# Patient Record
Sex: FEMALE | Race: WHITE | URBAN - METROPOLITAN AREA
[De-identification: names, ages, dates, MRNs, and addresses within clinical notes are randomized per-mention and may not be internally consistent; named-entity substitution may affect disease eponyms.]

---

## 2017-01-14 ENCOUNTER — OUTPATIENT (OUTPATIENT)
Dept: OUTPATIENT SERVICES | Facility: HOSPITAL | Age: 52
LOS: 1 days | Discharge: HOME | End: 2017-01-14

## 2017-06-27 DIAGNOSIS — R91.1 SOLITARY PULMONARY NODULE: ICD-10-CM

## 2017-09-25 ENCOUNTER — OUTPATIENT (OUTPATIENT)
Dept: OUTPATIENT SERVICES | Facility: HOSPITAL | Age: 52
LOS: 1 days | Discharge: HOME | End: 2017-09-25

## 2017-09-25 DIAGNOSIS — M17.0 BILATERAL PRIMARY OSTEOARTHRITIS OF KNEE: ICD-10-CM

## 2018-01-02 ENCOUNTER — OUTPATIENT (OUTPATIENT)
Dept: OUTPATIENT SERVICES | Facility: HOSPITAL | Age: 53
LOS: 1 days | Discharge: HOME | End: 2018-01-02

## 2018-01-02 DIAGNOSIS — Z12.31 ENCOUNTER FOR SCREENING MAMMOGRAM FOR MALIGNANT NEOPLASM OF BREAST: ICD-10-CM

## 2019-01-04 ENCOUNTER — OUTPATIENT (OUTPATIENT)
Dept: OUTPATIENT SERVICES | Facility: HOSPITAL | Age: 54
LOS: 1 days | Discharge: HOME | End: 2019-01-04

## 2019-01-04 DIAGNOSIS — Z12.31 ENCOUNTER FOR SCREENING MAMMOGRAM FOR MALIGNANT NEOPLASM OF BREAST: ICD-10-CM

## 2019-01-11 ENCOUNTER — OUTPATIENT (OUTPATIENT)
Dept: OUTPATIENT SERVICES | Facility: HOSPITAL | Age: 54
LOS: 1 days | Discharge: HOME | End: 2019-01-11

## 2019-01-11 DIAGNOSIS — R92.8 OTHER ABNORMAL AND INCONCLUSIVE FINDINGS ON DIAGNOSTIC IMAGING OF BREAST: ICD-10-CM

## 2019-02-26 ENCOUNTER — OUTPATIENT (OUTPATIENT)
Dept: OUTPATIENT SERVICES | Facility: HOSPITAL | Age: 54
LOS: 1 days | Discharge: HOME | End: 2019-02-26

## 2019-02-26 DIAGNOSIS — E04.1 NONTOXIC SINGLE THYROID NODULE: ICD-10-CM

## 2019-09-17 ENCOUNTER — OUTPATIENT (OUTPATIENT)
Dept: OUTPATIENT SERVICES | Facility: HOSPITAL | Age: 54
LOS: 1 days | Discharge: HOME | End: 2019-09-17

## 2019-09-17 DIAGNOSIS — R06.00 DYSPNEA, UNSPECIFIED: ICD-10-CM

## 2019-09-25 ENCOUNTER — OUTPATIENT (OUTPATIENT)
Dept: OUTPATIENT SERVICES | Facility: HOSPITAL | Age: 54
LOS: 1 days | Discharge: HOME | End: 2019-09-25
Payer: COMMERCIAL

## 2019-09-25 DIAGNOSIS — R06.89 OTHER ABNORMALITIES OF BREATHING: ICD-10-CM

## 2019-09-25 PROCEDURE — 71250 CT THORAX DX C-: CPT | Mod: 26

## 2020-01-13 ENCOUNTER — OUTPATIENT (OUTPATIENT)
Dept: OUTPATIENT SERVICES | Facility: HOSPITAL | Age: 55
LOS: 1 days | Discharge: HOME | End: 2020-01-13
Payer: COMMERCIAL

## 2020-01-13 DIAGNOSIS — Z12.31 ENCOUNTER FOR SCREENING MAMMOGRAM FOR MALIGNANT NEOPLASM OF BREAST: ICD-10-CM

## 2020-01-13 PROBLEM — Z00.00 ENCOUNTER FOR PREVENTIVE HEALTH EXAMINATION: Status: ACTIVE | Noted: 2020-01-13

## 2020-01-13 PROCEDURE — 77063 BREAST TOMOSYNTHESIS BI: CPT | Mod: 26

## 2020-01-13 PROCEDURE — 77067 SCR MAMMO BI INCL CAD: CPT | Mod: 26

## 2020-01-29 ENCOUNTER — OUTPATIENT (OUTPATIENT)
Dept: OUTPATIENT SERVICES | Facility: HOSPITAL | Age: 55
LOS: 1 days | Discharge: HOME | End: 2020-01-29
Payer: COMMERCIAL

## 2020-01-29 DIAGNOSIS — R92.8 OTHER ABNORMAL AND INCONCLUSIVE FINDINGS ON DIAGNOSTIC IMAGING OF BREAST: ICD-10-CM

## 2020-01-29 PROCEDURE — G0279: CPT | Mod: 26,RT

## 2020-01-29 PROCEDURE — 77065 DX MAMMO INCL CAD UNI: CPT | Mod: 26,RT

## 2020-01-29 PROCEDURE — 76642 ULTRASOUND BREAST LIMITED: CPT | Mod: 26,RT

## 2020-01-29 PROCEDURE — 77061 BREAST TOMOSYNTHESIS UNI: CPT | Mod: 26,RT

## 2020-01-30 ENCOUNTER — CHART COPY (OUTPATIENT)
Age: 55
End: 2020-01-30

## 2020-03-04 ENCOUNTER — APPOINTMENT (OUTPATIENT)
Dept: OBGYN | Facility: CLINIC | Age: 55
End: 2020-03-04
Payer: COMMERCIAL

## 2020-03-04 VITALS
BODY MASS INDEX: 36.19 KG/M2 | DIASTOLIC BLOOD PRESSURE: 81 MMHG | HEART RATE: 81 BPM | SYSTOLIC BLOOD PRESSURE: 118 MMHG | WEIGHT: 212 LBS | HEIGHT: 64 IN

## 2020-03-04 DIAGNOSIS — Z78.9 OTHER SPECIFIED HEALTH STATUS: ICD-10-CM

## 2020-03-04 DIAGNOSIS — Z80.42 FAMILY HISTORY OF MALIGNANT NEOPLASM OF PROSTATE: ICD-10-CM

## 2020-03-04 DIAGNOSIS — Z83.3 FAMILY HISTORY OF DIABETES MELLITUS: ICD-10-CM

## 2020-03-04 DIAGNOSIS — Z82.49 FAMILY HISTORY OF ISCHEMIC HEART DISEASE AND OTHER DISEASES OF THE CIRCULATORY SYSTEM: ICD-10-CM

## 2020-03-04 DIAGNOSIS — Z85.42 PERSONAL HISTORY OF MALIGNANT NEOPLASM OF OTHER PARTS OF UTERUS: ICD-10-CM

## 2020-03-04 PROCEDURE — 99214 OFFICE O/P EST MOD 30 MIN: CPT

## 2020-03-04 NOTE — CHIEF COMPLAINT
[Follow Up] : follow up GYN visit [FreeTextEntry1] : Patient is here for gynecologic exam , up to date with PE , blood work , S/P DO-BSO   endometrial Ca , mammography R  breast diagnostic in July 2020.

## 2020-03-04 NOTE — HISTORY OF PRESENT ILLNESS
[Menarche Age: ____] : age at menarche was [unfilled] [Menopause Age: ____] : age at menopause was [unfilled] [Sexually Active] : is sexually active [Female ___] : [unfilled] female [NA] : N/A [Contraception] : does not use contraception

## 2020-03-04 NOTE — DISCUSSION/SUMMARY
[FreeTextEntry1] : Patient with history of endometrial cancer 1999.\par Here for follow up. Also with yeast vaginitis.\par \par Sunshine Garcia M.D.\par \par \par

## 2020-03-26 ENCOUNTER — TRANSCRIPTION ENCOUNTER (OUTPATIENT)
Age: 55
End: 2020-03-26

## 2020-04-29 ENCOUNTER — TRANSCRIPTION ENCOUNTER (OUTPATIENT)
Age: 55
End: 2020-04-29

## 2020-05-26 ENCOUNTER — EMERGENCY (EMERGENCY)
Facility: HOSPITAL | Age: 55
LOS: 0 days | Discharge: HOME | End: 2020-05-26
Attending: EMERGENCY MEDICINE | Admitting: EMERGENCY MEDICINE
Payer: COMMERCIAL

## 2020-05-26 VITALS
TEMPERATURE: 97 F | SYSTOLIC BLOOD PRESSURE: 138 MMHG | DIASTOLIC BLOOD PRESSURE: 86 MMHG | OXYGEN SATURATION: 100 % | RESPIRATION RATE: 18 BRPM | HEART RATE: 78 BPM | WEIGHT: 212.08 LBS | HEIGHT: 64 IN

## 2020-05-26 DIAGNOSIS — Z91.041 RADIOGRAPHIC DYE ALLERGY STATUS: ICD-10-CM

## 2020-05-26 DIAGNOSIS — E11.9 TYPE 2 DIABETES MELLITUS WITHOUT COMPLICATIONS: ICD-10-CM

## 2020-05-26 DIAGNOSIS — R10.9 UNSPECIFIED ABDOMINAL PAIN: ICD-10-CM

## 2020-05-26 DIAGNOSIS — N20.0 CALCULUS OF KIDNEY: ICD-10-CM

## 2020-05-26 DIAGNOSIS — Z88.0 ALLERGY STATUS TO PENICILLIN: ICD-10-CM

## 2020-05-26 DIAGNOSIS — I10 ESSENTIAL (PRIMARY) HYPERTENSION: ICD-10-CM

## 2020-05-26 DIAGNOSIS — Z88.2 ALLERGY STATUS TO SULFONAMIDES: ICD-10-CM

## 2020-05-26 DIAGNOSIS — Z90.49 ACQUIRED ABSENCE OF OTHER SPECIFIED PARTS OF DIGESTIVE TRACT: ICD-10-CM

## 2020-05-26 DIAGNOSIS — Z91.013 ALLERGY TO SEAFOOD: ICD-10-CM

## 2020-05-26 LAB
ALBUMIN SERPL ELPH-MCNC: 4.5 G/DL — SIGNIFICANT CHANGE UP (ref 3.5–5.2)
ALP SERPL-CCNC: 73 U/L — SIGNIFICANT CHANGE UP (ref 30–115)
ALT FLD-CCNC: 27 U/L — SIGNIFICANT CHANGE UP (ref 0–41)
ANION GAP SERPL CALC-SCNC: 12 MMOL/L — SIGNIFICANT CHANGE UP (ref 7–14)
APPEARANCE UR: CLEAR — SIGNIFICANT CHANGE UP
AST SERPL-CCNC: 29 U/L — SIGNIFICANT CHANGE UP (ref 0–41)
BACTERIA # UR AUTO: NEGATIVE — SIGNIFICANT CHANGE UP
BASOPHILS # BLD AUTO: 0.07 K/UL — SIGNIFICANT CHANGE UP (ref 0–0.2)
BASOPHILS NFR BLD AUTO: 0.7 % — SIGNIFICANT CHANGE UP (ref 0–1)
BILIRUB SERPL-MCNC: 0.2 MG/DL — SIGNIFICANT CHANGE UP (ref 0.2–1.2)
BILIRUB UR-MCNC: NEGATIVE — SIGNIFICANT CHANGE UP
BUN SERPL-MCNC: 16 MG/DL — SIGNIFICANT CHANGE UP (ref 10–20)
CALCIUM SERPL-MCNC: 9.6 MG/DL — SIGNIFICANT CHANGE UP (ref 8.5–10.1)
CHLORIDE SERPL-SCNC: 102 MMOL/L — SIGNIFICANT CHANGE UP (ref 98–110)
CK SERPL-CCNC: 61 U/L — SIGNIFICANT CHANGE UP (ref 0–225)
CO2 SERPL-SCNC: 29 MMOL/L — SIGNIFICANT CHANGE UP (ref 17–32)
COLOR SPEC: SIGNIFICANT CHANGE UP
CREAT SERPL-MCNC: 0.7 MG/DL — SIGNIFICANT CHANGE UP (ref 0.7–1.5)
DIFF PNL FLD: ABNORMAL
EOSINOPHIL # BLD AUTO: 0.2 K/UL — SIGNIFICANT CHANGE UP (ref 0–0.7)
EOSINOPHIL NFR BLD AUTO: 2 % — SIGNIFICANT CHANGE UP (ref 0–8)
EPI CELLS # UR: 1 /HPF — SIGNIFICANT CHANGE UP (ref 0–5)
GLUCOSE SERPL-MCNC: 162 MG/DL — HIGH (ref 70–99)
GLUCOSE UR QL: NEGATIVE — SIGNIFICANT CHANGE UP
HCT VFR BLD CALC: 42.5 % — SIGNIFICANT CHANGE UP (ref 37–47)
HGB BLD-MCNC: 14 G/DL — SIGNIFICANT CHANGE UP (ref 12–16)
HYALINE CASTS # UR AUTO: 2 /LPF — SIGNIFICANT CHANGE UP (ref 0–7)
IMM GRANULOCYTES NFR BLD AUTO: 0.3 % — SIGNIFICANT CHANGE UP (ref 0.1–0.3)
KETONES UR-MCNC: NEGATIVE — SIGNIFICANT CHANGE UP
LEUKOCYTE ESTERASE UR-ACNC: ABNORMAL
LYMPHOCYTES # BLD AUTO: 2.9 K/UL — SIGNIFICANT CHANGE UP (ref 1.2–3.4)
LYMPHOCYTES # BLD AUTO: 29.7 % — SIGNIFICANT CHANGE UP (ref 20.5–51.1)
MCHC RBC-ENTMCNC: 27.7 PG — SIGNIFICANT CHANGE UP (ref 27–31)
MCHC RBC-ENTMCNC: 32.9 G/DL — SIGNIFICANT CHANGE UP (ref 32–37)
MCV RBC AUTO: 84.2 FL — SIGNIFICANT CHANGE UP (ref 81–99)
MONOCYTES # BLD AUTO: 0.83 K/UL — HIGH (ref 0.1–0.6)
MONOCYTES NFR BLD AUTO: 8.5 % — SIGNIFICANT CHANGE UP (ref 1.7–9.3)
NEUTROPHILS # BLD AUTO: 5.73 K/UL — SIGNIFICANT CHANGE UP (ref 1.4–6.5)
NEUTROPHILS NFR BLD AUTO: 58.8 % — SIGNIFICANT CHANGE UP (ref 42.2–75.2)
NITRITE UR-MCNC: NEGATIVE — SIGNIFICANT CHANGE UP
NRBC # BLD: 0 /100 WBCS — SIGNIFICANT CHANGE UP (ref 0–0)
PH UR: 6 — SIGNIFICANT CHANGE UP (ref 5–8)
PLATELET # BLD AUTO: 245 K/UL — SIGNIFICANT CHANGE UP (ref 130–400)
POTASSIUM SERPL-MCNC: 4.2 MMOL/L — SIGNIFICANT CHANGE UP (ref 3.5–5)
POTASSIUM SERPL-SCNC: 4.2 MMOL/L — SIGNIFICANT CHANGE UP (ref 3.5–5)
PROT SERPL-MCNC: 6.9 G/DL — SIGNIFICANT CHANGE UP (ref 6–8)
PROT UR-MCNC: SIGNIFICANT CHANGE UP
RBC # BLD: 5.05 M/UL — SIGNIFICANT CHANGE UP (ref 4.2–5.4)
RBC # FLD: 13.4 % — SIGNIFICANT CHANGE UP (ref 11.5–14.5)
RBC CASTS # UR COMP ASSIST: 13 /HPF — HIGH (ref 0–4)
SODIUM SERPL-SCNC: 143 MMOL/L — SIGNIFICANT CHANGE UP (ref 135–146)
SP GR SPEC: 1.01 — SIGNIFICANT CHANGE UP (ref 1.01–1.02)
UROBILINOGEN FLD QL: SIGNIFICANT CHANGE UP
WBC # BLD: 9.76 K/UL — SIGNIFICANT CHANGE UP (ref 4.8–10.8)
WBC # FLD AUTO: 9.76 K/UL — SIGNIFICANT CHANGE UP (ref 4.8–10.8)
WBC UR QL: 4 /HPF — SIGNIFICANT CHANGE UP (ref 0–5)

## 2020-05-26 PROCEDURE — 74176 CT ABD & PELVIS W/O CONTRAST: CPT | Mod: 26

## 2020-05-26 PROCEDURE — 99285 EMERGENCY DEPT VISIT HI MDM: CPT

## 2020-05-26 RX ORDER — SODIUM CHLORIDE 9 MG/ML
1000 INJECTION INTRAMUSCULAR; INTRAVENOUS; SUBCUTANEOUS ONCE
Refills: 0 | Status: DISCONTINUED | OUTPATIENT
Start: 2020-05-26 | End: 2020-05-26

## 2020-05-26 RX ORDER — SODIUM CHLORIDE 9 MG/ML
2000 INJECTION, SOLUTION INTRAVENOUS ONCE
Refills: 0 | Status: COMPLETED | OUTPATIENT
Start: 2020-05-26 | End: 2020-05-26

## 2020-05-26 RX ORDER — IOHEXOL 300 MG/ML
30 INJECTION, SOLUTION INTRAVENOUS ONCE
Refills: 0 | Status: DISCONTINUED | OUTPATIENT
Start: 2020-05-26 | End: 2020-05-26

## 2020-05-26 RX ADMIN — SODIUM CHLORIDE 2000 MILLILITER(S): 9 INJECTION, SOLUTION INTRAVENOUS at 03:07

## 2020-05-26 NOTE — ED PROVIDER NOTE - ATTENDING CONTRIBUTION TO CARE
55 yo female with PMH of DM, HTN, gastric sleeve with previous leak (surgery doc is Ceci), TAHBSO, cholecystectomy, appendectomy presents to the ER for 1 week of left lower back, with occasional radiation to the abdomen. Pt has had N/V x 2 today, and soft to watery stools (4 episodes a day) for past 4 days. Pt denies any fever/dysuria/hematuria/cough/URI/rash/chills/blood in the stools/CP/SOB/trauma. Does admit to walking for a [employer] walkathon (30miles spread over 3 days) but states she normally walks 6-7 miles daily. States her urine afterwards was dark like cola. Denies falls or doing any heavy lifting recently. Pt works as a  and had been tested for COVID, was negative. On exam no flank tenderness to palpation, mild tenderness diffusely (right side on my exam, left side on resident exam), with hernia to the right abdomen palpable, non distended, +BS. Will check labs, urine, CT scan (non contrast as pt states she is allergic to oral and IV contrast). Pt with multiple drug allergies, which she only can list when directly asked about specific meds. Reassess. 53 yo female with PMH of DM, HTN, gastric sleeve with previous leak (surgery doc is Ceci), TAHBSO, cholecystectomy, appendectomy presents to the ER for 1 week of left lower back, with occasional radiation to the abdomen. Pt has had N/V x 2 today, and soft to watery stools (4 episodes a day) for past 4 days. Pt denies any fever/dysuria/hematuria/cough/URI/rash/chills/blood in the stools/CP/SOB/trauma. Does admit to walking for a [employer] walkathon (30miles spread over 3 days) but states she normally walks 6-7 miles daily. States her urine afterwards was dark like cola. Denies falls or doing any heavy lifting recently. Pt works as a  and had been tested for COVID, was negative. On exam no flank tenderness to palpation, mild tenderness diffusely (right side on my exam, left side on resident exam), with hernia to the right abdomen palpable, non distended, +BS. Will check labs, urine, CT scan (non contrast as pt states she is allergic to oral and IV contrast). Pt with multiple drug allergies, which she only can list when directly asked about specific meds. Reassess.    ALL: iodine/shellfish (anaphylaxis), benadryl, pcn, sulfa, azithromycin, vicodin, nsaids  PMH as above  Meds: metformin, bystolic, losartan, levemir, novolog  SH: no smoking, +etoh occ  PMD hui Etienne (last upper/lower endoscopies 3 yrs ago--occ polyp, no ulcer)

## 2020-05-26 NOTE — ED PROVIDER NOTE - NSFOLLOWUPINSTRUCTIONS_ED_ALL_ED_FT
Your body tends to develop kidney stones which can obstruct the flow of urine and cause an increase of pressure within the genitourinary system. This can cause severe pain in your back, flank and abdomen. If it very important to stay well hydrated do reduce the incidence of stone obstruction and pain. If you develop severe pain which is not well controlled with over the counter pain medication, or a fever along with the pain please call your Urologist or visit the hospital. Please follow up with your Urologist for continued monitoring and treatment.

## 2020-05-26 NOTE — ED ADULT TRIAGE NOTE - CHIEF COMPLAINT QUOTE
" I have pain in my left side back and goes in my abdomen for a week and I started vomiting tonight."

## 2020-05-26 NOTE — ED PROVIDER NOTE - PHYSICAL EXAMINATION
CONSTITUTIONAL: Well-developed; well-nourished; in no acute distress.   SKIN: warm, dry  HEAD: Normocephalic.  EYES: PERRL, EOMI.  ENT: Airway clear.  NECK: Supple.  LYMPH: No acute cervical adenopathy.  CARD: No murmurs, rubs or gallops. Regular rate and rhythm.   RESP: No wheezing, rales or rhonchi.  ABD: soft, TTP LLQ, no peritoneal signs. No CVA tenderness. L flank TTP  EXT: No clubbing, cyanosis.   NEURO: Alert, oriented.  PSYCH: Cooperative, appropriate.

## 2020-05-26 NOTE — ED PROVIDER NOTE - PATIENT PORTAL LINK FT
You can access the FollowMyHealth Patient Portal offered by [facility] by registering at the following website: [email or web address]. By joining [facility]’s FollowMyHealth portal, you will also be able to view your health information using other applications (apps) compatible with our system.

## 2020-05-26 NOTE — ED PROVIDER NOTE - NS ED ROS FT
Constitutional: (-) fever  Eyes/ENT: (-) runny nose  Cardiovascular: (-) chest pain, (-) syncope  Respiratory: (-) cough, (-) shortness of breath  Gastrointestinal: (-) vomiting, (-) diarrhea, (+) abdominal pain  : (-) dysuria   Musculoskeletal: (+) back pain, (-) joint pain  Integumentary: (-) rash  Neurological: (-)loc  Allergic/Immunologic: (-) pruritus  Endocrine: (-) history of thyroid disease

## 2020-05-26 NOTE — ED PROVIDER NOTE - NSFOLLOWUPCLINICS_GEN_ALL_ED_FT
[facility]  Medicine  [address], [place]   Phone: [phone]  Fax:   Follow Up Time: 1-3 Days

## 2020-05-26 NOTE — ED PROVIDER NOTE - PROGRESS NOTE DETAILS
SC: UPJ stone seen on CT. Patient to be discharged from ED. Any available test results were discussed with patient and/or family. Verbal instructions given, including instructions to return to ED immediately for any new, worsening, or concerning symptoms. Patient endorsed understanding. Written discharge instructions additionally given, including follow-up plan.

## 2020-05-26 NOTE — ED PROVIDER NOTE - OBJECTIVE STATEMENT
54F with pmh of DM, HTN, gastric sleeve presents with L flank pain radiating to the LLQ beginning 1 week ago, sharp, moderate intensity, no provoking or palliating factors. PT states that she went on a 30 mile walk before sx bgan. She also notes NB diarrhea aprox 4x daily for the past 4 days. PT denies fever, chills, n/v/d, dysuria, hematuria. 54F with pmh of DM, HTN, gastric sleeve 5 years ago presents with L flank pain radiating to the LLQ beginning 1 week ago, sharp, moderate intensity, no provoking or palliating factors. PT states that she went on a 30 mile walk before sx bgan. She also notes NB diarrhea aprox 4x daily for the past 4 days. PT denies fever, chills, n/v/d, dysuria, hematuria.  Surg: Jeny

## 2020-05-26 NOTE — ED PROVIDER NOTE - CLINICAL SUMMARY MEDICAL DECISION MAKING FREE TEXT BOX
53 yo female with pmh of dm, htn, gastric sleeve presents to ER for left flank and left sided abdomen discomfort. Multiple allergies. Pt had labs, urine, CT non contrast done. Found to have left distal stone 5x4 mm, mild hydro. UA shows blood and small leuko but neg bacteria/nitr, and denies frequency or dysuria. Recommend tylenol for pain, fluids and follow up with urology.

## 2020-05-26 NOTE — ED ADULT NURSE REASSESSMENT NOTE - NS ED NURSE REASSESS COMMENT FT1
Patient resting in bed upon discharge, no complaints of pain. As per MD echavarria, ok to DC without receiving NS bolus. iv removed and papers given. ambulated out of ed steadily

## 2020-05-27 ENCOUNTER — TRANSCRIPTION ENCOUNTER (OUTPATIENT)
Age: 55
End: 2020-05-27

## 2020-05-27 LAB
CULTURE RESULTS: SIGNIFICANT CHANGE UP
SPECIMEN SOURCE: SIGNIFICANT CHANGE UP

## 2020-07-21 ENCOUNTER — TRANSCRIPTION ENCOUNTER (OUTPATIENT)
Age: 55
End: 2020-07-21

## 2020-07-31 ENCOUNTER — RESULT REVIEW (OUTPATIENT)
Age: 55
End: 2020-07-31

## 2020-07-31 ENCOUNTER — OUTPATIENT (OUTPATIENT)
Dept: OUTPATIENT SERVICES | Facility: HOSPITAL | Age: 55
LOS: 1 days | Discharge: HOME | End: 2020-07-31
Payer: COMMERCIAL

## 2020-07-31 DIAGNOSIS — R92.8 OTHER ABNORMAL AND INCONCLUSIVE FINDINGS ON DIAGNOSTIC IMAGING OF BREAST: ICD-10-CM

## 2020-07-31 PROCEDURE — 77065 DX MAMMO INCL CAD UNI: CPT | Mod: 26,RT

## 2020-07-31 PROCEDURE — G0279: CPT | Mod: 26

## 2020-08-11 ENCOUNTER — OUTPATIENT (OUTPATIENT)
Dept: OUTPATIENT SERVICES | Facility: HOSPITAL | Age: 55
LOS: 1 days | Discharge: HOME | End: 2020-08-11
Payer: COMMERCIAL

## 2020-08-11 ENCOUNTER — RESULT REVIEW (OUTPATIENT)
Age: 55
End: 2020-08-11

## 2020-08-11 DIAGNOSIS — N20.0 CALCULUS OF KIDNEY: ICD-10-CM

## 2020-08-11 PROCEDURE — 76770 US EXAM ABDO BACK WALL COMP: CPT | Mod: 26

## 2020-08-14 ENCOUNTER — APPOINTMENT (OUTPATIENT)
Dept: UROLOGY | Facility: CLINIC | Age: 55
End: 2020-08-14
Payer: COMMERCIAL

## 2020-08-14 VITALS
HEART RATE: 74 BPM | SYSTOLIC BLOOD PRESSURE: 130 MMHG | DIASTOLIC BLOOD PRESSURE: 82 MMHG | WEIGHT: 206 LBS | HEIGHT: 64 IN | BODY MASS INDEX: 35.17 KG/M2 | TEMPERATURE: 98.4 F

## 2020-08-14 DIAGNOSIS — N20.0 CALCULUS OF KIDNEY: ICD-10-CM

## 2020-08-14 PROCEDURE — 99204 OFFICE O/P NEW MOD 45 MIN: CPT

## 2020-08-17 LAB
APPEARANCE: CLEAR
BACTERIA UR CULT: NORMAL
BILIRUBIN URINE: NEGATIVE
BLOOD URINE: NEGATIVE
COLOR: YELLOW
GLUCOSE QUALITATIVE U: ABNORMAL
KETONES URINE: NEGATIVE
LEUKOCYTE ESTERASE URINE: NEGATIVE
NITRITE URINE: NEGATIVE
PH URINE: 5.5
PROTEIN URINE: NORMAL
SPECIFIC GRAVITY URINE: 1.02
UROBILINOGEN URINE: NORMAL

## 2020-08-18 ENCOUNTER — OUTPATIENT (OUTPATIENT)
Dept: OUTPATIENT SERVICES | Facility: HOSPITAL | Age: 55
LOS: 1 days | Discharge: HOME | End: 2020-08-18
Payer: COMMERCIAL

## 2020-08-18 DIAGNOSIS — N20.1 CALCULUS OF URETER: ICD-10-CM

## 2020-08-18 PROCEDURE — 74019 RADEX ABDOMEN 2 VIEWS: CPT | Mod: 26

## 2020-08-20 ENCOUNTER — APPOINTMENT (OUTPATIENT)
Dept: UROLOGY | Facility: CLINIC | Age: 55
End: 2020-08-20
Payer: COMMERCIAL

## 2020-08-20 ENCOUNTER — OUTPATIENT (OUTPATIENT)
Dept: OUTPATIENT SERVICES | Facility: HOSPITAL | Age: 55
LOS: 1 days | Discharge: HOME | End: 2020-08-20
Payer: COMMERCIAL

## 2020-08-20 VITALS
WEIGHT: 206 LBS | SYSTOLIC BLOOD PRESSURE: 153 MMHG | TEMPERATURE: 98 F | BODY MASS INDEX: 35.17 KG/M2 | HEART RATE: 77 BPM | DIASTOLIC BLOOD PRESSURE: 83 MMHG | HEIGHT: 64 IN

## 2020-08-20 VITALS
RESPIRATION RATE: 18 BRPM | OXYGEN SATURATION: 97 % | SYSTOLIC BLOOD PRESSURE: 144 MMHG | TEMPERATURE: 98 F | WEIGHT: 205.03 LBS | HEART RATE: 66 BPM | DIASTOLIC BLOOD PRESSURE: 75 MMHG | HEIGHT: 64 IN

## 2020-08-20 DIAGNOSIS — N20.1 CALCULUS OF URETER: ICD-10-CM

## 2020-08-20 DIAGNOSIS — Z90.49 ACQUIRED ABSENCE OF OTHER SPECIFIED PARTS OF DIGESTIVE TRACT: Chronic | ICD-10-CM

## 2020-08-20 DIAGNOSIS — N13.30 UNSPECIFIED HYDRONEPHROSIS: ICD-10-CM

## 2020-08-20 DIAGNOSIS — Z01.818 ENCOUNTER FOR OTHER PREPROCEDURAL EXAMINATION: ICD-10-CM

## 2020-08-20 DIAGNOSIS — Z98.890 OTHER SPECIFIED POSTPROCEDURAL STATES: Chronic | ICD-10-CM

## 2020-08-20 DIAGNOSIS — Z90.710 ACQUIRED ABSENCE OF BOTH CERVIX AND UTERUS: Chronic | ICD-10-CM

## 2020-08-20 LAB
A1C WITH ESTIMATED AVERAGE GLUCOSE RESULT: 9 % — HIGH (ref 4–5.6)
ALBUMIN SERPL ELPH-MCNC: 4.6 G/DL — SIGNIFICANT CHANGE UP (ref 3.5–5.2)
ALP SERPL-CCNC: 73 U/L — SIGNIFICANT CHANGE UP (ref 30–115)
ALT FLD-CCNC: 19 U/L — SIGNIFICANT CHANGE UP (ref 0–41)
ANION GAP SERPL CALC-SCNC: 13 MMOL/L — SIGNIFICANT CHANGE UP (ref 7–14)
APPEARANCE UR: CLEAR — SIGNIFICANT CHANGE UP
APTT BLD: 27.4 SEC — SIGNIFICANT CHANGE UP (ref 27–39.2)
AST SERPL-CCNC: 23 U/L — SIGNIFICANT CHANGE UP (ref 0–41)
BILIRUB SERPL-MCNC: 0.3 MG/DL — SIGNIFICANT CHANGE UP (ref 0.2–1.2)
BILIRUB UR-MCNC: NEGATIVE — SIGNIFICANT CHANGE UP
BUN SERPL-MCNC: 12 MG/DL — SIGNIFICANT CHANGE UP (ref 10–20)
CALCIUM SERPL-MCNC: 9.4 MG/DL — SIGNIFICANT CHANGE UP (ref 8.5–10.1)
CHLORIDE SERPL-SCNC: 101 MMOL/L — SIGNIFICANT CHANGE UP (ref 98–110)
CO2 SERPL-SCNC: 26 MMOL/L — SIGNIFICANT CHANGE UP (ref 17–32)
COLOR SPEC: YELLOW — SIGNIFICANT CHANGE UP
CREAT SERPL-MCNC: 0.7 MG/DL — SIGNIFICANT CHANGE UP (ref 0.7–1.5)
DIFF PNL FLD: ABNORMAL
ESTIMATED AVERAGE GLUCOSE: 212 MG/DL — HIGH (ref 68–114)
GLUCOSE SERPL-MCNC: 240 MG/DL — HIGH (ref 70–99)
GLUCOSE UR QL: ABNORMAL
HCT VFR BLD CALC: 41.3 % — SIGNIFICANT CHANGE UP (ref 37–47)
HGB BLD-MCNC: 13.7 G/DL — SIGNIFICANT CHANGE UP (ref 12–16)
INR BLD: 0.97 RATIO — SIGNIFICANT CHANGE UP (ref 0.65–1.3)
KETONES UR-MCNC: SIGNIFICANT CHANGE UP
LEUKOCYTE ESTERASE UR-ACNC: NEGATIVE — SIGNIFICANT CHANGE UP
MCHC RBC-ENTMCNC: 28.1 PG — SIGNIFICANT CHANGE UP (ref 27–31)
MCHC RBC-ENTMCNC: 33.2 G/DL — SIGNIFICANT CHANGE UP (ref 32–37)
MCV RBC AUTO: 84.6 FL — SIGNIFICANT CHANGE UP (ref 81–99)
NITRITE UR-MCNC: NEGATIVE — SIGNIFICANT CHANGE UP
NRBC # BLD: 0 /100 WBCS — SIGNIFICANT CHANGE UP (ref 0–0)
PH UR: 5 — SIGNIFICANT CHANGE UP (ref 5–8)
PLATELET # BLD AUTO: 189 K/UL — SIGNIFICANT CHANGE UP (ref 130–400)
POTASSIUM SERPL-MCNC: 4.3 MMOL/L — SIGNIFICANT CHANGE UP (ref 3.5–5)
POTASSIUM SERPL-SCNC: 4.3 MMOL/L — SIGNIFICANT CHANGE UP (ref 3.5–5)
PROT SERPL-MCNC: 6.7 G/DL — SIGNIFICANT CHANGE UP (ref 6–8)
PROT UR-MCNC: NEGATIVE — SIGNIFICANT CHANGE UP
PROTHROM AB SERPL-ACNC: 11.2 SEC — SIGNIFICANT CHANGE UP (ref 9.95–12.87)
RBC # BLD: 4.88 M/UL — SIGNIFICANT CHANGE UP (ref 4.2–5.4)
RBC # FLD: 13.2 % — SIGNIFICANT CHANGE UP (ref 11.5–14.5)
SODIUM SERPL-SCNC: 140 MMOL/L — SIGNIFICANT CHANGE UP (ref 135–146)
SP GR SPEC: 1.02 — SIGNIFICANT CHANGE UP (ref 1.01–1.02)
UROBILINOGEN FLD QL: SIGNIFICANT CHANGE UP
WBC # BLD: 7.39 K/UL — SIGNIFICANT CHANGE UP (ref 4.8–10.8)
WBC # FLD AUTO: 7.39 K/UL — SIGNIFICANT CHANGE UP (ref 4.8–10.8)

## 2020-08-20 PROCEDURE — 99214 OFFICE O/P EST MOD 30 MIN: CPT

## 2020-08-20 PROCEDURE — 93010 ELECTROCARDIOGRAM REPORT: CPT

## 2020-08-20 NOTE — LETTER BODY
[Please see my note below.] : Please see my note below. [Courtesy Letter:] : I had the pleasure of seeing your patient, [unfilled], in my office today. [FreeTextEntry1] : Please note she had a primary care physician who retired she is not yet picked another one she was told she must get one she is 54 years old, morbidly obese with diabetes and hypertension and anesthesia will not put her to sleep for a non-life-threatening urgent procedure without clearance [Sincerely,] : Sincerely,

## 2020-08-20 NOTE — H&P PST ADULT - HISTORY OF PRESENT ILLNESS
Pt denies cp palp uri cough dysuria or sob. ET: 1 FOS- denies SOB . PT denies any open wounds, drainage or rashes. denies hx of covid- denies recent cough fever or infection. aware to self quarantine preop. all instructions reviewed. scheduled for 9/2 procedure cystoscopy left ureteroscopy laser lithotripsy stone basketing and or JJ STENT POSSIBLE  SHOCK WAVE.  OR  BOOKING MADE AWARE OF LATEX ALLERGY FOR DOP 9/2 [facility].  PT HAS BEEN EXPERIENCING PAIN LEFT LOWER BACK  SINCE 5/2020.   DENIES PAIN MEDS . hx DM. FOLLOWS DR NYE. ADVISED F/U FOR 9/1 PM DOSE INSULIN HALF OR HOLD. PT COMPLIANT   Anesthesia Alert  NO--Difficult Airway   NO--History of neck surgery or radiation  NO--Limited ROM of neck  NO--History of Malignant hyperthermia  NO--Personal or family history of Pseudocholinesterase deficiency  NO--Prior Anesthesia Complication  NO--Latex Allergy  NO--Loose teeth  NO--History of Rheumatoid Arthritis  NO--NIDIA  NO--Other_____ Pt denies cp palp uri cough dysuria or sob. ET: 1 FOS- denies SOB . PT denies any open wounds, drainage or rashes. denies hx of covid- denies recent cough fever or infection. aware to self quarantine preop. all instructions reviewed. scheduled for 9/2 procedure cystoscopy left ureteroscopy laser lithotripsy stone basketing and or JJ STENT POSSIBLE  SHOCK WAVE.  OR  BOOKING MADE AWARE OF LATEX ALLERGY FOR DOP 9/2 [facility].  PT HAS BEEN EXPERIENCING DULL CONSTANT  PAIN LEFT LOWER BACK SINCE 5/2020.   DENIES PAIN MEDS . HX DM. FOLLOWS DR NYE. ADVISED F/U FOR 9/1 PM DOSE INSULIN HALF OR HOLD. PT COMPLIANT .  Anesthesia Alert  NO--Difficult Airway   NO--History of neck surgery or radiation  NO--Limited ROM of neck  NO--History of Malignant hyperthermia  NO--Personal or family history of Pseudocholinesterase deficiency  NO--Prior Anesthesia Complication  YES-Latex Allergy - OR BOOKING AWARE    NO--Loose teeth  NO--History of Rheumatoid Arthritis  NO--NIDIA  NO--Other_____

## 2020-08-20 NOTE — HISTORY OF PRESENT ILLNESS
[None] : None [FreeTextEntry1] : Mary is a 54 year-old female, with a history of type 2 diabetes and hypertension, who was sent for consultation regarding left ureteral stone.\par \par On 5/26/20 patient presented to the emergency room complaining of severe left-sided flank pain, radiating across the abdomen, associated with nausea/vomiting.\par \par CT scan demonstrated mild left hydronephrosis secondary to an obstructing UPJ stone, measuring 0.5 x 0.4 x 0.3 cm with average Hounsfield units of 1000.\par \par During course of hospital stay patient's symptoms improved & she was discharged and instructed to followup with urology.\par \par She could not make an appointment until September and wound up having an episode of renal colic sometime in July. She went to see nephrology who ordered a renal ultrasound.\par \par Renal ultrasound Done August 11, 2020,demonstrated mild left hydronephrosis with Now a proximal left ureteral 0.6 x 0.3 x 0.7 cm calculus somewhat but minimally more distal   then when it was seen on prior CT scan. She was instructed to follow up immediately with urology.\par \par Today she is asymptomatic and denies any abdominal/flank pain or nausea/vomiting. She is urinating well without any change in her urination.\par \par She presents today for Urologic consultation and to plan definitive stone management

## 2020-08-20 NOTE — ASSESSMENT
[FreeTextEntry1] : She has an obstructing left ureteral stone,  resulting in mild left hydronephrosis, since May. Hounsfield units are about 550 - 700, as per my measurement.\par She is currently asymptomatic and denies any significant discomfort at this time.\par \par All options were reviewed including ESWL versus lithotripsy. At this time she'll obtain a KUB and medical clearance followed next week to make a decision regarding her procedure, Knowing that the success rate especially for an impacted stone is less with lithotripsy and if she has infection upstream it may be made worse. However if she has Pus upstream from the stone and we try ureteroscopy will probably be in two stages with the stent to decompress and/or stress the system. UA C&S will be sent from the office today.\par \par She will start alfuzosin For alpha blockade as opposed to tamsulosin because she has a sulfa allergy, and We also prescribed tramadol as needed for discomfort\par \par We discussed reasons presented to the ER including worsening pain, nausea/vomiting, fever/chills, hematuria, or further constitutional symptoms.\par \par

## 2020-08-20 NOTE — LETTER HEADER
[FreeTextEntry3] : Hayley Lr M.D.\par Director of Urology\par [facility]\par [address] [place] [address], [address]\[place] [address]

## 2020-08-20 NOTE — PHYSICAL EXAM
[General Appearance - Well Developed] : well developed [General Appearance - Well Nourished] : well nourished [Well Groomed] : well groomed [Normal Appearance] : normal appearance [General Appearance - In No Acute Distress] : no acute distress [Exaggerated Use Of Accessory Muscles For Inspiration] : no accessory muscle use [Respiration, Rhythm And Depth] : normal respiratory rhythm and effort [Abdomen Tenderness] : non-tender [Abdomen Soft] : soft [Costovertebral Angle Tenderness] : no ~M costovertebral angle tenderness [Urinary Bladder Findings] : the bladder was normal on palpation [Normal Station and Gait] : the gait and station were normal for the patient's age [] : no rash [No Focal Deficits] : no focal deficits [Oriented To Time, Place, And Person] : oriented to person, place, and time [Affect] : the affect was normal [Mood] : the mood was normal [Not Anxious] : not anxious [Heart Rate And Rhythm] : Heart rate and rhythm were normal [FreeTextEntry1] : Obese

## 2020-08-20 NOTE — HISTORY OF PRESENT ILLNESS
[FreeTextEntry1] : Mary is a 54 year-old female, with a history of type 2 diabetes and hypertension, who was sent for consultation regarding left ureteral stone.\par \par On 5/26/20 patient presented to the emergency room complaining of severe left-sided flank pain, radiating across the abdomen, associated with nausea/vomiting.\par \par CT scan demonstrated mild left hydronephrosis secondary to an obstructing UPJ stone, measuring 0.5 x 0.4 x 0.3 cm with average Hounsfield units of 1000.\par \par During course of hospital stay patient's symptoms improved & she was discharged and instructed to followup with urology.\par \par She could not make an appointment until September and wound up having an episode of renal colic sometime in July. She went to see nephrology who ordered a renal ultrasound.\par \par Renal ultrasound Done August 11, 2020,demonstrated mild left hydronephrosis with Now a proximal left ureteral 0.6 x 0.3 x 0.7 cm calculus somewhat but minimally more distal   then when it was seen on prior CT scan. She was instructed to follow up immediately with urology.\par \par She was seen on 08/14/2020 at which time she was asymptomatic and denied any abdominal/flank pain or nausea/vomiting. She was urinating well without any change in her urination.\par \par We reviewed the options sent her for a KUB, UA with C&S and she is here to discuss the results and options [1] : 1 [Left Flank] : left flank [None] : There is no radiation [Aching] : aching [___ Month(s) Ago] : [unfilled] month(s) ago [Constant] : constantly [de-identified] : episodic colic [de-identified] : time

## 2020-08-20 NOTE — H&P PST ADULT - ASSESSMENT
npo overnight  allergies reviewed  antibiotic clindamycin  no nsaids (allergy)  pt told re need to ambulate asap post op due to risk of DVT

## 2020-08-20 NOTE — H&P PST ADULT - NSICDXPASTMEDICALHX_GEN_ALL_CORE_FT
PAST MEDICAL HISTORY:  DM (diabetes mellitus)     HTN (hypertension)     Obese     Uterine cancer 1999 PAST MEDICAL HISTORY:  DM (diabetes mellitus)     H/O pneumothorax 2012 complications post  gastric sleeve    HTN (hypertension)     Obese     Pneumonia 2012 post gastric sleeve    Uterine cancer 1999 PAST MEDICAL HISTORY:  DM (diabetes mellitus)     H/O pneumothorax 2012 complications post  gastric sleeve    HTN (hypertension)     Hydronephrosis, left     Obese     Pneumonia 2012 post gastric sleeve    Ureteral stone     Uterine cancer 1999

## 2020-08-20 NOTE — LETTER HEADER
[FreeTextEntry3] : Hayley Lr M.D.\par Director of Urology\par [facility]\par [address] [place] [address], [address]\[place] [address]

## 2020-08-20 NOTE — H&P PST ADULT - NSICDXPASTSURGICALHX_GEN_ALL_CORE_FT
PAST SURGICAL HISTORY:  H/O hernia repair     History of cholecystectomy     S/P gastric surgery SLEEVE 2012    S/P gastric surgery 2000 BAND PROCEDURE & REMOVED    S/P hysterectomy PAST SURGICAL HISTORY:  H/O hernia repair     History of cholecystectomy     S/P gastric surgery SLEEVE 2012 -post op complications. ICU X MANY WKS PER PT    S/P gastric surgery 2000 BAND PROCEDURE & REMOVED    S/P hysterectomy 1999

## 2020-08-20 NOTE — ASSESSMENT
[FreeTextEntry1] : my recommendation is ureteroscopy possibly JJ . if she really doesn’t want we will go for eswl

## 2020-08-20 NOTE — LETTER BODY
[Consult Letter:] : I had the pleasure of evaluating your patient, [unfilled]. [Please see my note below.] : Please see my note below. [Sincerely,] : Sincerely, [Consult Closing:] : Thank you very much for allowing me to participate in the care of this patient.  If you have any questions, please do not hesitate to contact me. [FreeTextEntry2] : To Whom It May Concern [FreeTextEntry1] : Please note she had a primary care physician who retired she is not yet picked another one she was told she must get one she is 54 years old, morbidly obese with diabetes and hypertension and anesthesia will not put her to sleep for a non-life-threatening urgent procedure without clearance

## 2020-08-22 LAB
CULTURE RESULTS: SIGNIFICANT CHANGE UP
SPECIMEN SOURCE: SIGNIFICANT CHANGE UP

## 2020-08-26 ENCOUNTER — OUTPATIENT (OUTPATIENT)
Dept: OUTPATIENT SERVICES | Facility: HOSPITAL | Age: 55
LOS: 1 days | Discharge: HOME | End: 2020-08-26

## 2020-08-26 ENCOUNTER — RESULT REVIEW (OUTPATIENT)
Age: 55
End: 2020-08-26

## 2020-08-26 DIAGNOSIS — Z98.890 OTHER SPECIFIED POSTPROCEDURAL STATES: Chronic | ICD-10-CM

## 2020-08-26 DIAGNOSIS — Z90.49 ACQUIRED ABSENCE OF OTHER SPECIFIED PARTS OF DIGESTIVE TRACT: Chronic | ICD-10-CM

## 2020-08-26 DIAGNOSIS — Z90.710 ACQUIRED ABSENCE OF BOTH CERVIX AND UTERUS: Chronic | ICD-10-CM

## 2020-08-26 DIAGNOSIS — Z02.9 ENCOUNTER FOR ADMINISTRATIVE EXAMINATIONS, UNSPECIFIED: ICD-10-CM

## 2020-08-26 PROBLEM — I10 ESSENTIAL (PRIMARY) HYPERTENSION: Chronic | Status: ACTIVE | Noted: 2020-08-20

## 2020-08-26 PROBLEM — E66.9 OBESITY, UNSPECIFIED: Chronic | Status: ACTIVE | Noted: 2020-08-20

## 2020-08-26 PROBLEM — N20.1 CALCULUS OF URETER: Chronic | Status: ACTIVE | Noted: 2020-08-20

## 2020-08-26 PROBLEM — Z87.09 PERSONAL HISTORY OF OTHER DISEASES OF THE RESPIRATORY SYSTEM: Chronic | Status: ACTIVE | Noted: 2020-08-20

## 2020-08-26 PROBLEM — N13.30 UNSPECIFIED HYDRONEPHROSIS: Chronic | Status: ACTIVE | Noted: 2020-08-20

## 2020-08-26 PROBLEM — C55 MALIGNANT NEOPLASM OF UTERUS, PART UNSPECIFIED: Chronic | Status: ACTIVE | Noted: 2020-08-20

## 2020-08-26 PROBLEM — E11.9 TYPE 2 DIABETES MELLITUS WITHOUT COMPLICATIONS: Chronic | Status: ACTIVE | Noted: 2020-08-20

## 2020-08-26 PROBLEM — J18.9 PNEUMONIA, UNSPECIFIED ORGANISM: Chronic | Status: ACTIVE | Noted: 2020-08-20

## 2020-08-26 LAB
APPEARANCE UR: CLEAR — SIGNIFICANT CHANGE UP
BACTERIA # UR AUTO: NEGATIVE — SIGNIFICANT CHANGE UP
BILIRUB UR-MCNC: NEGATIVE — SIGNIFICANT CHANGE UP
COLOR SPEC: SIGNIFICANT CHANGE UP
DIFF PNL FLD: NEGATIVE — SIGNIFICANT CHANGE UP
EPI CELLS # UR: 2 /HPF — SIGNIFICANT CHANGE UP (ref 0–5)
GLUCOSE UR QL: NEGATIVE — SIGNIFICANT CHANGE UP
HYALINE CASTS # UR AUTO: 1 /LPF — SIGNIFICANT CHANGE UP (ref 0–7)
KETONES UR-MCNC: NEGATIVE — SIGNIFICANT CHANGE UP
LEUKOCYTE ESTERASE UR-ACNC: ABNORMAL
NITRITE UR-MCNC: NEGATIVE — SIGNIFICANT CHANGE UP
PH UR: 6 — SIGNIFICANT CHANGE UP (ref 5–8)
PROT UR-MCNC: NEGATIVE — SIGNIFICANT CHANGE UP
RBC CASTS # UR COMP ASSIST: 5 /HPF — HIGH (ref 0–4)
SP GR SPEC: 1.01 — SIGNIFICANT CHANGE UP (ref 1.01–1.02)
UROBILINOGEN FLD QL: SIGNIFICANT CHANGE UP
WBC UR QL: 8 /HPF — HIGH (ref 0–5)

## 2020-08-26 PROCEDURE — 71046 X-RAY EXAM CHEST 2 VIEWS: CPT | Mod: 26

## 2020-08-27 ENCOUNTER — APPOINTMENT (OUTPATIENT)
Dept: UROLOGY | Facility: CLINIC | Age: 55
End: 2020-08-27
Payer: COMMERCIAL

## 2020-08-27 VITALS
HEART RATE: 79 BPM | BODY MASS INDEX: 35.17 KG/M2 | WEIGHT: 206 LBS | HEIGHT: 64 IN | SYSTOLIC BLOOD PRESSURE: 120 MMHG | TEMPERATURE: 97.6 F | DIASTOLIC BLOOD PRESSURE: 78 MMHG

## 2020-08-27 LAB
CULTURE RESULTS: SIGNIFICANT CHANGE UP
SPECIMEN SOURCE: SIGNIFICANT CHANGE UP

## 2020-08-27 PROCEDURE — 51701 INSERT BLADDER CATHETER: CPT

## 2020-08-30 ENCOUNTER — OUTPATIENT (OUTPATIENT)
Dept: OUTPATIENT SERVICES | Facility: HOSPITAL | Age: 55
LOS: 1 days | Discharge: HOME | End: 2020-08-30

## 2020-08-30 ENCOUNTER — LABORATORY RESULT (OUTPATIENT)
Age: 55
End: 2020-08-30

## 2020-08-30 DIAGNOSIS — Z98.890 OTHER SPECIFIED POSTPROCEDURAL STATES: Chronic | ICD-10-CM

## 2020-08-30 DIAGNOSIS — Z90.710 ACQUIRED ABSENCE OF BOTH CERVIX AND UTERUS: Chronic | ICD-10-CM

## 2020-08-30 DIAGNOSIS — Z11.59 ENCOUNTER FOR SCREENING FOR OTHER VIRAL DISEASES: ICD-10-CM

## 2020-08-30 DIAGNOSIS — Z90.49 ACQUIRED ABSENCE OF OTHER SPECIFIED PARTS OF DIGESTIVE TRACT: Chronic | ICD-10-CM

## 2020-08-31 LAB
APPEARANCE: CLEAR
BILIRUBIN URINE: NEGATIVE
BLOOD URINE: NEGATIVE
COLOR: NORMAL
GLUCOSE QUALITATIVE U: ABNORMAL
KETONES URINE: NEGATIVE
LEUKOCYTE ESTERASE URINE: NEGATIVE
NITRITE URINE: NEGATIVE
PH URINE: 6
PROTEIN URINE: NEGATIVE
SPECIFIC GRAVITY URINE: 1.01
URINE CULTURE <10: NORMAL
UROBILINOGEN URINE: NORMAL

## 2020-09-02 ENCOUNTER — OUTPATIENT (OUTPATIENT)
Dept: OUTPATIENT SERVICES | Facility: HOSPITAL | Age: 55
LOS: 1 days | Discharge: HOME | End: 2020-09-02
Payer: COMMERCIAL

## 2020-09-02 ENCOUNTER — APPOINTMENT (OUTPATIENT)
Dept: UROLOGY | Facility: HOSPITAL | Age: 55
End: 2020-09-02

## 2020-09-02 VITALS
OXYGEN SATURATION: 98 % | SYSTOLIC BLOOD PRESSURE: 173 MMHG | HEIGHT: 64 IN | HEART RATE: 74 BPM | TEMPERATURE: 96 F | DIASTOLIC BLOOD PRESSURE: 84 MMHG | WEIGHT: 205.03 LBS | RESPIRATION RATE: 18 BRPM

## 2020-09-02 VITALS — HEART RATE: 67 BPM | SYSTOLIC BLOOD PRESSURE: 165 MMHG | RESPIRATION RATE: 17 BRPM | DIASTOLIC BLOOD PRESSURE: 78 MMHG

## 2020-09-02 DIAGNOSIS — Z98.890 OTHER SPECIFIED POSTPROCEDURAL STATES: Chronic | ICD-10-CM

## 2020-09-02 DIAGNOSIS — Z90.49 ACQUIRED ABSENCE OF OTHER SPECIFIED PARTS OF DIGESTIVE TRACT: Chronic | ICD-10-CM

## 2020-09-02 DIAGNOSIS — Z82.49 FAMILY HISTORY OF ISCHEMIC HEART DISEASE AND OTHER DISEASES OF THE CIRCULATORY SYSTEM: ICD-10-CM

## 2020-09-02 DIAGNOSIS — Z90.710 ACQUIRED ABSENCE OF BOTH CERVIX AND UTERUS: Chronic | ICD-10-CM

## 2020-09-02 LAB — GLUCOSE BLDC GLUCOMTR-MCNC: 174 MG/DL — HIGH (ref 70–99)

## 2020-09-02 PROCEDURE — 52332 CYSTOSCOPY AND TREATMENT: CPT | Mod: LT

## 2020-09-02 RX ORDER — ONDANSETRON 8 MG/1
4 TABLET, FILM COATED ORAL ONCE
Refills: 0 | Status: DISCONTINUED | OUTPATIENT
Start: 2020-09-02 | End: 2020-09-16

## 2020-09-02 RX ORDER — MORPHINE SULFATE 50 MG/1
2 CAPSULE, EXTENDED RELEASE ORAL
Refills: 0 | Status: DISCONTINUED | OUTPATIENT
Start: 2020-09-02 | End: 2020-09-02

## 2020-09-02 RX ORDER — HYDROMORPHONE HYDROCHLORIDE 2 MG/ML
0.5 INJECTION INTRAMUSCULAR; INTRAVENOUS; SUBCUTANEOUS
Refills: 0 | Status: DISCONTINUED | OUTPATIENT
Start: 2020-09-02 | End: 2020-09-02

## 2020-09-02 RX ORDER — SODIUM CHLORIDE 9 MG/ML
1000 INJECTION, SOLUTION INTRAVENOUS
Refills: 0 | Status: DISCONTINUED | OUTPATIENT
Start: 2020-09-02 | End: 2020-09-16

## 2020-09-02 RX ORDER — OXYCODONE AND ACETAMINOPHEN 5; 325 MG/1; MG/1
1 TABLET ORAL EVERY 4 HOURS
Refills: 0 | Status: DISCONTINUED | OUTPATIENT
Start: 2020-09-02 | End: 2020-09-02

## 2020-09-02 RX ADMIN — SODIUM CHLORIDE 100 MILLILITER(S): 9 INJECTION, SOLUTION INTRAVENOUS at 09:17

## 2020-09-02 NOTE — ASU PATIENT PROFILE, ADULT - PMH
DM (diabetes mellitus)    H/O pneumothorax  2012 complications post  gastric sleeve  HTN (hypertension)    Hydronephrosis, left    Obese    Pneumonia  2012 post gastric sleeve  Ureteral stone    Uterine cancer  1999

## 2020-09-02 NOTE — ASU DISCHARGE PLAN (ADULT/PEDIATRIC) - CALL YOUR DOCTOR IF YOU HAVE ANY OF THE FOLLOWING:
Excessive diarrhea/Inability to tolerate liquids or foods/Pain not relieved by Medications/Nausea and vomiting that does not stop/Bleeding that does not stop/Unable to urinate/Increased irritability or sluggishness/Fever greater than (need to indicate Fahrenheit or Celsius)

## 2020-09-02 NOTE — BRIEF OPERATIVE NOTE - NSICDXBRIEFPROCEDURE_GEN_ALL_CORE_FT
PROCEDURES:  Cystoscopy, rigid, with retrograde pyelogram and ureteral stent insertion 02-Sep-2020 08:36:13 left Hayley Lr

## 2020-09-02 NOTE — ASU PATIENT PROFILE, ADULT - PSH
H/O hernia repair    History of cholecystectomy    S/P gastric surgery  2000 BAND PROCEDURE & REMOVED  S/P gastric surgery  SLEEVE 2012 -post op complications. ICU X MANY WKS PER PT  S/P hysterectomy  1999

## 2020-09-02 NOTE — ASU DISCHARGE PLAN (ADULT/PEDIATRIC) - CARE PROVIDER_API CALL
Hayley Lr  Urology  [address] [place] [address], [address]  [place] [address]  Phone: [phone]  Fax: [phone]  Established Patient  Scheduled Appointment: 09/09/2020

## 2020-09-02 NOTE — BRIEF OPERATIVE NOTE - NSICDXBRIEFPOSTOP_GEN_ALL_CORE_FT
POST-OP DIAGNOSIS:  Pyonephrosis 02-Sep-2020 08:34:58  Hayley Lr  Left ureteral stone 02-Sep-2020 08:34:40  Hayley Lr

## 2020-09-04 ENCOUNTER — APPOINTMENT (OUTPATIENT)
Dept: OBGYN | Facility: CLINIC | Age: 55
End: 2020-09-04

## 2020-09-04 DIAGNOSIS — Z91.013 ALLERGY TO SEAFOOD: ICD-10-CM

## 2020-09-04 DIAGNOSIS — Z88.2 ALLERGY STATUS TO SULFONAMIDES: ICD-10-CM

## 2020-09-04 DIAGNOSIS — E66.9 OBESITY, UNSPECIFIED: ICD-10-CM

## 2020-09-04 DIAGNOSIS — N13.30 UNSPECIFIED HYDRONEPHROSIS: ICD-10-CM

## 2020-09-04 DIAGNOSIS — Z91.041 RADIOGRAPHIC DYE ALLERGY STATUS: ICD-10-CM

## 2020-09-04 DIAGNOSIS — N20.1 CALCULUS OF URETER: ICD-10-CM

## 2020-09-04 DIAGNOSIS — Z79.84 LONG TERM (CURRENT) USE OF ORAL HYPOGLYCEMIC DRUGS: ICD-10-CM

## 2020-09-04 DIAGNOSIS — Z88.0 ALLERGY STATUS TO PENICILLIN: ICD-10-CM

## 2020-09-04 DIAGNOSIS — I10 ESSENTIAL (PRIMARY) HYPERTENSION: ICD-10-CM

## 2020-09-04 DIAGNOSIS — Z91.040 LATEX ALLERGY STATUS: ICD-10-CM

## 2020-09-04 DIAGNOSIS — E11.9 TYPE 2 DIABETES MELLITUS WITHOUT COMPLICATIONS: ICD-10-CM

## 2020-09-08 ENCOUNTER — OUTPATIENT (OUTPATIENT)
Dept: OUTPATIENT SERVICES | Facility: HOSPITAL | Age: 55
LOS: 1 days | Discharge: HOME | End: 2020-09-08
Payer: COMMERCIAL

## 2020-09-08 VITALS
SYSTOLIC BLOOD PRESSURE: 142 MMHG | TEMPERATURE: 96 F | OXYGEN SATURATION: 99 % | RESPIRATION RATE: 16 BRPM | HEIGHT: 64 IN | DIASTOLIC BLOOD PRESSURE: 68 MMHG | HEART RATE: 64 BPM | WEIGHT: 199.96 LBS

## 2020-09-08 DIAGNOSIS — Z01.818 ENCOUNTER FOR OTHER PREPROCEDURAL EXAMINATION: ICD-10-CM

## 2020-09-08 DIAGNOSIS — Z90.710 ACQUIRED ABSENCE OF BOTH CERVIX AND UTERUS: Chronic | ICD-10-CM

## 2020-09-08 DIAGNOSIS — Z98.890 OTHER SPECIFIED POSTPROCEDURAL STATES: Chronic | ICD-10-CM

## 2020-09-08 DIAGNOSIS — N20.1 CALCULUS OF URETER: ICD-10-CM

## 2020-09-08 DIAGNOSIS — Z90.49 ACQUIRED ABSENCE OF OTHER SPECIFIED PARTS OF DIGESTIVE TRACT: Chronic | ICD-10-CM

## 2020-09-08 DIAGNOSIS — C61 MALIGNANT NEOPLASM OF PROSTATE: Chronic | ICD-10-CM

## 2020-09-08 LAB
ALBUMIN SERPL ELPH-MCNC: 4.3 G/DL — SIGNIFICANT CHANGE UP (ref 3.5–5.2)
ALP SERPL-CCNC: 73 U/L — SIGNIFICANT CHANGE UP (ref 30–115)
ALT FLD-CCNC: 18 U/L — SIGNIFICANT CHANGE UP (ref 0–41)
ANION GAP SERPL CALC-SCNC: 12 MMOL/L — SIGNIFICANT CHANGE UP (ref 7–14)
APPEARANCE UR: ABNORMAL
AST SERPL-CCNC: 24 U/L — SIGNIFICANT CHANGE UP (ref 0–41)
BACTERIA # UR AUTO: NEGATIVE — SIGNIFICANT CHANGE UP
BILIRUB SERPL-MCNC: 0.4 MG/DL — SIGNIFICANT CHANGE UP (ref 0.2–1.2)
BILIRUB UR-MCNC: NEGATIVE — SIGNIFICANT CHANGE UP
BUN SERPL-MCNC: 15 MG/DL — SIGNIFICANT CHANGE UP (ref 10–20)
CALCIUM SERPL-MCNC: 9.6 MG/DL — SIGNIFICANT CHANGE UP (ref 8.5–10.1)
CHLORIDE SERPL-SCNC: 103 MMOL/L — SIGNIFICANT CHANGE UP (ref 98–110)
CO2 SERPL-SCNC: 25 MMOL/L — SIGNIFICANT CHANGE UP (ref 17–32)
COLOR SPEC: YELLOW — SIGNIFICANT CHANGE UP
CREAT SERPL-MCNC: 0.7 MG/DL — SIGNIFICANT CHANGE UP (ref 0.7–1.5)
DIFF PNL FLD: ABNORMAL
EPI CELLS # UR: 5 /HPF — SIGNIFICANT CHANGE UP (ref 0–5)
GLUCOSE SERPL-MCNC: 247 MG/DL — HIGH (ref 70–99)
GLUCOSE UR QL: ABNORMAL
HYALINE CASTS # UR AUTO: 3 /LPF — SIGNIFICANT CHANGE UP (ref 0–7)
KETONES UR-MCNC: ABNORMAL
LEUKOCYTE ESTERASE UR-ACNC: ABNORMAL
NITRITE UR-MCNC: NEGATIVE — SIGNIFICANT CHANGE UP
PH UR: 6 — SIGNIFICANT CHANGE UP (ref 5–8)
POTASSIUM SERPL-MCNC: 4.8 MMOL/L — SIGNIFICANT CHANGE UP (ref 3.5–5)
POTASSIUM SERPL-SCNC: 4.8 MMOL/L — SIGNIFICANT CHANGE UP (ref 3.5–5)
PROT SERPL-MCNC: 6.8 G/DL — SIGNIFICANT CHANGE UP (ref 6–8)
PROT UR-MCNC: ABNORMAL
RBC CASTS # UR COMP ASSIST: 343 /HPF — HIGH (ref 0–4)
SODIUM SERPL-SCNC: 140 MMOL/L — SIGNIFICANT CHANGE UP (ref 135–146)
SP GR SPEC: 1.03 — HIGH (ref 1.01–1.02)
UROBILINOGEN FLD QL: SIGNIFICANT CHANGE UP
WBC UR QL: 34 /HPF — HIGH (ref 0–5)

## 2020-09-08 PROCEDURE — 93010 ELECTROCARDIOGRAM REPORT: CPT

## 2020-09-08 NOTE — H&P PST ADULT - NSICDXPASTSURGICALHX_GEN_ALL_CORE_FT
PAST SURGICAL HISTORY:  H/O hernia repair     History of cholecystectomy     Prostate cancer     S/P gastric surgery SLEEVE 2012 -post op complications. ICU X MANY WKS PER PT    S/P gastric surgery 2000 BAND PROCEDURE & REMOVED    S/P hysterectomy 1999, total

## 2020-09-08 NOTE — H&P PST ADULT - NSICDXFAMILYHX_GEN_ALL_CORE_FT
FAMILY HISTORY:  Family history of diabetes mellitus (DM), father  FH: CAD (coronary artery disease), father

## 2020-09-08 NOTE — H&P PST ADULT - REASON FOR ADMISSION
cystoscopy, left ureteroscopy lithotripsy of stone with urethral stent placement left ESWL scheduled for 9/15/2020 under general anesthesia  with Dr. Lr OR ZEKE cystoscopy, left ureteroscopy lithotripsy of stone with urethral stent placement scheduled for 9/15/2020 under general anesthesia  with Dr. Lr OR ZKEE

## 2020-09-08 NOTE — H&P PST ADULT - NSICDXPASTMEDICALHX_GEN_ALL_CORE_FT
PAST MEDICAL HISTORY:  DM (diabetes mellitus)     H/O pneumothorax 2012 complications post  gastric sleeve    HTN (hypertension)     Hydronephrosis, left     Obese     Pneumonia 2012 post gastric sleeve    Ureteral stone     Uterine cancer 1999

## 2020-09-08 NOTE — H&P PST ADULT - HISTORY OF PRESENT ILLNESS
54 y/o female with PMH of UTI, kindey stones, currently has a stent to left kidney, DM type II, HTN.Pt complains of bilateral CVA pain, frequest urination and discomfort while urinating for weeks.Denies any chest pain, difficulty breathing, SOB, palpitations, URI, or any other infections in the last 2 weeks/1 month. Denies any recent travel, contact, or exposure to any persons with known or suspected COVID-19. Pt also denies COVID testing within the last 2 weeks. Pt advised to self quarantine until day of procedure. Exercise tolerance of 2-3 flights of stairs without dyspnea. NIDIA reviewed with patient.    Anesthesia Alert  NO--Difficult Airway  NO--History of neck surgery or radiation  NO--Limited ROM of neck  NO--History of Malignant hyperthermia  NO--Personal or family history of Pseudocholinesterase deficiency  NO--Prior Anesthesia Complication  YES--Latex Allergy  NO--Loose teeth  NO--History of Rheumatoid Arthritis  NO--NIDIA  NO--Other_____ 54 y/o obese female with PMH of UTI, kindey stones, currently has a stent to left kidney, DM type II, HTN, pneumothorax, uterine cancer (1999) is scheduled for cystoscopy, left ureteroscopy lithotripsy of stone with urethral stent placement left ESWL on 9/15/2020  under general anesthesia  with Dr. Lr OR [facility]. Pt complains of bilateral CVA pain/tenderness,, frequent urination and discomfort while urinating for weeks. Denies fever and chills. Denies any chest pain, difficulty breathing, SOB, palpitations, URI, or any other infections in the last 2 weeks/1 month. Denies any recent travel, contact, or exposure to any persons with known or suspected COVID-19. Pt also denies COVID testing within the last 2 weeks. Pt advised to self quarantine until day of procedure. Exercise tolerance of 2-3 flights of stairs without dyspnea. NIDIA reviewed with patient.    Anesthesia Alert  NO--Difficult Airway  NO--History of neck surgery or radiation  NO--Limited ROM of neck  NO--History of Malignant hyperthermia  NO--Personal or family history of Pseudocholinesterase deficiency  NO--Prior Anesthesia Complication  YES--Latex Allergy  NO--Loose teeth  NO--History of Rheumatoid Arthritis  NO--NIDIA  NO--Other_____ 56 y/o obese female with PMH of UTI, kindey stones, currently has a stent to left kidney, DM type II, HTN, pneumothorax, uterine cancer (1999) is scheduled for cystoscopy, left ureteroscopy lithotripsy of stone with ureteral stent placement on 9/15/2020  under general anesthesia  with Dr. Lr [facility]. Pt complains of bilateral CVA pain/tenderness,, frequent urination and discomfort while urinating for weeks. Denies fever and chills. Denies any chest pain, difficulty breathing, SOB, palpitations, URI, or any other infections in the last 2 weeks/1 month. Denies any recent travel, contact, or exposure to any persons with known or suspected COVID-19. Pt also denies COVID testing within the last 2 weeks. Pt advised to self quarantine until day of procedure. Exercise tolerance of 2-3 flights of stairs without dyspnea. NIDIA reviewed with patient.  She had aprior attempt  2 weeks ago but was left with JJ due to cloudy urine    Anesthesia Alert  NO--Difficult Airway  NO--History of neck surgery or radiation  NO--Limited ROM of neck  NO--History of Malignant hyperthermia  NO--Personal or family history of Pseudocholinesterase deficiency  NO--Prior Anesthesia Complication  YES--Latex Allergy  NO--Loose teeth  NO--History of Rheumatoid Arthritis  NO--NIDIA  NO--Other_____

## 2020-09-08 NOTE — H&P PST ADULT - CENTRAL VENOUS CATHETER
SURVEY:    You may be receiving a survey from [employer] regarding your visit today. Please complete the survey to enable us to provide the highest quality of care to you and your family. If you cannot score us a very good on any question, please call the office to discuss how we could have made your experience a very good one. Thank you.
no

## 2020-09-09 ENCOUNTER — LABORATORY RESULT (OUTPATIENT)
Age: 55
End: 2020-09-09

## 2020-09-09 ENCOUNTER — APPOINTMENT (OUTPATIENT)
Dept: UROLOGY | Facility: CLINIC | Age: 55
End: 2020-09-09
Payer: COMMERCIAL

## 2020-09-09 ENCOUNTER — OUTPATIENT (OUTPATIENT)
Dept: OUTPATIENT SERVICES | Facility: HOSPITAL | Age: 55
LOS: 1 days | Discharge: HOME | End: 2020-09-09
Payer: COMMERCIAL

## 2020-09-09 VITALS
WEIGHT: 203 LBS | TEMPERATURE: 97.9 F | HEART RATE: 83 BPM | DIASTOLIC BLOOD PRESSURE: 75 MMHG | BODY MASS INDEX: 34.66 KG/M2 | HEIGHT: 64 IN | SYSTOLIC BLOOD PRESSURE: 125 MMHG

## 2020-09-09 DIAGNOSIS — N20.1 CALCULUS OF URETER: ICD-10-CM

## 2020-09-09 DIAGNOSIS — Z98.890 OTHER SPECIFIED POSTPROCEDURAL STATES: Chronic | ICD-10-CM

## 2020-09-09 DIAGNOSIS — N13.30 UNSPECIFIED HYDRONEPHROSIS: ICD-10-CM

## 2020-09-09 DIAGNOSIS — Z90.710 ACQUIRED ABSENCE OF BOTH CERVIX AND UTERUS: Chronic | ICD-10-CM

## 2020-09-09 DIAGNOSIS — C61 MALIGNANT NEOPLASM OF PROSTATE: Chronic | ICD-10-CM

## 2020-09-09 DIAGNOSIS — Z90.49 ACQUIRED ABSENCE OF OTHER SPECIFIED PARTS OF DIGESTIVE TRACT: Chronic | ICD-10-CM

## 2020-09-09 LAB
CULTURE RESULTS: SIGNIFICANT CHANGE UP
SPECIMEN SOURCE: SIGNIFICANT CHANGE UP

## 2020-09-09 PROCEDURE — 74019 RADEX ABDOMEN 2 VIEWS: CPT | Mod: 26

## 2020-09-09 PROCEDURE — 99214 OFFICE O/P EST MOD 30 MIN: CPT

## 2020-09-09 RX ORDER — NITROFURANTOIN (MONOHYDRATE/MACROCRYSTALS) 25; 75 MG/1; MG/1
100 CAPSULE ORAL
Qty: 14 | Refills: 0 | Status: COMPLETED | COMMUNITY
Start: 2020-09-02 | End: 2020-09-09

## 2020-09-09 NOTE — ASSESSMENT
[FreeTextEntry1] : sxs within expected\par OR tuesday \par note she was supposed to do a KUB to see if the stone moved She had not done it so we sent her and had her come back in to review it in the interim we will send urine off for a UA / C&S\par \par She came back in\par KUB shows stone is still in the upper ureter eswl success is about 80% and she is too uncomfortable to wait and watch

## 2020-09-09 NOTE — HISTORY OF PRESENT ILLNESS
[Urinary Incontinence] : urinary incontinence [Urinary Urgency] : urinary urgency [Dysuria] : dysuria [Left Flank] : left flank [FreeTextEntry1] : Mary is 55-year-old female who had a stent put in front obstructing upper left ureteral stone on September 2. She comes in today telling me that she has significant dysuria, severe yeast infection, she finished the Macrobid yesterday and wants to know what the next step is.\par \par She has had no fever and wonders if the vaginitis is due to the constant urination [Currently Experiencing ___] :  [unfilled]

## 2020-09-09 NOTE — LETTER BODY
[Dear  ___] : Dear  [unfilled], [Please see my note below.] : Please see my note below. [Courtesy Letter:] : I had the pleasure of seeing your patient, [unfilled], in my office today. [Sincerely,] : Sincerely, [FreeTextEntry2] : Puar Guerrier MD\par [address]\[place] [address]\par

## 2020-09-09 NOTE — LETTER HEADER
[FreeTextEntry3] : Hayley Lr M.D.\par Director of Urology\par [facility]\par [address] [place] [address], [address]\[place] [address]

## 2020-09-09 NOTE — PHYSICAL EXAM
[General Appearance - Well Developed] : well developed [General Appearance - Well Nourished] : well nourished [Well Groomed] : well groomed [Normal Appearance] : normal appearance [General Appearance - In No Acute Distress] : no acute distress [Abdomen Soft] : soft [Abdomen Tenderness] : non-tender [Costovertebral Angle Tenderness] : no ~M costovertebral angle tenderness [] : no respiratory distress [Respiration, Rhythm And Depth] : normal respiratory rhythm and effort [Oriented To Time, Place, And Person] : oriented to person, place, and time [Exaggerated Use Of Accessory Muscles For Inspiration] : no accessory muscle use [Mood] : the mood was normal [Affect] : the affect was normal [Not Anxious] : not anxious [Normal Station and Gait] : the gait and station were normal for the patient's age [No Focal Deficits] : no focal deficits [FreeTextEntry1] : mild edema

## 2020-09-10 LAB
APPEARANCE: ABNORMAL
BILIRUBIN URINE: NEGATIVE
BLOOD URINE: ABNORMAL
COLOR: YELLOW
GLUCOSE QUALITATIVE U: ABNORMAL
KETONES URINE: NORMAL
LEUKOCYTE ESTERASE URINE: ABNORMAL
NITRITE URINE: NEGATIVE
PH URINE: 6
PROTEIN URINE: ABNORMAL
SPECIFIC GRAVITY URINE: 1.03
UROBILINOGEN URINE: NORMAL

## 2020-09-12 ENCOUNTER — OUTPATIENT (OUTPATIENT)
Dept: OUTPATIENT SERVICES | Facility: HOSPITAL | Age: 55
LOS: 1 days | Discharge: HOME | End: 2020-09-12

## 2020-09-12 ENCOUNTER — LABORATORY RESULT (OUTPATIENT)
Age: 55
End: 2020-09-12

## 2020-09-12 DIAGNOSIS — C61 MALIGNANT NEOPLASM OF PROSTATE: Chronic | ICD-10-CM

## 2020-09-12 DIAGNOSIS — Z90.710 ACQUIRED ABSENCE OF BOTH CERVIX AND UTERUS: Chronic | ICD-10-CM

## 2020-09-12 DIAGNOSIS — Z98.890 OTHER SPECIFIED POSTPROCEDURAL STATES: Chronic | ICD-10-CM

## 2020-09-12 DIAGNOSIS — Z11.59 ENCOUNTER FOR SCREENING FOR OTHER VIRAL DISEASES: ICD-10-CM

## 2020-09-12 DIAGNOSIS — Z90.49 ACQUIRED ABSENCE OF OTHER SPECIFIED PARTS OF DIGESTIVE TRACT: Chronic | ICD-10-CM

## 2020-09-14 ENCOUNTER — LABORATORY RESULT (OUTPATIENT)
Age: 55
End: 2020-09-14

## 2020-09-14 ENCOUNTER — APPOINTMENT (OUTPATIENT)
Dept: UROLOGY | Facility: CLINIC | Age: 55
End: 2020-09-14
Payer: COMMERCIAL

## 2020-09-14 VITALS
HEIGHT: 64 IN | HEART RATE: 97 BPM | DIASTOLIC BLOOD PRESSURE: 79 MMHG | WEIGHT: 203 LBS | TEMPERATURE: 97.5 F | SYSTOLIC BLOOD PRESSURE: 135 MMHG | BODY MASS INDEX: 34.66 KG/M2

## 2020-09-14 LAB — BACTERIA UR CULT: NORMAL

## 2020-09-14 PROCEDURE — 51701 INSERT BLADDER CATHETER: CPT

## 2020-09-14 NOTE — ASU PATIENT PROFILE, ADULT - NSALCOHOLLASTUSE_GEN_A_CORE_DT
Problem: Patient Care Overview  Goal: Plan of Care Review  Outcome: Ongoing (interventions implemented as appropriate)   05/19/19 8040   Coping/Psychosocial   Plan of Care Reviewed With patient   OTHER   Outcome Summary AAO x all but some forgetfulness. VSS except BP elevated at times. Taking PO well. Up to BSC with help--unsteady. Voiding well.Needs CPAP tonight-unable to reach family to bring his machine. Messages left. Mag replaced and order entered to draw recheck in AM. CO2 elevated this am and was lethargic-MD notified and ABG drawn. Compensating/CO2 retainer. O2 weaned down to 2lpm and has sats 92 to 93 most of the time. Conversing with girlfriend on phone-she is unable to come here. Will continue to monitor.    Plan of Care Review   Progress improving       Problem: Pneumonia (Adult)  Goal: Signs and Symptoms of Listed Potential Problems Will be Absent, Minimized or Managed (Pneumonia)  Outcome: Ongoing (interventions implemented as appropriate)      Problem: Pain, Acute (Adult)  Goal: Acceptable Pain Control/Comfort Level  Outcome: Ongoing (interventions implemented as appropriate)      Problem: Skin Injury Risk (Adult)  Goal: Skin Health and Integrity  Outcome: Ongoing (interventions implemented as appropriate)      Problem: Fall Risk (Adult)  Goal: Absence of Fall  Outcome: Ongoing (interventions implemented as appropriate)         08-Sep-2020

## 2020-09-14 NOTE — ASU PATIENT PROFILE, ADULT - PSH
H/O hernia repair    History of cholecystectomy    Prostate cancer    S/P gastric surgery  2000 BAND PROCEDURE & REMOVED  S/P gastric surgery  SLEEVE 2012 -post op complications. ICU X MANY WKS PER PT  S/P hysterectomy  1999, total

## 2020-09-15 ENCOUNTER — OUTPATIENT (OUTPATIENT)
Dept: OUTPATIENT SERVICES | Facility: HOSPITAL | Age: 55
LOS: 1 days | Discharge: HOME | End: 2020-09-15
Payer: COMMERCIAL

## 2020-09-15 ENCOUNTER — APPOINTMENT (OUTPATIENT)
Dept: UROLOGY | Facility: HOSPITAL | Age: 55
End: 2020-09-15

## 2020-09-15 VITALS
HEIGHT: 64 IN | OXYGEN SATURATION: 99 % | DIASTOLIC BLOOD PRESSURE: 67 MMHG | WEIGHT: 203.93 LBS | TEMPERATURE: 96 F | SYSTOLIC BLOOD PRESSURE: 145 MMHG | RESPIRATION RATE: 17 BRPM | HEART RATE: 68 BPM

## 2020-09-15 VITALS — RESPIRATION RATE: 15 BRPM | DIASTOLIC BLOOD PRESSURE: 67 MMHG | SYSTOLIC BLOOD PRESSURE: 130 MMHG | HEART RATE: 64 BPM

## 2020-09-15 DIAGNOSIS — Z98.890 OTHER SPECIFIED POSTPROCEDURAL STATES: Chronic | ICD-10-CM

## 2020-09-15 DIAGNOSIS — Z88.2 ALLERGY STATUS TO SULFONAMIDES: ICD-10-CM

## 2020-09-15 DIAGNOSIS — Z88.8 ALLERGY STATUS TO OTHER DRUGS, MEDICAMENTS AND BIOLOGICAL SUBSTANCES: ICD-10-CM

## 2020-09-15 DIAGNOSIS — Z90.710 ACQUIRED ABSENCE OF BOTH CERVIX AND UTERUS: Chronic | ICD-10-CM

## 2020-09-15 DIAGNOSIS — Z90.49 ACQUIRED ABSENCE OF OTHER SPECIFIED PARTS OF DIGESTIVE TRACT: Chronic | ICD-10-CM

## 2020-09-15 DIAGNOSIS — Z88.6 ALLERGY STATUS TO ANALGESIC AGENT: ICD-10-CM

## 2020-09-15 DIAGNOSIS — C61 MALIGNANT NEOPLASM OF PROSTATE: Chronic | ICD-10-CM

## 2020-09-15 LAB — GLUCOSE BLDC GLUCOMTR-MCNC: 247 MG/DL — HIGH (ref 70–99)

## 2020-09-15 PROCEDURE — 74018 RADEX ABDOMEN 1 VIEW: CPT | Mod: 26

## 2020-09-15 PROCEDURE — 52352 CYSTOURETERO W/STONE REMOVE: CPT | Mod: LT

## 2020-09-15 PROCEDURE — 52332 CYSTOSCOPY AND TREATMENT: CPT | Mod: LT

## 2020-09-15 RX ORDER — ONDANSETRON 8 MG/1
4 TABLET, FILM COATED ORAL ONCE
Refills: 0 | Status: DISCONTINUED | OUTPATIENT
Start: 2020-09-15 | End: 2020-09-15

## 2020-09-15 RX ORDER — HYDROMORPHONE HYDROCHLORIDE 2 MG/ML
0.5 INJECTION INTRAMUSCULAR; INTRAVENOUS; SUBCUTANEOUS ONCE
Refills: 0 | Status: DISCONTINUED | OUTPATIENT
Start: 2020-09-15 | End: 2020-09-15

## 2020-09-15 RX ORDER — SODIUM CHLORIDE 9 MG/ML
1000 INJECTION, SOLUTION INTRAVENOUS
Refills: 0 | Status: DISCONTINUED | OUTPATIENT
Start: 2020-09-15 | End: 2020-09-15

## 2020-09-15 RX ADMIN — SODIUM CHLORIDE 75 MILLILITER(S): 9 INJECTION, SOLUTION INTRAVENOUS at 10:07

## 2020-09-15 NOTE — CHART NOTE - NSCHARTNOTEFT_GEN_A_CORE
PACU ANESTHESIA ADMISSION NOTE      Procedure:   Post op diagnosis:      ____  Intubated  TV:______       Rate: ______      FiO2: ______    __x__  Patent Airway    _x___  Full return of protective reflexes    __x__  Full recovery from anesthesia / back to baseline status    Vitals:  T(C): 35.8 (09-15-20 @ 07:57), Max: 35.8 (09-15-20 @ 07:50)  HR: 68 (09-15-20 @ 07:50) (68 - 68)  BP: 145/67 (09-15-20 @ 07:57) (145/67 - 145/67)  RR: 17 (09-15-20 @ 07:57) (17 - 17)  SpO2: 99% (09-15-20 @ 07:50) (99% - 99%)    Mental Status:  _x___ Awake   ___x__ Alert   _____ Drowsy   _____ Sedated    Nausea/Vomiting:  ____ NO  __x____Yes,   See Post - Op Orders          Pain Scale (0-10):  _____    Treatment: ____ None    ___x_ See Post - Op/PCA Orders    Post - Operative Fluids:   ____ Oral   __x__ See Post - Op Orders    Plan: Discharge:   __x__Home       _____Floor     _____Critical Care    _____  Other:_________________    Comments: uneventful anesthesia course no complications. VItals stable. Pt transferred to PACU

## 2020-09-15 NOTE — ASU DISCHARGE PLAN (ADULT/PEDIATRIC) - CARE PROVIDER_API CALL
Hayley Lr  Urology  [address] [place] [address], [address]  [place] [address]  Phone: [phone]  Fax: [phone]  Established Patient  Scheduled Appointment: 09/18/2020 09:00 AM

## 2020-09-15 NOTE — ASU DISCHARGE PLAN (ADULT/PEDIATRIC) - CALL YOUR DOCTOR IF YOU HAVE ANY OF THE FOLLOWING:
Fever greater than (need to indicate Fahrenheit or Celsius)/Bleeding that does not stop/Swelling that gets worse/Numbness, tingling, color or temperature change to extremity/Nausea and vomiting that does not stop/Pain not relieved by Medications/Increased irritability or sluggishness/Unable to urinate/Inability to tolerate liquids or foods

## 2020-09-15 NOTE — BRIEF OPERATIVE NOTE - NSICDXBRIEFPROCEDURE_GEN_ALL_CORE_FT
PROCEDURES:  Ureteroscopy, with calculus removal 15-Sep-2020 09:43:43 ermoval and replacement of left JJ Hayley Lr

## 2020-09-15 NOTE — ASU DISCHARGE PLAN (ADULT/PEDIATRIC) - PROVIDER TOKENS
PROVIDER:[TOKEN:[[id number]],SCHEDULEDAPPT:[09/18/2020],SCHEDULEDAPPTTIME:[09:00 AM],ESTABLISHEDPATIENT:[T]]

## 2020-09-16 LAB
APPEARANCE: ABNORMAL
BILIRUBIN URINE: NEGATIVE
BLOOD URINE: ABNORMAL
COLOR: YELLOW
GLUCOSE QUALITATIVE U: ABNORMAL
KETONES URINE: NORMAL
LEUKOCYTE ESTERASE URINE: ABNORMAL
NITRITE URINE: NEGATIVE
PH URINE: 5.5
PROTEIN URINE: ABNORMAL
SPECIFIC GRAVITY URINE: 1.02
UROBILINOGEN URINE: NORMAL

## 2020-09-17 DIAGNOSIS — E11.9 TYPE 2 DIABETES MELLITUS WITHOUT COMPLICATIONS: ICD-10-CM

## 2020-09-17 DIAGNOSIS — I10 ESSENTIAL (PRIMARY) HYPERTENSION: ICD-10-CM

## 2020-09-17 DIAGNOSIS — N13.2 HYDRONEPHROSIS WITH RENAL AND URETERAL CALCULOUS OBSTRUCTION: ICD-10-CM

## 2020-09-17 DIAGNOSIS — Z79.4 LONG TERM (CURRENT) USE OF INSULIN: ICD-10-CM

## 2020-09-17 DIAGNOSIS — Z85.42 PERSONAL HISTORY OF MALIGNANT NEOPLASM OF OTHER PARTS OF UTERUS: ICD-10-CM

## 2020-09-17 DIAGNOSIS — Z91.041 RADIOGRAPHIC DYE ALLERGY STATUS: ICD-10-CM

## 2020-09-17 DIAGNOSIS — Z88.8 ALLERGY STATUS TO OTHER DRUGS, MEDICAMENTS AND BIOLOGICAL SUBSTANCES: ICD-10-CM

## 2020-09-17 DIAGNOSIS — Z91.040 LATEX ALLERGY STATUS: ICD-10-CM

## 2020-09-17 DIAGNOSIS — Z88.2 ALLERGY STATUS TO SULFONAMIDES: ICD-10-CM

## 2020-09-17 DIAGNOSIS — Z88.6 ALLERGY STATUS TO ANALGESIC AGENT: ICD-10-CM

## 2020-09-17 DIAGNOSIS — Z88.0 ALLERGY STATUS TO PENICILLIN: ICD-10-CM

## 2020-09-17 DIAGNOSIS — Z90.49 ACQUIRED ABSENCE OF OTHER SPECIFIED PARTS OF DIGESTIVE TRACT: ICD-10-CM

## 2020-09-17 DIAGNOSIS — Z90.710 ACQUIRED ABSENCE OF BOTH CERVIX AND UTERUS: ICD-10-CM

## 2020-09-17 DIAGNOSIS — Z98.84 BARIATRIC SURGERY STATUS: ICD-10-CM

## 2020-09-17 DIAGNOSIS — N20.1 CALCULUS OF URETER: ICD-10-CM

## 2020-09-17 DIAGNOSIS — E66.9 OBESITY, UNSPECIFIED: ICD-10-CM

## 2020-09-17 DIAGNOSIS — Z20.828 CONTACT WITH AND (SUSPECTED) EXPOSURE TO OTHER VIRAL COMMUNICABLE DISEASES: ICD-10-CM

## 2020-09-17 DIAGNOSIS — Z91.013 ALLERGY TO SEAFOOD: ICD-10-CM

## 2020-09-17 LAB — URINE CULTURE <10: NORMAL

## 2020-09-18 ENCOUNTER — APPOINTMENT (OUTPATIENT)
Dept: UROLOGY | Facility: CLINIC | Age: 55
End: 2020-09-18
Payer: COMMERCIAL

## 2020-09-18 VITALS — BODY MASS INDEX: 34.66 KG/M2 | HEIGHT: 64 IN | TEMPERATURE: 97.3 F | WEIGHT: 203 LBS

## 2020-09-18 DIAGNOSIS — N76.0 ACUTE VAGINITIS: ICD-10-CM

## 2020-09-18 DIAGNOSIS — R82.71 BACTERIURIA: ICD-10-CM

## 2020-09-18 LAB — NIDUS STONE QN: SIGNIFICANT CHANGE UP

## 2020-09-18 PROCEDURE — 99214 OFFICE O/P EST MOD 30 MIN: CPT

## 2020-09-18 RX ORDER — ALFUZOSIN HYDROCHLORIDE 10 MG/1
10 TABLET, EXTENDED RELEASE ORAL DAILY
Qty: 30 | Refills: 1 | Status: COMPLETED | COMMUNITY
Start: 2020-08-14 | End: 2020-09-18

## 2020-09-18 RX ORDER — FLUCONAZOLE 150 MG/1
150 TABLET ORAL
Qty: 5 | Refills: 0 | Status: COMPLETED | COMMUNITY
Start: 2020-03-04 | End: 2020-09-18

## 2020-09-18 RX ORDER — CLINDAMYCIN HCL
POWDER (GRAM) MISCELLANEOUS
Refills: 0 | Status: COMPLETED | COMMUNITY
End: 2020-09-18

## 2020-09-18 RX ORDER — ACETAMINOPHEN 325 MG/1
TABLET, FILM COATED ORAL
Refills: 0 | Status: ACTIVE | COMMUNITY

## 2020-09-18 RX ORDER — TRAMADOL HYDROCHLORIDE 50 MG/1
50 TABLET, COATED ORAL
Qty: 20 | Refills: 0 | Status: COMPLETED | COMMUNITY
Start: 2020-08-14 | End: 2020-09-18

## 2020-09-18 NOTE — HISTORY OF PRESENT ILLNESS
[FreeTextEntry1] : s/p left ureteroscopy c/o few small clots mild discomfort\par urine now clear\par felt chills this AM but no fever [Abdominal Pain] : abdominal pain [1] : 1 [Lower Abdomen] : lower abdomen [Aching] : aching [Gradual] : gradual [___ Day(s) Ago] : [unfilled] day(s) ago [Intermittent] : intermittently [Improving] : improving [None] : No relieving factors are noted

## 2020-09-18 NOTE — ASSESSMENT
[FreeTextEntry1] : complex course to get rid of the stone\par now vague sxs but voiding ok and gu issues appear to be resolving\sarah has a Hx of anxiety and she was very worried about this and her job and her Dad\par \par stone "talk" dehydration / diet issues Calcium, Salt, animal Protein, low citrate\par volume 2 liters urine per day

## 2020-09-18 NOTE — LETTER BODY
[Dear  ___] : Dear  [unfilled], [Courtesy Letter:] : I had the pleasure of seeing your patient, [unfilled], in my office today. [Please see my note below.] : Please see my note below. [Sincerely,] : Sincerely, [FreeTextEntry2] : Pura Guerrier MD\par [address] [facility]\[place] [address]\par

## 2020-09-18 NOTE — LETTER HEADER
[FreeTextEntry3] : Hayley Lr M.D.\par Director of Urology\par [facility]\par [address] [place] [address], [address]\[place] [address]

## 2020-11-03 ENCOUNTER — OUTPATIENT (OUTPATIENT)
Dept: OUTPATIENT SERVICES | Facility: HOSPITAL | Age: 55
LOS: 1 days | Discharge: HOME | End: 2020-11-03
Payer: COMMERCIAL

## 2020-11-03 ENCOUNTER — RESULT REVIEW (OUTPATIENT)
Age: 55
End: 2020-11-03

## 2020-11-03 DIAGNOSIS — N20.1 CALCULUS OF URETER: ICD-10-CM

## 2020-11-03 DIAGNOSIS — Z90.710 ACQUIRED ABSENCE OF BOTH CERVIX AND UTERUS: Chronic | ICD-10-CM

## 2020-11-03 DIAGNOSIS — C61 MALIGNANT NEOPLASM OF PROSTATE: Chronic | ICD-10-CM

## 2020-11-03 DIAGNOSIS — Z98.890 OTHER SPECIFIED POSTPROCEDURAL STATES: Chronic | ICD-10-CM

## 2020-11-03 DIAGNOSIS — Z90.49 ACQUIRED ABSENCE OF OTHER SPECIFIED PARTS OF DIGESTIVE TRACT: Chronic | ICD-10-CM

## 2020-11-03 PROCEDURE — 76770 US EXAM ABDO BACK WALL COMP: CPT | Mod: 26

## 2020-11-23 ENCOUNTER — APPOINTMENT (OUTPATIENT)
Dept: UROLOGY | Facility: CLINIC | Age: 55
End: 2020-11-23
Payer: COMMERCIAL

## 2020-11-23 VITALS
WEIGHT: 210 LBS | HEIGHT: 64 IN | TEMPERATURE: 97.4 F | SYSTOLIC BLOOD PRESSURE: 129 MMHG | DIASTOLIC BLOOD PRESSURE: 74 MMHG | BODY MASS INDEX: 35.85 KG/M2 | HEART RATE: 91 BPM

## 2020-11-23 PROCEDURE — 99213 OFFICE O/P EST LOW 20 MIN: CPT

## 2020-11-23 RX ORDER — ATORVASTATIN CALCIUM 10 MG/1
10 TABLET, FILM COATED ORAL
Qty: 90 | Refills: 0 | Status: ACTIVE | COMMUNITY
Start: 2020-07-28

## 2020-11-23 RX ORDER — BLOOD SUGAR DIAGNOSTIC
STRIP MISCELLANEOUS
Qty: 600 | Refills: 0 | Status: ACTIVE | COMMUNITY
Start: 2019-11-05

## 2020-11-23 RX ORDER — METFORMIN ER 500 MG 500 MG/1
500 TABLET ORAL
Qty: 360 | Refills: 0 | Status: ACTIVE | COMMUNITY
Start: 2020-10-15

## 2020-11-23 RX ORDER — ALPRAZOLAM 0.5 MG/1
0.5 TABLET ORAL
Qty: 60 | Refills: 0 | Status: ACTIVE | COMMUNITY
Start: 2020-06-08

## 2020-11-23 RX ORDER — PEN NEEDLE, DIABETIC 32GX 5/32"
32G X 4 MM NEEDLE, DISPOSABLE MISCELLANEOUS
Qty: 400 | Refills: 0 | Status: ACTIVE | COMMUNITY
Start: 2020-06-03

## 2020-11-23 RX ORDER — PEN NEEDLE, DIABETIC 32 GX 1/4"
32G X 6 MM NEEDLE, DISPOSABLE MISCELLANEOUS
Qty: 100 | Refills: 0 | Status: ACTIVE | COMMUNITY
Start: 2020-04-28

## 2020-11-23 RX ORDER — INSULIN DETEMIR 100 [IU]/ML
100 INJECTION, SOLUTION SUBCUTANEOUS
Qty: 36 | Refills: 0 | Status: ACTIVE | COMMUNITY
Start: 2020-07-28

## 2020-11-23 RX ORDER — LANCETS
EACH MISCELLANEOUS
Qty: 102 | Refills: 0 | Status: ACTIVE | COMMUNITY
Start: 2019-11-05

## 2020-11-25 NOTE — LETTER BODY
[Dear  ___] : Dear  [unfilled], [Courtesy Letter:] : I had the pleasure of seeing your patient, [unfilled], in my office today. [Please see my note below.] : Please see my note below. [Sincerely,] : Sincerely, [FreeTextEntry2] : Pura Guerrier MD\par [address] [facility]\[place] [address]\par

## 2020-11-25 NOTE — HISTORY OF PRESENT ILLNESS
[FreeTextEntry1] : Mary had left ureteroscopy laser lithotripsy stone basketing on September 15, 2020. She was last seen on at which time we had requested metabolic workup a renal ultrasound follow-up urinalysis and culture and then follow-up now for review. She had the ultrasound she did the 20 fair urine she says times two but the lab only has one she also says she had a lab showing a UTI by her other Dr. but I don’t have the reports.\par \par As a totality she is feeling fine and wants to know what she can do to prevent this from happening in the future.

## 2020-11-25 NOTE — ASSESSMENT
[FreeTextEntry1] : we discussed the abnormalities in a metabolic workup, we will need a repeat usually I’d like to before we start treatment but in the current Covid climate and in the hopes of limiting traffic regress going to assume this is representative. We gave her some brochures to read at home she will repeat the 20 for her urine after making whatever behavioral changes she can over the next six weeks she will follow-up when the results are available. Whether it is TeleMed or in office will depend on the status of the pandemic at that time

## 2020-11-25 NOTE — LETTER HEADER
[FreeTextEntry3] : Hayley Lr M.D.\par Director of Urology\par [facility]\par [address] [place] [address], [address]\[place] [address]

## 2020-12-02 ENCOUNTER — TRANSCRIPTION ENCOUNTER (OUTPATIENT)
Age: 55
End: 2020-12-02

## 2021-01-22 ENCOUNTER — ASOB RESULT (OUTPATIENT)
Age: 56
End: 2021-01-22

## 2021-01-22 ENCOUNTER — APPOINTMENT (OUTPATIENT)
Dept: OBGYN | Facility: CLINIC | Age: 56
End: 2021-01-22
Payer: COMMERCIAL

## 2021-01-22 VITALS
WEIGHT: 210 LBS | SYSTOLIC BLOOD PRESSURE: 137 MMHG | HEART RATE: 73 BPM | BODY MASS INDEX: 35.85 KG/M2 | TEMPERATURE: 97.5 F | HEIGHT: 64 IN | DIASTOLIC BLOOD PRESSURE: 80 MMHG

## 2021-01-22 DIAGNOSIS — Z01.419 ENCOUNTER FOR GYNECOLOGICAL EXAMINATION (GENERAL) (ROUTINE) W/OUT ABNORMAL FINDINGS: ICD-10-CM

## 2021-01-22 LAB
BILIRUB UR QL STRIP: NORMAL
CLARITY UR: CLEAR
COLLECTION METHOD: NORMAL
GLUCOSE UR-MCNC: ABNORMAL
HCG UR QL: 0.2 EU/DL
HGB UR QL STRIP.AUTO: NORMAL
KETONES UR-MCNC: NORMAL
LEUKOCYTE ESTERASE UR QL STRIP: NORMAL
NITRITE UR QL STRIP: NORMAL
PH UR STRIP: 5.5
PROT UR STRIP-MCNC: NORMAL
SP GR UR STRIP: 1.02

## 2021-01-22 PROCEDURE — 99072 ADDL SUPL MATRL&STAF TM PHE: CPT

## 2021-01-22 PROCEDURE — 76857 US EXAM PELVIC LIMITED: CPT | Mod: 59

## 2021-01-22 PROCEDURE — 99396 PREV VISIT EST AGE 40-64: CPT

## 2021-01-22 PROCEDURE — 76830 TRANSVAGINAL US NON-OB: CPT

## 2021-01-22 PROCEDURE — 81003 URINALYSIS AUTO W/O SCOPE: CPT | Mod: QW

## 2021-01-25 ENCOUNTER — APPOINTMENT (OUTPATIENT)
Dept: UROLOGY | Facility: CLINIC | Age: 56
End: 2021-01-25
Payer: COMMERCIAL

## 2021-01-25 VITALS
WEIGHT: 211.4 LBS | TEMPERATURE: 96.9 F | SYSTOLIC BLOOD PRESSURE: 132 MMHG | DIASTOLIC BLOOD PRESSURE: 80 MMHG | HEART RATE: 84 BPM | HEIGHT: 64 IN | BODY MASS INDEX: 36.09 KG/M2

## 2021-01-25 PROCEDURE — 99214 OFFICE O/P EST MOD 30 MIN: CPT

## 2021-01-25 PROCEDURE — 99072 ADDL SUPL MATRL&STAF TM PHE: CPT

## 2021-01-25 NOTE — LETTER HEADER
[FreeTextEntry3] : Hayley Lr M.D.\par Director of Urology\par [facility]\par [address] [place] [address], [address]\[place] [address]

## 2021-01-25 NOTE — HISTORY OF PRESENT ILLNESS
[None] : no symptoms [FreeTextEntry1] : Mary is a 55-year-old female that had a double J followed by ureteroscopy in September 2020. We've given her advice on how to change her behavior in the hopes of avoiding stones she had a repeat metabolic workup done on January 15, 2021 and is here for review. She tells me she is feeling good and has no symptoms.

## 2021-01-25 NOTE — LETTER BODY
[Dear  ___] : Dear  [unfilled], [Courtesy Letter:] : I had the pleasure of seeing your patient, [unfilled], in my office today. [Please see my note below.] : Please see my note below. [Sincerely,] : Sincerely, [FreeTextEntry2] : Pura Guerrier MD\par [address] [facility]\[place] [address]\par

## 2021-01-25 NOTE — ASSESSMENT
[FreeTextEntry1] : The numbers are really not good, I think of taken her but as far as I can and I recommended she see both the nutritionist as well as a nephrologist especially given the acidity despite the high citrate dose.

## 2021-01-26 LAB
C TRACH RRNA SPEC QL NAA+PROBE: NOT DETECTED
HPV HIGH+LOW RISK DNA PNL CVX: NOT DETECTED
N GONORRHOEA RRNA SPEC QL NAA+PROBE: NOT DETECTED
SOURCE AMPLIFICATION: NORMAL
SOURCE AMPLIFICATION: NORMAL
T VAGINALIS RRNA SPEC QL NAA+PROBE: NOT DETECTED

## 2021-01-29 LAB — CYTOLOGY CVX/VAG DOC THIN PREP: ABNORMAL

## 2021-02-08 ENCOUNTER — EMERGENCY (EMERGENCY)
Facility: HOSPITAL | Age: 56
LOS: 0 days | Discharge: HOME | End: 2021-02-08
Attending: EMERGENCY MEDICINE | Admitting: EMERGENCY MEDICINE
Payer: COMMERCIAL

## 2021-02-08 ENCOUNTER — RESULT REVIEW (OUTPATIENT)
Age: 56
End: 2021-02-08

## 2021-02-08 ENCOUNTER — TRANSCRIPTION ENCOUNTER (OUTPATIENT)
Age: 56
End: 2021-02-08

## 2021-02-08 VITALS
HEIGHT: 64 IN | WEIGHT: 203.93 LBS | DIASTOLIC BLOOD PRESSURE: 95 MMHG | TEMPERATURE: 98 F | HEART RATE: 77 BPM | SYSTOLIC BLOOD PRESSURE: 149 MMHG | OXYGEN SATURATION: 100 % | RESPIRATION RATE: 18 BRPM

## 2021-02-08 DIAGNOSIS — R06.02 SHORTNESS OF BREATH: ICD-10-CM

## 2021-02-08 DIAGNOSIS — Z79.01 LONG TERM (CURRENT) USE OF ANTICOAGULANTS: ICD-10-CM

## 2021-02-08 DIAGNOSIS — I10 ESSENTIAL (PRIMARY) HYPERTENSION: ICD-10-CM

## 2021-02-08 DIAGNOSIS — Z88.8 ALLERGY STATUS TO OTHER DRUGS, MEDICAMENTS AND BIOLOGICAL SUBSTANCES: ICD-10-CM

## 2021-02-08 DIAGNOSIS — Z91.041 RADIOGRAPHIC DYE ALLERGY STATUS: ICD-10-CM

## 2021-02-08 DIAGNOSIS — E11.9 TYPE 2 DIABETES MELLITUS WITHOUT COMPLICATIONS: ICD-10-CM

## 2021-02-08 DIAGNOSIS — Z90.49 ACQUIRED ABSENCE OF OTHER SPECIFIED PARTS OF DIGESTIVE TRACT: Chronic | ICD-10-CM

## 2021-02-08 DIAGNOSIS — Z88.2 ALLERGY STATUS TO SULFONAMIDES: ICD-10-CM

## 2021-02-08 DIAGNOSIS — C61 MALIGNANT NEOPLASM OF PROSTATE: Chronic | ICD-10-CM

## 2021-02-08 DIAGNOSIS — Z91.040 LATEX ALLERGY STATUS: ICD-10-CM

## 2021-02-08 DIAGNOSIS — Z88.0 ALLERGY STATUS TO PENICILLIN: ICD-10-CM

## 2021-02-08 DIAGNOSIS — Z98.890 OTHER SPECIFIED POSTPROCEDURAL STATES: Chronic | ICD-10-CM

## 2021-02-08 DIAGNOSIS — Z79.84 LONG TERM (CURRENT) USE OF ORAL HYPOGLYCEMIC DRUGS: ICD-10-CM

## 2021-02-08 DIAGNOSIS — Z91.013 ALLERGY TO SEAFOOD: ICD-10-CM

## 2021-02-08 DIAGNOSIS — Z90.710 ACQUIRED ABSENCE OF BOTH CERVIX AND UTERUS: Chronic | ICD-10-CM

## 2021-02-08 DIAGNOSIS — Z98.84 BARIATRIC SURGERY STATUS: ICD-10-CM

## 2021-02-08 LAB
ALBUMIN SERPL ELPH-MCNC: 4.3 G/DL — SIGNIFICANT CHANGE UP (ref 3.5–5.2)
ALP SERPL-CCNC: 74 U/L — SIGNIFICANT CHANGE UP (ref 30–115)
ALT FLD-CCNC: 25 U/L — SIGNIFICANT CHANGE UP (ref 0–41)
ANION GAP SERPL CALC-SCNC: 11 MMOL/L — SIGNIFICANT CHANGE UP (ref 7–14)
AST SERPL-CCNC: 25 U/L — SIGNIFICANT CHANGE UP (ref 0–41)
BASOPHILS # BLD AUTO: 0.05 K/UL — SIGNIFICANT CHANGE UP (ref 0–0.2)
BASOPHILS NFR BLD AUTO: 0.6 % — SIGNIFICANT CHANGE UP (ref 0–1)
BILIRUB SERPL-MCNC: 0.2 MG/DL — SIGNIFICANT CHANGE UP (ref 0.2–1.2)
BUN SERPL-MCNC: 14 MG/DL — SIGNIFICANT CHANGE UP (ref 10–20)
CALCIUM SERPL-MCNC: 9.9 MG/DL — SIGNIFICANT CHANGE UP (ref 8.5–10.1)
CHLORIDE SERPL-SCNC: 102 MMOL/L — SIGNIFICANT CHANGE UP (ref 98–110)
CO2 SERPL-SCNC: 26 MMOL/L — SIGNIFICANT CHANGE UP (ref 17–32)
CREAT SERPL-MCNC: 0.8 MG/DL — SIGNIFICANT CHANGE UP (ref 0.7–1.5)
D DIMER BLD IA.RAPID-MCNC: 94 NG/ML DDU — SIGNIFICANT CHANGE UP (ref 0–230)
EOSINOPHIL # BLD AUTO: 0.16 K/UL — SIGNIFICANT CHANGE UP (ref 0–0.7)
EOSINOPHIL NFR BLD AUTO: 2 % — SIGNIFICANT CHANGE UP (ref 0–8)
GLUCOSE SERPL-MCNC: 264 MG/DL — HIGH (ref 70–99)
HCG SERPL QL: NEGATIVE — SIGNIFICANT CHANGE UP
HCT VFR BLD CALC: 39.8 % — SIGNIFICANT CHANGE UP (ref 37–47)
HGB BLD-MCNC: 13.5 G/DL — SIGNIFICANT CHANGE UP (ref 12–16)
IMM GRANULOCYTES NFR BLD AUTO: 0.2 % — SIGNIFICANT CHANGE UP (ref 0.1–0.3)
LIDOCAIN IGE QN: 32 U/L — SIGNIFICANT CHANGE UP (ref 7–60)
LYMPHOCYTES # BLD AUTO: 2.64 K/UL — SIGNIFICANT CHANGE UP (ref 1.2–3.4)
LYMPHOCYTES # BLD AUTO: 32.8 % — SIGNIFICANT CHANGE UP (ref 20.5–51.1)
MAGNESIUM SERPL-MCNC: 1.9 MG/DL — SIGNIFICANT CHANGE UP (ref 1.8–2.4)
MCHC RBC-ENTMCNC: 27.8 PG — SIGNIFICANT CHANGE UP (ref 27–31)
MCHC RBC-ENTMCNC: 33.9 G/DL — SIGNIFICANT CHANGE UP (ref 32–37)
MCV RBC AUTO: 82.1 FL — SIGNIFICANT CHANGE UP (ref 81–99)
MONOCYTES # BLD AUTO: 0.46 K/UL — SIGNIFICANT CHANGE UP (ref 0.1–0.6)
MONOCYTES NFR BLD AUTO: 5.7 % — SIGNIFICANT CHANGE UP (ref 1.7–9.3)
NEUTROPHILS # BLD AUTO: 4.72 K/UL — SIGNIFICANT CHANGE UP (ref 1.4–6.5)
NEUTROPHILS NFR BLD AUTO: 58.7 % — SIGNIFICANT CHANGE UP (ref 42.2–75.2)
NRBC # BLD: 0 /100 WBCS — SIGNIFICANT CHANGE UP (ref 0–0)
NT-PROBNP SERPL-SCNC: 22 PG/ML — SIGNIFICANT CHANGE UP (ref 0–300)
PLATELET # BLD AUTO: 247 K/UL — SIGNIFICANT CHANGE UP (ref 130–400)
POTASSIUM SERPL-MCNC: 4.3 MMOL/L — SIGNIFICANT CHANGE UP (ref 3.5–5)
POTASSIUM SERPL-SCNC: 4.3 MMOL/L — SIGNIFICANT CHANGE UP (ref 3.5–5)
PROT SERPL-MCNC: 6.9 G/DL — SIGNIFICANT CHANGE UP (ref 6–8)
RBC # BLD: 4.85 M/UL — SIGNIFICANT CHANGE UP (ref 4.2–5.4)
RBC # FLD: 12.7 % — SIGNIFICANT CHANGE UP (ref 11.5–14.5)
SODIUM SERPL-SCNC: 139 MMOL/L — SIGNIFICANT CHANGE UP (ref 135–146)
TROPONIN T SERPL-MCNC: <0.01 NG/ML — SIGNIFICANT CHANGE UP
TROPONIN T SERPL-MCNC: <0.01 NG/ML — SIGNIFICANT CHANGE UP
WBC # BLD: 8.05 K/UL — SIGNIFICANT CHANGE UP (ref 4.8–10.8)
WBC # FLD AUTO: 8.05 K/UL — SIGNIFICANT CHANGE UP (ref 4.8–10.8)

## 2021-02-08 PROCEDURE — 71045 X-RAY EXAM CHEST 1 VIEW: CPT | Mod: 26

## 2021-02-08 PROCEDURE — 99285 EMERGENCY DEPT VISIT HI MDM: CPT

## 2021-02-08 PROCEDURE — 93010 ELECTROCARDIOGRAM REPORT: CPT

## 2021-02-08 NOTE — ED PROVIDER NOTE - CLINICAL SUMMARY MEDICAL DECISION MAKING FREE TEXT BOX
ekg/trop neg x2, ddimer neg, dr ovalle recommended 2 set trop and if neg can f/u as outpt give recent neg stress. pt feels better, dc home, strict return precautions provided.

## 2021-02-08 NOTE — ED PROVIDER NOTE - ATTENDING CONTRIBUTION TO CARE
55F PMH DM, HTN, gastric sleeve 6 years ago, p/w 4 days of SOB and pleuritic CP ever since shovelling snow. Seen by Dr Navarro today and sent to ED to r/o PE and r/o ACS. states had normal treadmill stress 2 weeks ago. No active cp. No fever, cough. No tearing back pain. No le edema, le pain, immobilization, hormones, hemoptysis. No abd pain, nvdc. No palp, diaphoresis.     on exam, AFVSS, well maisha nad, ncat, eomi, perrla, mmm, lctab, rrr nl s1s2 no mrg, abd soft ntnd, aaox3, no focal deficits, no le edema or calf ttp,     a/p; SOB/cp after shovelling snow. Will do labs, ekg/trop, ddimer, CXR, tele asa consult Dr Casas.

## 2021-02-08 NOTE — ED ADULT NURSE NOTE - OBJECTIVE STATEMENT
pt presents to the ed with c/o worsening shortness of breath worsening when taking deep breathes  x 4 days. pt denies chest pain.

## 2021-02-08 NOTE — ED PROVIDER NOTE - OBJECTIVE STATEMENT
55y F pmh DM, HTN, gastric sleeve 6 years ago presenting with sob. At rest and with exertion. No leg swelling. No hx of heart failure. No chest pain. No f/c/n/v. No loss of taste/smell. Tested negative for covid today. Nonsmoker. No hx of dvt/pe. Hx of uterine cancer in 1999 now in remission. No recent immobilization. Asked to come in by dr. ovalle for PE/ACS r/o.

## 2021-02-08 NOTE — ED ADULT TRIAGE NOTE - CHIEF COMPLAINT QUOTE
pt sent by PMD to rule out PE. pt  c/o worsening shortness of breath worsening when taking deep breathes  x 4 days. pt denies chest pain.

## 2021-02-08 NOTE — ED PROVIDER NOTE - NSFOLLOWUPINSTRUCTIONS_ED_ALL_ED_FT
multifactorial acute on chronic systolic /diastolic  heart failure    COPD ,  sepsis     Continue IV lasix , bronchodilator treatment ,  beta blockers, ecotrin plavix  , echocardiogram to asses left ventricular function

## 2021-02-08 NOTE — ED ADULT NURSE REASSESSMENT NOTE - NS ED NURSE REASSESS COMMENT FT1
Received pt from previous RN. PT had bed alarm present and audible. Aware of plan of care. No c/o at this time

## 2021-02-08 NOTE — ED PROVIDER NOTE - NS ED ROS FT
Eyes:  No visual changes, eye pain or discharge.  ENMT:  No hearing changes, pain, no sore throat or runny nose, no difficulty swallowing  Cardiac:  No chest pain, edema. No chest pain with exertion.  Respiratory:  see HPI  GI:No vomiting, diarrhea or abdominal pain.  :  No dysuria, frequency or burning.  MS:  No myalgia, muscle weakness, joint pain or back pain.  Neuro:  No headache or weakness.  No LOC.  Skin:  No skin rash.   Endocrine: No history of thyroid disease or diabetes.

## 2021-02-08 NOTE — ED PROVIDER NOTE - PATIENT PORTAL LINK FT
You can access the FollowMyHealth Patient Portal offered by [facility] by registering at the following website: [email or web address]. By joining [employer]’s FollowMyHealth portal, you will also be able to view your health information using other applications (apps) compatible with our system.

## 2021-02-08 NOTE — ED ADULT NURSE NOTE - INTERVENTIONS DEFINITIONS
[place] to call system/Call bell, personal items and telephone within reach/Instruct patient to call for assistance/Room bathroom lighting operational/Non-slip footwear when patient is off stretcher/Physically safe environment: no spills, clutter or unnecessary equipment/Stretcher in lowest position, wheels locked, appropriate side rails in place/Provide visual cue, wrist band, yellow gown, etc./Monitor gait and stability/Monitor for mental status changes and reorient to person, place, and time/Review medications for side effects contributing to fall risk/Reinforce activity limits and safety measures with patient and family

## 2021-02-08 NOTE — ED ADULT NURSE REASSESSMENT NOTE - NS ED NURSE REASSESS COMMENT FT1
unable to document in ED flowsheet due to sunrise issues. patient has a R AC 18g IV catheter- flushes and blood return present. labs sent as per MD order. urine sent as per MD order. patient placed on CM. questions and concerns addressed. MA in place

## 2021-02-08 NOTE — ED PROVIDER NOTE - PROGRESS NOTE DETAILS
Dr. Casas updated that pt's dimer is negative. Augusto saying pt can be d/valencia with o/p follow up if trop x2 is negative. -kaya

## 2021-02-11 ENCOUNTER — RESULT REVIEW (OUTPATIENT)
Age: 56
End: 2021-02-11

## 2021-02-11 ENCOUNTER — OUTPATIENT (OUTPATIENT)
Dept: OUTPATIENT SERVICES | Facility: HOSPITAL | Age: 56
LOS: 1 days | Discharge: HOME | End: 2021-02-11
Payer: COMMERCIAL

## 2021-02-11 DIAGNOSIS — Z90.710 ACQUIRED ABSENCE OF BOTH CERVIX AND UTERUS: Chronic | ICD-10-CM

## 2021-02-11 DIAGNOSIS — Z98.890 OTHER SPECIFIED POSTPROCEDURAL STATES: Chronic | ICD-10-CM

## 2021-02-11 DIAGNOSIS — R92.8 OTHER ABNORMAL AND INCONCLUSIVE FINDINGS ON DIAGNOSTIC IMAGING OF BREAST: ICD-10-CM

## 2021-02-11 DIAGNOSIS — Z90.49 ACQUIRED ABSENCE OF OTHER SPECIFIED PARTS OF DIGESTIVE TRACT: Chronic | ICD-10-CM

## 2021-02-11 DIAGNOSIS — C61 MALIGNANT NEOPLASM OF PROSTATE: Chronic | ICD-10-CM

## 2021-02-11 PROCEDURE — 77066 DX MAMMO INCL CAD BI: CPT | Mod: 26

## 2021-02-11 PROCEDURE — G0279: CPT | Mod: 26

## 2021-02-12 DIAGNOSIS — N95.9 UNSPECIFIED MENOPAUSAL AND PERIMENOPAUSAL DISORDER: ICD-10-CM

## 2021-02-12 DIAGNOSIS — Z13.820 ENCOUNTER FOR SCREENING FOR OSTEOPOROSIS: ICD-10-CM

## 2021-02-12 DIAGNOSIS — Z87.310 PERSONAL HISTORY OF (HEALED) OSTEOPOROSIS FRACTURE: ICD-10-CM

## 2021-02-21 PROBLEM — Z01.419 ENCOUNTER FOR ANNUAL ROUTINE GYNECOLOGICAL EXAMINATION: Status: RESOLVED | Noted: 2021-02-21 | Resolved: 2021-03-07

## 2021-02-21 NOTE — HISTORY OF PRESENT ILLNESS
[Menarche Age: ____] : age at menarche was [unfilled] [Post-Menopause, No Sxs] : post-menopausal, currently without symptoms [Menopause Age: ____] : age at menopause was [unfilled] [Currently Active] : currently active [Women] : women [Patient reported mammogram was abnormal] : Patient reported mammogram was abnormal [Gonorrhea test offered] : Gonorrhea test offered [Chlamydia test offered] : Chlamydia test offered [Trichomonas test offered] : Trichomonas test offered [HPV test offered] : HPV test offered [postmenopausal] : postmenopausal [N] : Patient denies prior pregnancies [No] : No [FreeTextEntry1] : Pt is here for an initial exam due to Hx of uterine CA in 1999, pt has no complaints today.  [TextBox_4] : 54yo G0 in menopause came for annual GYN exam and pap smear.  She has been in surgical menopause since 30 yoa after DO/BSO for endometrial cancer.  She denies PMB, pelvic pain, discharge, dysuria or other GYN complaints.  She denies h/o abnl pap/HPV or STDs.  She is sexually active with one partner- no condoms [Mammogramdate] : 7/2020 [TextBox_19] : birads 3- requested diagnostic 6 month f/u- will give referral [TextBox_37] : will give referral for premature menopause [PGHxTotal] : 0

## 2021-02-21 NOTE — DISCUSSION/SUMMARY
[FreeTextEntry1] : TVS done to assess for any recurrent pathology due to difficult exam, there uterus/ovaries are absent, no free fluid, no abnormal masses noted, cuff intact.\par \par A: 54yo for annual GYN exam, menopause, HTN, DM, BMI 36\par \par P: GYN exam done\par     pap smear done\par     TVS done\par     safe sex practices\par     pain and bleeding precautions\par     referral for diagnostic mammo/sono\par     referral for DEXA due to premature menopause\par     encourage healthy diet/exercise\par     f/u with PCP for other comorbidities\par     f/u 1 yr or PRN\par

## 2021-02-21 NOTE — COUNSELING
[Nutrition/ Exercise/ Weight Management] : nutrition, exercise, weight management [Vitamins/Supplements] : vitamins/supplements [Breast Self Exam] : breast self exam [Bladder Hygiene] : bladder hygiene [STD (testing, results, tx)] : STD (testing, results, tx) [Medication Management] : medication management

## 2021-02-21 NOTE — PHYSICAL EXAM
[Appropriately responsive] : appropriately responsive [Alert] : alert [No Acute Distress] : no acute distress [Regular Rate Rhythm] : regular rate rhythm [No Lymphadenopathy] : no lymphadenopathy [Soft] : soft [Non-tender] : non-tender [Non-distended] : non-distended [No Mass] : no mass [Oriented x3] : oriented x3 [Normal] : normal [Examination Of The Breasts] : a normal appearance [No Discharge] : no discharge [No Masses] : no breast masses were palpable [Vulvar Atrophy] : vulvar atrophy [Labia Majora] : normal [Labia Minora] : normal [Atrophy] : atrophy [No Bleeding] : There was no active vaginal bleeding [Absent] : absent [FreeTextEntry4] : pap of vaginal cuff done [FreeTextEntry6] : exam difficult due to habitus and abdominoplasty

## 2021-02-24 ENCOUNTER — OUTPATIENT (OUTPATIENT)
Dept: OUTPATIENT SERVICES | Facility: HOSPITAL | Age: 56
LOS: 1 days | Discharge: HOME | End: 2021-02-24
Payer: COMMERCIAL

## 2021-02-24 ENCOUNTER — RESULT REVIEW (OUTPATIENT)
Age: 56
End: 2021-02-24

## 2021-02-24 DIAGNOSIS — R06.00 DYSPNEA, UNSPECIFIED: ICD-10-CM

## 2021-02-24 DIAGNOSIS — Z98.890 OTHER SPECIFIED POSTPROCEDURAL STATES: Chronic | ICD-10-CM

## 2021-02-24 DIAGNOSIS — C61 MALIGNANT NEOPLASM OF PROSTATE: Chronic | ICD-10-CM

## 2021-02-24 DIAGNOSIS — Z90.710 ACQUIRED ABSENCE OF BOTH CERVIX AND UTERUS: Chronic | ICD-10-CM

## 2021-02-24 DIAGNOSIS — Z90.49 ACQUIRED ABSENCE OF OTHER SPECIFIED PARTS OF DIGESTIVE TRACT: Chronic | ICD-10-CM

## 2021-02-24 PROCEDURE — 71250 CT THORAX DX C-: CPT | Mod: 26

## 2021-02-24 PROCEDURE — 93970 EXTREMITY STUDY: CPT | Mod: 26

## 2021-02-25 ENCOUNTER — APPOINTMENT (OUTPATIENT)
Dept: OBGYN | Facility: CLINIC | Age: 56
End: 2021-02-25
Payer: COMMERCIAL

## 2021-02-25 PROCEDURE — 99442: CPT

## 2021-02-26 ENCOUNTER — TRANSCRIPTION ENCOUNTER (OUTPATIENT)
Age: 56
End: 2021-02-26

## 2021-03-06 DIAGNOSIS — Z82.5 FAMILY HISTORY OF ASTHMA AND OTHER CHRONIC LOWER RESPIRATORY DISEASES: ICD-10-CM

## 2021-03-06 RX ORDER — BLOOD SUGAR DIAGNOSTIC
STRIP MISCELLANEOUS
Refills: 0 | Status: ACTIVE | COMMUNITY

## 2021-03-06 RX ORDER — NEBIVOLOL HYDROCHLORIDE 20 MG/1
TABLET ORAL
Refills: 0 | Status: ACTIVE | COMMUNITY

## 2021-03-06 RX ORDER — LOSARTAN POTASSIUM 100 MG/1
TABLET, FILM COATED ORAL
Refills: 0 | Status: ACTIVE | COMMUNITY

## 2021-03-06 RX ORDER — INSULIN ASPART 100 [IU]/ML
100 INJECTION, SOLUTION INTRAVENOUS; SUBCUTANEOUS
Refills: 0 | Status: ACTIVE | COMMUNITY

## 2021-03-06 RX ORDER — ALBUTEROL SULFATE 90 UG/1
108 (90 BASE) INHALANT RESPIRATORY (INHALATION)
Refills: 0 | Status: ACTIVE | COMMUNITY

## 2021-03-07 ENCOUNTER — EMERGENCY (EMERGENCY)
Facility: HOSPITAL | Age: 56
LOS: 0 days | Discharge: HOME | End: 2021-03-08
Attending: EMERGENCY MEDICINE | Admitting: EMERGENCY MEDICINE
Payer: COMMERCIAL

## 2021-03-07 VITALS
DIASTOLIC BLOOD PRESSURE: 74 MMHG | HEART RATE: 84 BPM | TEMPERATURE: 98 F | WEIGHT: 205.03 LBS | HEIGHT: 64 IN | RESPIRATION RATE: 17 BRPM | SYSTOLIC BLOOD PRESSURE: 142 MMHG | OXYGEN SATURATION: 100 %

## 2021-03-07 DIAGNOSIS — Z91.041 RADIOGRAPHIC DYE ALLERGY STATUS: ICD-10-CM

## 2021-03-07 DIAGNOSIS — Z98.890 OTHER SPECIFIED POSTPROCEDURAL STATES: Chronic | ICD-10-CM

## 2021-03-07 DIAGNOSIS — Z79.01 LONG TERM (CURRENT) USE OF ANTICOAGULANTS: ICD-10-CM

## 2021-03-07 DIAGNOSIS — Z79.84 LONG TERM (CURRENT) USE OF ORAL HYPOGLYCEMIC DRUGS: ICD-10-CM

## 2021-03-07 DIAGNOSIS — Z91.013 ALLERGY TO SEAFOOD: ICD-10-CM

## 2021-03-07 DIAGNOSIS — C61 MALIGNANT NEOPLASM OF PROSTATE: Chronic | ICD-10-CM

## 2021-03-07 DIAGNOSIS — E11.9 TYPE 2 DIABETES MELLITUS WITHOUT COMPLICATIONS: ICD-10-CM

## 2021-03-07 DIAGNOSIS — Z88.0 ALLERGY STATUS TO PENICILLIN: ICD-10-CM

## 2021-03-07 DIAGNOSIS — R06.02 SHORTNESS OF BREATH: ICD-10-CM

## 2021-03-07 DIAGNOSIS — Z88.2 ALLERGY STATUS TO SULFONAMIDES: ICD-10-CM

## 2021-03-07 DIAGNOSIS — Z88.8 ALLERGY STATUS TO OTHER DRUGS, MEDICAMENTS AND BIOLOGICAL SUBSTANCES STATUS: ICD-10-CM

## 2021-03-07 DIAGNOSIS — Z90.710 ACQUIRED ABSENCE OF BOTH CERVIX AND UTERUS: Chronic | ICD-10-CM

## 2021-03-07 DIAGNOSIS — Z98.890 OTHER SPECIFIED POSTPROCEDURAL STATES: ICD-10-CM

## 2021-03-07 DIAGNOSIS — I10 ESSENTIAL (PRIMARY) HYPERTENSION: ICD-10-CM

## 2021-03-07 DIAGNOSIS — Z79.899 OTHER LONG TERM (CURRENT) DRUG THERAPY: ICD-10-CM

## 2021-03-07 DIAGNOSIS — Z90.49 ACQUIRED ABSENCE OF OTHER SPECIFIED PARTS OF DIGESTIVE TRACT: Chronic | ICD-10-CM

## 2021-03-07 PROCEDURE — 93010 ELECTROCARDIOGRAM REPORT: CPT

## 2021-03-07 PROCEDURE — 99284 EMERGENCY DEPT VISIT MOD MDM: CPT

## 2021-03-07 NOTE — ED ADULT TRIAGE NOTE - CHIEF COMPLAINT QUOTE
pt with difficulty breathing  states has been following with pulmonologist for SOB,  recently had CT chest done but did not receive the results yet  came to ED today for worsening SOB & difficulty breathing

## 2021-03-08 PROCEDURE — 71045 X-RAY EXAM CHEST 1 VIEW: CPT | Mod: 26

## 2021-03-08 NOTE — ED PROVIDER NOTE - ATTENDING CONTRIBUTION TO CARE
54 y/o female with hx of NIDDM, Uterine Cancer s/p DO, s/p Gastric Sleeve presents to ED with brief episodes of dyspnea at the apex of inspiration.  No CP.  No exercise intolerance.  Is concerned because had CT Chest a few days ago but doesn't know the results.  Presented to ED for similar complaints 1 month ago and was seen by Dr. Casas and had normal echo and stress test.  No fever or chills.  No numbness, tingling or weakness.  No BRBPR or melena.  PE;  agree with above.  A/P:  Dyspnea, resolved in ED.  CXR and reassess.

## 2021-03-08 NOTE — ED PROVIDER NOTE - PATIENT PORTAL LINK FT
You can access the FollowMyHealth Patient Portal offered by [facility] by registering at the following website: [email or web address]. By joining [employer]’s FollowMyHealth portal, you will also be able to view your health information using other applications (apps) compatible with our system.

## 2021-03-08 NOTE — ED PROVIDER NOTE - PROGRESS NOTE DETAILS
rev'd with pt results of CT, given copies. will obtain CXR for any change and pt agrees if unremarkable, will go home to f/u with specialists as sched. Pt counseled - warning si/sxs d/w pt. Pt instructed to return to ed or f/u with PCP should sxs persist/worsen. Pt verbalizes an understanding & agreement with the plan as discussed

## 2021-03-08 NOTE — ED PROVIDER NOTE - NSFOLLOWUPCLINICS_GEN_ALL_ED_FT
A Cardiologist  Cardiology  .  [place]   Phone:   Fax:   Follow Up Time:     A Family Medicine Doctor  Family Medicine  .  [place]   Phone:   Fax:   Follow Up Time:     A Gastroenterologist  Gastroenterology  .  [place]   Phone:   Fax:   Follow Up Time:     A Pulmonologist  Pulmonary Medicine  .  [place]   Phone:   Fax:   Follow Up Time:

## 2021-03-08 NOTE — ED PROVIDER NOTE - PHYSICAL EXAMINATION
CONSTITUTIONAL: Well-appearing; well-nourished; in no apparent distress.   CARDIOVASCULAR: Normal S1, S2; no murmurs, rubs, or gallops.   RESPIRATORY: Normal chest excursion with respiration; breath sounds clear and equal bilaterally; no wheezes, rhonchi, or rales.  GI/: non-distended; non-tender; no palpable organomegaly.   SKIN: Normal for age and race; warm; dry; good turgor; no apparent lesions or exudate.   NEURO/PSYCH: A & O x 4; grossly unremarkable. mood and manner are appropriate

## 2021-03-08 NOTE — ED PROVIDER NOTE - NSFOLLOWUPINSTRUCTIONS_ED_ALL_ED_FT
Shortness of Breath    Shortness of breath means you have trouble breathing. It could also mean that you have a medical problem. You should get immediate medical care for shortness of breath.     CAUSES  Not enough oxygen in the air such as with high altitudes or a smoke-filled room.  Certain lung diseases, infections, or problems.  Heart disease or conditions, such as angina or heart failure.   Low red blood cells (anemia).  Poor physical fitness, which can cause shortness of breath when you exercise.  Chest or back injuries or stiffness.  Being overweight.  Smoking.   Anxiety, which can make you feel like you are not getting enough air.    DIAGNOSIS  Serious medical problems can often be found during your physical exam. Tests may also be done to determine why you are having shortness of breath. Tests may include:    Chest X-rays.   Lung function tests.   Blood tests.  An electrocardiogram (ECG).  An ambulatory electrocardiogram. An ambulatory ECG records your heartbeat patterns over a 24-hour period.   Exercise testing.  A transthoracic echocardiogram (TTE). During echocardiography, sound waves are used to evaluate how blood flows through your heart.   A transesophageal echocardiogram (RADHA).   Imaging scans.     Your health care provider may not be able to find a cause for your shortness of breath after your exam. In this case, it is important to have a follow-up exam with your health care provider as directed.     TREATMENT  Treatment for shortness of breath depends on the cause of your symptoms and can vary greatly.    HOME CARE INSTRUCTIONS  Do not smoke. Smoking is a common cause of shortness of breath. If you smoke, ask for help to quit.  Avoid being around chemicals or things that may bother your breathing, such as paint fumes and dust.  Rest as needed. Slowly resume your usual activities.  If medicines were prescribed, take them as directed for the full length of time directed. This includes oxygen and any inhaled medicines.  Keep all follow-up appointments as directed by your health care provider.    SEEK MEDICAL CARE IF:  Your condition does not improve in the time expected.  You have a hard time doing your normal activities even with rest.  You have any new symptoms.    SEEK IMMEDIATE MEDICAL CARE IF:  Your shortness of breath gets worse.  You feel light-headed, faint, or develop a cough not controlled with medicines.  You start coughing up blood.   You have pain with breathing.  You have chest pain or pain in your arms, shoulders, or abdomen.  You have a fever.  You are unable to walk up stairs or exercise the way you normally do.     MAKE SURE YOU:  Understand these instructions.  Will watch your condition.  Will get help right away if you are not doing well or get worse.    ADDITIONAL NOTES AND INSTRUCTIONS    Please follow up with your Primary MD in 24-48 hr.  Seek immediate medical care for any new/worsening signs or symptoms.

## 2021-03-08 NOTE — ED PROVIDER NOTE - OBJECTIVE STATEMENT
pt presents to ED again for SOB which she described as episodes of gasping for air. she had ED eval 1 mo ago for same, neg. saw , had nl stress and echo. sent for CT chest recently, but does not have results. has f/u with pulm and GI tomorrow, but concerned and came to ED. 1 episode of vomiting earlier after feeling palpitations, took "half a xanax" and now resolved. Denies fever/chill/HA/dizziness/palpitation/abd pain/n/v/d/ black stool/bloody stool/urinary sxs

## 2021-03-08 NOTE — ED PROVIDER NOTE - CLINICAL SUMMARY MEDICAL DECISION MAKING FREE TEXT BOX
Patient remains asymptomatic in ED.  CXR negative.  CT Chest from last week reviewed and no focal findings.  VSS, no hypoxia, no signs of sepsis.  D/C home.  Has f/u in 12-24 hours with both cardiology and gastroenterology.  Strict return instructions discussed.

## 2021-03-19 ENCOUNTER — APPOINTMENT (OUTPATIENT)
Dept: PULMONOLOGY | Facility: CLINIC | Age: 56
End: 2021-03-19
Payer: COMMERCIAL

## 2021-03-19 VITALS
SYSTOLIC BLOOD PRESSURE: 120 MMHG | WEIGHT: 205 LBS | HEIGHT: 64 IN | BODY MASS INDEX: 35 KG/M2 | HEART RATE: 80 BPM | DIASTOLIC BLOOD PRESSURE: 72 MMHG | OXYGEN SATURATION: 97 % | RESPIRATION RATE: 14 BRPM

## 2021-03-19 PROCEDURE — 99213 OFFICE O/P EST LOW 20 MIN: CPT

## 2021-03-19 PROCEDURE — 99072 ADDL SUPL MATRL&STAF TM PHE: CPT

## 2021-03-29 NOTE — ASU PATIENT PROFILE, ADULT - TEACHING/LEARNING FACTORS INFLUENCE READINESS TO LEARN
[Normal] : normal rate, regular rhythm, normal S1 and S2 and no murmur heard [de-identified] : Outer ear w/ dryness, scaly none

## 2021-04-02 ENCOUNTER — TRANSCRIPTION ENCOUNTER (OUTPATIENT)
Age: 56
End: 2021-04-02

## 2021-04-20 ENCOUNTER — RESULT REVIEW (OUTPATIENT)
Age: 56
End: 2021-04-20

## 2021-04-20 ENCOUNTER — OUTPATIENT (OUTPATIENT)
Dept: OUTPATIENT SERVICES | Facility: HOSPITAL | Age: 56
LOS: 1 days | Discharge: HOME | End: 2021-04-20
Payer: COMMERCIAL

## 2021-04-20 DIAGNOSIS — N20.1 CALCULUS OF URETER: ICD-10-CM

## 2021-04-20 DIAGNOSIS — Z90.49 ACQUIRED ABSENCE OF OTHER SPECIFIED PARTS OF DIGESTIVE TRACT: Chronic | ICD-10-CM

## 2021-04-20 DIAGNOSIS — Z90.710 ACQUIRED ABSENCE OF BOTH CERVIX AND UTERUS: Chronic | ICD-10-CM

## 2021-04-20 DIAGNOSIS — Z98.890 OTHER SPECIFIED POSTPROCEDURAL STATES: Chronic | ICD-10-CM

## 2021-04-20 DIAGNOSIS — C61 MALIGNANT NEOPLASM OF PROSTATE: Chronic | ICD-10-CM

## 2021-04-20 PROCEDURE — 76775 US EXAM ABDO BACK WALL LIM: CPT | Mod: 26

## 2021-04-21 ENCOUNTER — APPOINTMENT (OUTPATIENT)
Dept: GASTROENTEROLOGY | Facility: CLINIC | Age: 56
End: 2021-04-21

## 2021-04-29 ENCOUNTER — APPOINTMENT (OUTPATIENT)
Dept: THORACIC SURGERY | Facility: CLINIC | Age: 56
End: 2021-04-29

## 2021-05-06 ENCOUNTER — APPOINTMENT (OUTPATIENT)
Dept: UROLOGY | Facility: CLINIC | Age: 56
End: 2021-05-06
Payer: COMMERCIAL

## 2021-05-06 VITALS
HEART RATE: 90 BPM | SYSTOLIC BLOOD PRESSURE: 154 MMHG | TEMPERATURE: 97.3 F | WEIGHT: 213 LBS | DIASTOLIC BLOOD PRESSURE: 83 MMHG | HEIGHT: 64 IN | BODY MASS INDEX: 36.37 KG/M2

## 2021-05-06 PROCEDURE — 99072 ADDL SUPL MATRL&STAF TM PHE: CPT

## 2021-05-06 PROCEDURE — 99214 OFFICE O/P EST MOD 30 MIN: CPT

## 2021-05-06 NOTE — LETTER HEADER
[FreeTextEntry3] : Hayley Lr M.D.\par Director of Urology\par [facility]\par [address] [place] [address], [address]\[place] [address]

## 2021-05-06 NOTE — ASSESSMENT
[FreeTextEntry1] : Okay urologically she is stable I understand the importance of not being able to breathe over preventing future stones but they are not mutually exclusive.  She will try again to get into her nephrologist and we will give her the list of the ones that we use a letter recommend that she repeat the metabolic work-up and that she see the nutritionist.  From my aspect I will see her again in 3 months of her problems develop she will call us.  If she cannot get into the nephrologist or the nutritionist I have asked her to let me know as we may have to order some more tests if they are not involved or ordering them from there.\par \par

## 2021-05-06 NOTE — LETTER BODY
[Dear  ___] : Dear  [unfilled], [Courtesy Letter:] : I had the pleasure of seeing your patient, [unfilled], in my office today. [Please see my note below.] : Please see my note below. [Sincerely,] : Sincerely, [FreeTextEntry2] : Pura Guerrier MD\par [address] [facility]\[place] [address]\par

## 2021-05-06 NOTE — PHYSICAL EXAM
[General Appearance - Well Developed] : well developed [General Appearance - Well Nourished] : well nourished [Normal Appearance] : normal appearance [Well Groomed] : well groomed [General Appearance - In No Acute Distress] : no acute distress [FreeTextEntry1] : obese [Abdomen Soft] : soft [Abdomen Tenderness] : non-tender [Costovertebral Angle Tenderness] : no ~M costovertebral angle tenderness [] : no respiratory distress [Respiration, Rhythm And Depth] : normal respiratory rhythm and effort [Exaggerated Use Of Accessory Muscles For Inspiration] : no accessory muscle use [Oriented To Time, Place, And Person] : oriented to person, place, and time [Affect] : the affect was normal [Mood] : the mood was normal [Not Anxious] : not anxious [Normal Station and Gait] : the gait and station were normal for the patient's age

## 2021-05-06 NOTE — HISTORY OF PRESENT ILLNESS
[FreeTextEntry1] : Mary is a 55-year-old female who underwent slightly complicated left ureteroscopy back in early September 2020 requiring a double-J in the second attempt to get things out.  We saw her in follow-up recommended nutrition and renal consult with eventually a follow-up ultrasound.  She had trouble getting into the nephrologist that she knew, we had given her some alternatives in case she could not get in she lost that list and for some reason could not think of call me to get another copy, did not see the nutritionist either.\par \par \par However she has had really good reasons for this, it seems that she has a hiatal hernia which may or may not be related to her previous bariatric surgery she has been in and out of the hospital with shortness of breath and right now they are going to be doing some specialized upper Bluetooth video to see where the symptoms are coming from and then will have to decide if she needs redo bariatric or cardiothoracic surgeon. [Currently Experiencing ___] :  [unfilled]
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
## 2021-07-29 NOTE — ED PROVIDER NOTE - PMH
DM (diabetes mellitus)    H/O pneumothorax  2012 complications post  gastric sleeve  HTN (hypertension)    Hydronephrosis, left    Obese    Pneumonia  2012 post gastric sleeve  Ureteral stone    Uterine cancer  1999   Otezla Counseling: The side effects of Otezla were discussed with the patient, including but not limited to worsening or new depression, weight loss, diarrhea, nausea, upper respiratory tract infection, and headache. Patient instructed to call the office should any adverse effect occur.  The patient verbalized understanding of the proper use and possible adverse effects of Otezla.  All the patient's questions and concerns were addressed.

## 2021-08-09 ENCOUNTER — APPOINTMENT (OUTPATIENT)
Dept: UROLOGY | Facility: CLINIC | Age: 56
End: 2021-08-09
Payer: COMMERCIAL

## 2021-08-09 VITALS
HEART RATE: 82 BPM | BODY MASS INDEX: 36.37 KG/M2 | HEIGHT: 64 IN | SYSTOLIC BLOOD PRESSURE: 134 MMHG | DIASTOLIC BLOOD PRESSURE: 84 MMHG | WEIGHT: 213 LBS

## 2021-08-09 DIAGNOSIS — N13.6 PYONEPHROSIS: ICD-10-CM

## 2021-08-09 PROCEDURE — 99213 OFFICE O/P EST LOW 20 MIN: CPT

## 2021-08-09 NOTE — LETTER HEADER
[FreeTextEntry3] : Hayley Lr M.D.\par Director of Urology\par [facility]\par [address] [place] [address], [address]\[place] [address]

## 2021-08-09 NOTE — ASSESSMENT
[FreeTextEntry1] : Her metabolic work-up demonstrated decreased fluid intake.  It was better than the last metabolic work-up but not as good as the first.  We discussed ways to increase p.o. fluid.  She will see nephrology and nutrition for further consultation at the end of the month.\par \par We will have her obtain renal ultrasound to follow-up in October, 6 months since her last study.  If her she has no new stones and she is being managed by nephrology/nutrition she can continue with them and see us if she needs us.  If she prefers our management we can but I emphasize that the nephrologist tends to focus on prevention and we tend to focus on treatment

## 2021-08-09 NOTE — LETTER BODY
[Dear  ___] : Dear  [unfilled], [Courtesy Letter:] : I had the pleasure of seeing your patient, [unfilled], in my office today. [Please see my note below.] : Please see my note below. [Sincerely,] : Sincerely, [FreeTextEntry2] : Pura Guerrier MD\par [facility]\[place] [address]\par

## 2021-08-09 NOTE — HISTORY OF PRESENT ILLNESS
[FreeTextEntry1] : Mary is a 55-year-old female, with a history of nephrolithiasis status post left ureteroscopy with laser lithotripsy and stent placement in September 2020, who presents to review her metabolic work-up.\par \par Since seeing us last in May she has scheduled appointments with a nephrologist and a nutritionist, both in mid to end of August.\par \par She reports that she is been trying to follow a stone diet and presents to review her metabolic work-up today.\par  [None] : no symptoms

## 2021-08-13 ENCOUNTER — OUTPATIENT (OUTPATIENT)
Dept: OUTPATIENT SERVICES | Facility: HOSPITAL | Age: 56
LOS: 1 days | Discharge: HOME | End: 2021-08-13
Payer: COMMERCIAL

## 2021-08-13 ENCOUNTER — RESULT REVIEW (OUTPATIENT)
Age: 56
End: 2021-08-13

## 2021-08-13 DIAGNOSIS — Z12.31 ENCOUNTER FOR SCREENING MAMMOGRAM FOR MALIGNANT NEOPLASM OF BREAST: ICD-10-CM

## 2021-08-13 DIAGNOSIS — Z98.890 OTHER SPECIFIED POSTPROCEDURAL STATES: Chronic | ICD-10-CM

## 2021-08-13 DIAGNOSIS — C61 MALIGNANT NEOPLASM OF PROSTATE: Chronic | ICD-10-CM

## 2021-08-13 DIAGNOSIS — Z90.49 ACQUIRED ABSENCE OF OTHER SPECIFIED PARTS OF DIGESTIVE TRACT: Chronic | ICD-10-CM

## 2021-08-13 DIAGNOSIS — Z90.710 ACQUIRED ABSENCE OF BOTH CERVIX AND UTERUS: Chronic | ICD-10-CM

## 2021-08-13 PROCEDURE — G0279: CPT | Mod: 26

## 2021-08-13 PROCEDURE — 77065 DX MAMMO INCL CAD UNI: CPT | Mod: 26,RT

## 2021-08-27 ENCOUNTER — APPOINTMENT (OUTPATIENT)
Dept: OBGYN | Facility: CLINIC | Age: 56
End: 2021-08-27

## 2021-08-27 DIAGNOSIS — N63.10 UNSPECIFIED LUMP IN THE RIGHT BREAST, UNSPECIFIED QUADRANT: ICD-10-CM

## 2021-08-30 PROBLEM — N63.10 BREAST MASS, RIGHT: Status: ACTIVE | Noted: 2021-08-30

## 2021-09-10 ENCOUNTER — APPOINTMENT (OUTPATIENT)
Dept: PULMONOLOGY | Facility: CLINIC | Age: 56
End: 2021-09-10
Payer: COMMERCIAL

## 2021-09-10 VITALS
SYSTOLIC BLOOD PRESSURE: 124 MMHG | WEIGHT: 212 LBS | DIASTOLIC BLOOD PRESSURE: 72 MMHG | RESPIRATION RATE: 14 BRPM | HEIGHT: 64 IN | OXYGEN SATURATION: 98 % | HEART RATE: 88 BPM | BODY MASS INDEX: 36.19 KG/M2

## 2021-09-10 PROCEDURE — 99213 OFFICE O/P EST LOW 20 MIN: CPT

## 2021-09-10 NOTE — HISTORY OF PRESENT ILLNESS
[TextBox_4] : STILL CO SOB, FOLLOWED BY GI IN [facility]\par NON SMOKER\par LAST CHEST CT REVIEWED

## 2021-09-22 ENCOUNTER — OUTPATIENT (OUTPATIENT)
Dept: OUTPATIENT SERVICES | Facility: HOSPITAL | Age: 56
LOS: 1 days | Discharge: HOME | End: 2021-09-22

## 2021-09-22 ENCOUNTER — APPOINTMENT (OUTPATIENT)
Dept: SPEECH THERAPY | Facility: CLINIC | Age: 56
End: 2021-09-22

## 2021-09-22 DIAGNOSIS — Z90.710 ACQUIRED ABSENCE OF BOTH CERVIX AND UTERUS: Chronic | ICD-10-CM

## 2021-09-22 DIAGNOSIS — C61 MALIGNANT NEOPLASM OF PROSTATE: Chronic | ICD-10-CM

## 2021-09-22 DIAGNOSIS — Z90.49 ACQUIRED ABSENCE OF OTHER SPECIFIED PARTS OF DIGESTIVE TRACT: Chronic | ICD-10-CM

## 2021-09-22 DIAGNOSIS — Z98.890 OTHER SPECIFIED POSTPROCEDURAL STATES: Chronic | ICD-10-CM

## 2021-09-23 DIAGNOSIS — R42 DIZZINESS AND GIDDINESS: ICD-10-CM

## 2021-10-06 ENCOUNTER — APPOINTMENT (OUTPATIENT)
Dept: SPEECH THERAPY | Facility: CLINIC | Age: 56
End: 2021-10-06

## 2021-11-03 ENCOUNTER — OUTPATIENT (OUTPATIENT)
Dept: OUTPATIENT SERVICES | Facility: HOSPITAL | Age: 56
LOS: 1 days | Discharge: HOME | End: 2021-11-03
Payer: COMMERCIAL

## 2021-11-03 DIAGNOSIS — Z98.890 OTHER SPECIFIED POSTPROCEDURAL STATES: Chronic | ICD-10-CM

## 2021-11-03 DIAGNOSIS — C61 MALIGNANT NEOPLASM OF PROSTATE: Chronic | ICD-10-CM

## 2021-11-03 DIAGNOSIS — Z90.49 ACQUIRED ABSENCE OF OTHER SPECIFIED PARTS OF DIGESTIVE TRACT: Chronic | ICD-10-CM

## 2021-11-03 DIAGNOSIS — Z90.710 ACQUIRED ABSENCE OF BOTH CERVIX AND UTERUS: Chronic | ICD-10-CM

## 2021-11-03 DIAGNOSIS — N20.1 CALCULUS OF URETER: ICD-10-CM

## 2021-11-03 PROCEDURE — 76770 US EXAM ABDO BACK WALL COMP: CPT | Mod: 26

## 2021-11-04 ENCOUNTER — NON-APPOINTMENT (OUTPATIENT)
Age: 56
End: 2021-11-04

## 2021-11-04 ENCOUNTER — APPOINTMENT (OUTPATIENT)
Dept: UROLOGY | Facility: CLINIC | Age: 56
End: 2021-11-04
Payer: COMMERCIAL

## 2021-11-04 VITALS
HEART RATE: 67 BPM | DIASTOLIC BLOOD PRESSURE: 81 MMHG | BODY MASS INDEX: 36.54 KG/M2 | WEIGHT: 214 LBS | HEIGHT: 64 IN | SYSTOLIC BLOOD PRESSURE: 134 MMHG

## 2021-11-04 PROCEDURE — 99213 OFFICE O/P EST LOW 20 MIN: CPT

## 2021-11-04 NOTE — LETTER HEADER
[FreeTextEntry3] : Hayley Lr M.D.\par Director of Urology\par [facility]\par [address] [place] [address], [address]\[place] [address]

## 2021-11-04 NOTE — ASSESSMENT
[FreeTextEntry1] : She has no new stones no evidence of hydronephrosis and urologically is feeling good.  The question then is that she needs to come here.  Practically speaking if she is seeing the nephrologist and he continues stone prevention we will periodically check her kidneys with an ultrasound to see if new stones are developing and sent her here if that happens then no.  Nice to see her if she is a marcia lady but for her to come here so I can say you are doing well does not do her any good.\par \par Additionally she does not need to doctors doing stone prevention and since the nephrologist and I have coordinated and he is going to focus on prevention and I will focus on treatment then I think that is optimal

## 2021-11-04 NOTE — LETTER BODY
[Dear  ___] : Dear  [unfilled], [Courtesy Letter:] : I had the pleasure of seeing your patient, [unfilled], in my office today. [Please see my note below.] : Please see my note below. [Sincerely,] : Sincerely, [FreeTextEntry2] : Pura Guerrier MD\par [address] [facility]\[place] [address]\par  [DrSandra  ___] : Dr. OROZCO

## 2021-11-04 NOTE — PHYSICAL EXAM
[General Appearance - Well Developed] : well developed [General Appearance - Well Nourished] : well nourished [Normal Appearance] : normal appearance [Well Groomed] : well groomed [General Appearance - In No Acute Distress] : no acute distress [Abdomen Soft] : soft [Abdomen Tenderness] : non-tender [Costovertebral Angle Tenderness] : no ~M costovertebral angle tenderness [FreeTextEntry1] : mild edema without change [] : no respiratory distress [Respiration, Rhythm And Depth] : normal respiratory rhythm and effort [Exaggerated Use Of Accessory Muscles For Inspiration] : no accessory muscle use [Oriented To Time, Place, And Person] : oriented to person, place, and time [Affect] : the affect was normal [Mood] : the mood was normal [Not Anxious] : not anxious [Normal Station and Gait] : the gait and station were normal for the patient's age

## 2021-11-04 NOTE — HISTORY OF PRESENT ILLNESS
[FreeTextEntry1] : Elina is a 56-year-old female born August 19, 1965 who underwent complicated several strep removal of a left distal ureteral stone September 2020 that was done in 2 stages because she initially was found to have a pyelonephrosis.  We had done a metabolic work-up made suggestions she has been following the stone diet he says quite religiously she has seen the nephrologist who wanted another metabolic work-up but not just yet and she had an ultrasound done yesterday and is here to review.\par \par From another viewpoint she had bariatric surgery she has a issues with it pushing on the diaphragm possible hiatal hernia and is probably going to have to go back for revision surgery as well as repair of the hernia.  I did caution her to tell the doctor that she can take Betadine on the skin as she gets a very bad reaction something we noticed the first time we did ureteroscopy.\par \par From a urologic side she is having no symptoms [None] : no symptoms

## 2021-12-02 ENCOUNTER — APPOINTMENT (OUTPATIENT)
Dept: THORACIC SURGERY | Facility: CLINIC | Age: 56
End: 2021-12-02
Payer: COMMERCIAL

## 2021-12-02 VITALS
HEIGHT: 64 IN | BODY MASS INDEX: 35.17 KG/M2 | HEART RATE: 70 BPM | RESPIRATION RATE: 18 BRPM | OXYGEN SATURATION: 96 % | WEIGHT: 206 LBS | DIASTOLIC BLOOD PRESSURE: 63 MMHG | TEMPERATURE: 96.5 F | SYSTOLIC BLOOD PRESSURE: 135 MMHG

## 2021-12-02 PROCEDURE — 99244 OFF/OP CNSLTJ NEW/EST MOD 40: CPT

## 2021-12-02 NOTE — PHYSICAL EXAM
[General Appearance - Alert] : alert [General Appearance - In No Acute Distress] : in no acute distress [General Appearance - Well Nourished] : well nourished [] : no respiratory distress [Exaggerated Use Of Accessory Muscles For Inspiration] : no accessory muscle use [Examination Of The Chest] : the chest was normal in appearance [Oriented To Time, Place, And Person] : oriented to person, place, and time [Impaired Insight] : insight and judgment were intact [Affect] : the affect was normal

## 2021-12-02 NOTE — ASSESSMENT
[FreeTextEntry1] : 56 year old female, never smoker, with a PMHx of HTN, HLD, DM, kidney stones, NIDIA endometrial cancer 1999 s/p hysterectomy, sleeve gastrectomy (2013, Dr. Ornelas). Patient is referred to me for hiatal hernia and associated GERD. Patient had EGD performed showing LA Grade B esophagitis and had a pH study performed with BRAVO revealing a DeMeester score of  > 14.7 on 2 of 4 days. Esophagram done on 8/5/21 significant for hiatal hernia. She presents today for an initial evaluation. She is referred by Dr. Iraheta.\par \par Patient reports symptoms of GERD. Currently taking PPI.\par \par EGD cone on 5/12/21:\par - LA Grade B esophagitis\par BRAVO: \par Demeester: > 14.7 on 2 of 4 days\par \par \par barium esophagram done on 8/5/21:\par - sliding hiatal hernia \par - minimal esophageal dysmotility\par \par Patient is unsure if she had GERD prior to her sleeve gastrectomy. Sleeve gastrectomy was complicated by a leak within the first post-operative week. On exam there is no laparotomy scar. \par \par I discussed treatment of hiatal hernia and associated GERD in patients without previous sleeve gastrectomy. At this time, just reducing the hernia would likely not resolved her GERD. I will refer her to Jacky Ernst in bariatric surgery to discuss possible revision of her sleeve to a Pily-en-Y gastric bypass as this will treat her hernia and improve her reflux. \par \par Patient was in agreement with this plan.\par \par PLAN\par 1.  Consultation with Dr. Ernst for revision

## 2021-12-02 NOTE — HISTORY OF PRESENT ILLNESS
[FreeTextEntry1] : 56 year old female, never smoker, with a PMHx of HTN, HLD, DM, kidney stones, NIDIA endometrial cancer 1999 s/p hysterectomy, sleeve gastrectomy (2013, Dr. Ornelas), with BRAVO study done on 5/12/21 revealing  > 14.7 on 2 of 4 days. Esophagram done on 8/5/21 revealing sliding hiatal hernia. She presents today for an initial evaluation. She is referred by Dr. Iraheta.\par \par EGD cone on 5/12/21:\par - LA Grade B esophagitis\par BRAVO: \par Demeester: > 14.7 on 2 of 4 days\par - likely a normal study\par \par barium esophagram done on 8/5/21:\par - sliding hiatal hernia \par - minimal esophageal dysmotility\par - no GERD

## 2022-01-07 ENCOUNTER — TRANSCRIPTION ENCOUNTER (OUTPATIENT)
Age: 57
End: 2022-01-07

## 2022-01-26 ENCOUNTER — APPOINTMENT (OUTPATIENT)
Dept: BARIATRICS | Facility: CLINIC | Age: 57
End: 2022-01-26
Payer: COMMERCIAL

## 2022-01-26 VITALS
OXYGEN SATURATION: 98 % | WEIGHT: 211.38 LBS | BODY MASS INDEX: 37.45 KG/M2 | DIASTOLIC BLOOD PRESSURE: 79 MMHG | SYSTOLIC BLOOD PRESSURE: 137 MMHG | TEMPERATURE: 96.3 F | HEIGHT: 63 IN | HEART RATE: 80 BPM

## 2022-01-26 DIAGNOSIS — E11.9 TYPE 2 DIABETES MELLITUS W/OUT COMPLICATIONS: ICD-10-CM

## 2022-01-26 DIAGNOSIS — E78.5 HYPERLIPIDEMIA, UNSPECIFIED: ICD-10-CM

## 2022-01-26 DIAGNOSIS — K21.9 GASTRO-ESOPHAGEAL REFLUX DISEASE W/OUT ESOPHAGITIS: ICD-10-CM

## 2022-01-26 DIAGNOSIS — Z90.3 ACQUIRED ABSENCE OF STOMACH [PART OF]: ICD-10-CM

## 2022-01-26 DIAGNOSIS — I10 ESSENTIAL (PRIMARY) HYPERTENSION: ICD-10-CM

## 2022-01-26 PROCEDURE — 99203 OFFICE O/P NEW LOW 30 MIN: CPT

## 2022-01-26 RX ORDER — OMEPRAZOLE 40 MG/1
40 CAPSULE, DELAYED RELEASE ORAL
Qty: 30 | Refills: 3 | Status: ACTIVE | COMMUNITY
Start: 2022-01-26 | End: 1900-01-01

## 2022-01-26 NOTE — REVIEW OF SYSTEMS
[Patient Intake Form Reviewed] : Patient intake form was reviewed [Abdominal Pain] : no abdominal pain [Vomiting] : no vomiting [Constipation] : no constipation [Diarrhea] : no diarrhea [Reflux/Heartburn] : reflux/heartburn [Hernia] : no hernia [Negative] : Psychiatric

## 2022-01-26 NOTE — REASON FOR VISIT
[Initial Consult] : an initial consult for [S/P Bariatric Surgery] : s/p bariatric surgery [GERD] : gerd [Other___] : [unfilled]

## 2022-01-26 NOTE — ASSESSMENT
[FreeTextEntry1] : She meets the NIH criteria for bariatric surgery. Her comorbid conditions have worsened as result of her weight regain. Her GERD is intractable and not effectively treated with high dose PPIs. The plan is to do a laparoscopic conversion of her gastric sleeve to a gastric bypass and hiatal hernia repair. This will effectively address her GERD and her obesity.

## 2022-01-26 NOTE — HISTORY OF PRESENT ILLNESS
[de-identified] : Patient is a 56 year old female who is s/p a sleeve gastrectomy at an outside institution in 2013. This operation was complicated by a staple line leak, and the patient was treated nonoperatively with IV antibiotics and TPN for over 40 days. She lost 60 pounds from that operation but has regained back 30 pounds. She complains of severe GERD. An upper endoscopy on 5/12/21 showed LA Grade B esophagitis and an esophagram on 8/5/21 showed a sliding hiatal hernia. BRAVO study showed severe acid reflux. Her BMI is currently 37.4 and her weight-related comorbidities include type 2 diabetes mellitus, GERD, hypertension, hypercholesterolemia and obstructive sleep apnea. She had a hysterectomy in 1999 due to endometrial cancer.

## 2022-02-07 ENCOUNTER — NON-APPOINTMENT (OUTPATIENT)
Age: 57
End: 2022-02-07

## 2022-02-08 ENCOUNTER — APPOINTMENT (OUTPATIENT)
Dept: OBGYN | Facility: CLINIC | Age: 57
End: 2022-02-08
Payer: COMMERCIAL

## 2022-02-08 VITALS
HEART RATE: 77 BPM | SYSTOLIC BLOOD PRESSURE: 142 MMHG | HEIGHT: 63 IN | DIASTOLIC BLOOD PRESSURE: 85 MMHG | BODY MASS INDEX: 37.21 KG/M2 | WEIGHT: 210 LBS | TEMPERATURE: 96.4 F

## 2022-02-08 DIAGNOSIS — N76.0 ACUTE VAGINITIS: ICD-10-CM

## 2022-02-08 DIAGNOSIS — Z12.4 ENCOUNTER FOR SCREENING FOR MALIGNANT NEOPLASM OF CERVIX: ICD-10-CM

## 2022-02-08 DIAGNOSIS — B96.89 ACUTE VAGINITIS: ICD-10-CM

## 2022-02-08 DIAGNOSIS — R92.8 OTHER ABNORMAL AND INCONCLUSIVE FINDINGS ON DIAGNOSTIC IMAGING OF BREAST: ICD-10-CM

## 2022-02-08 DIAGNOSIS — B37.3 CANDIDIASIS OF VULVA AND VAGINA: ICD-10-CM

## 2022-02-08 LAB
BILIRUB UR QL STRIP: NORMAL
CLARITY UR: CLEAR
COLLECTION METHOD: NORMAL
GLUCOSE UR-MCNC: >=1000
HCG UR QL: 0.2 EU/DL
HGB UR QL STRIP.AUTO: NORMAL
KETONES UR-MCNC: ABNORMAL
LEUKOCYTE ESTERASE UR QL STRIP: NORMAL
NITRITE UR QL STRIP: NORMAL
PH UR STRIP: 5
PROT UR STRIP-MCNC: NORMAL
SP GR UR STRIP: 1.02

## 2022-02-08 PROCEDURE — 99396 PREV VISIT EST AGE 40-64: CPT

## 2022-02-08 PROCEDURE — 81003 URINALYSIS AUTO W/O SCOPE: CPT | Mod: QW

## 2022-02-09 NOTE — ED ADULT TRIAGE NOTE - HEIGHT IN CM
2022  EMPLOYEE INFORMATION: EMPLOYER INFORMATION:   NAME: Stanley Davis [address]    : 1995 [phone]    DATE OF INJURY/EVENT: 2022            Location: [address] [place] [address]   Treating Provider: MICK Dominguez  Time In:  9:39 AM Time Out:  10:20 AM      DIAGNOSIS:   1. Open wound of left lower extremity, subsequent encounter    2. Contusion of left lower extremity, subsequent encounter      STATUS: This injury is determined to be WORK RELATED. RETURN TO [facility] may return to work with restrictions. Return Date: 2022            RESTRICTIONS: Restrictions are to be followed at work and at home. Restrictions are in effect until next follow-up visit. Allow to ice and elevate left leg 10-15 minutes every 2 hours as needed. TREATMENT PLAN:   Medications for this injury/condition: OTC analgesics as needed. Referral/Consult: None  Diagnostic Testing: None       Instructions: Continue to keep wound clean and covered with antibiotic ointment and a cover dressing until closed. Monitor for signs of infection and call or seek prompt re-evaluation if you develop redness, warmth, increased swelling/pain or abnormal wound drainage. Perform range of motion exercises of the left ankle and apply ice to reduce swelling. NEXT RETURN VISIT:  @ 10am  Thank you for the privilege of providing medical care for this injury/condition. If there are any questions, please call the occupational health clinic at Dept: [phone]. Electronically signed on 2022 at 10:20 AM by:   Anne Coelho, [address] [place] [address] below agrees with the plan and restrictions placed on the patient by the provider above.   Yu Curtis MD
162.56

## 2022-02-12 LAB — HPV HIGH+LOW RISK DNA PNL CVX: NOT DETECTED

## 2022-02-15 LAB — CYTOLOGY CVX/VAG DOC THIN PREP: ABNORMAL

## 2022-02-17 ENCOUNTER — APPOINTMENT (OUTPATIENT)
Dept: NEUROLOGY | Facility: CLINIC | Age: 57
End: 2022-02-17

## 2022-02-18 ENCOUNTER — RESULT REVIEW (OUTPATIENT)
Age: 57
End: 2022-02-18

## 2022-02-18 ENCOUNTER — OUTPATIENT (OUTPATIENT)
Dept: OUTPATIENT SERVICES | Facility: HOSPITAL | Age: 57
LOS: 1 days | Discharge: HOME | End: 2022-02-18
Payer: COMMERCIAL

## 2022-02-18 DIAGNOSIS — Z98.890 OTHER SPECIFIED POSTPROCEDURAL STATES: Chronic | ICD-10-CM

## 2022-02-18 DIAGNOSIS — Z90.710 ACQUIRED ABSENCE OF BOTH CERVIX AND UTERUS: Chronic | ICD-10-CM

## 2022-02-18 DIAGNOSIS — Z90.49 ACQUIRED ABSENCE OF OTHER SPECIFIED PARTS OF DIGESTIVE TRACT: Chronic | ICD-10-CM

## 2022-02-18 DIAGNOSIS — R92.8 OTHER ABNORMAL AND INCONCLUSIVE FINDINGS ON DIAGNOSTIC IMAGING OF BREAST: ICD-10-CM

## 2022-02-18 DIAGNOSIS — C61 MALIGNANT NEOPLASM OF PROSTATE: Chronic | ICD-10-CM

## 2022-02-18 PROCEDURE — 77066 DX MAMMO INCL CAD BI: CPT | Mod: 26

## 2022-02-18 PROCEDURE — G0279: CPT | Mod: 26

## 2022-02-18 PROCEDURE — 77062 BREAST TOMOSYNTHESIS BI: CPT | Mod: 26

## 2022-03-06 PROBLEM — R92.8 MAMMOGRAM ABNORMAL: Status: ACTIVE | Noted: 2020-01-14

## 2022-03-06 PROBLEM — Z12.4 ROUTINE PAPANICOLAOU SMEAR: Status: ACTIVE | Noted: 2021-01-22

## 2022-03-06 NOTE — HISTORY OF PRESENT ILLNESS
[Patient reported PAP Smear was normal] : Patient reported PAP Smear was normal [Patient reported bone density results were normal] : Patient reported bone density results were normal [LMP unknown] : LMP unknown [postmenopausal] : postmenopausal [unknown] : Patient is unsure of the date of her LMP [Menarche Age: ____] : age at menarche was [unfilled] [Currently Active] : currently active [Patient reported colonoscopy was normal] : Patient reported colonoscopy was normal [Y] : Patient is sexually active [N] : Patient denies prior pregnancies [Currently In Menopause] : currently in menopause [Menopause Age: ____] : age at menopause was [unfilled] [Patient reported mammogram was abnormal] : Patient reported mammogram was abnormal [Gonorrhea test offered] : Gonorrhea test offered [Chlamydia test offered] : Chlamydia test offered [Trichomonas test offered] : Trichomonas test offered [HPV test offered] : HPV test offered [Men] : men [No] : No [FreeTextEntry1] : Pt is here for her annual exam, overall pt feels good, denies any pelvic pain or abnormal bleeding.  [TextBox_4] : 56-year-old G0 in menopause since surgical menopause at 30 years of age for endometrial cancer.  She denies postmenopausal bleeding, pelvic pain, or dysuria.  She does complain of some vaginal itching with irritation and slight odor with discharge.  Her last A1c was 9%.  She denies history of abnormal Pap, HPV or STDs.  She is sexually active with one male partner no condoms [Mammogramdate] : 2021 [TextBox_19] : BI-RADS 3-will give referral [PapSmeardate] : 2021 [BoneDensityDate] : 2/2021 [ColonoscopyDate] : 2017 [PGHxTotal] : 0

## 2022-03-06 NOTE — DISCUSSION/SUMMARY
[FreeTextEntry1] : A: 56-year-old for annual GYN exam, menopause, history endometrial cancer, Candida/BV, BMI 36\par \par P: GYN exam done\par Pap smear vaginal cuff done\par Prescription for MetroGel and Diflucan given\par Safe sex practices\par Pain and bleeding precautions\par Encourage healthy diet and exercise\par Referral for follow-up breast imaging with diagnostic mammogram given\par Follow-up with GI as recommended\par Follow-up for routine exam or as needed

## 2022-03-06 NOTE — PHYSICAL EXAM
[Appropriately responsive] : appropriately responsive [Alert] : alert [No Acute Distress] : no acute distress [No Lymphadenopathy] : no lymphadenopathy [Regular Rate Rhythm] : regular rate rhythm [Soft] : soft [Non-tender] : non-tender [Non-distended] : non-distended [No Mass] : no mass [Oriented x3] : oriented x3 [Examination Of The Breasts] : a normal appearance [No Discharge] : no discharge [No Masses] : no breast masses were palpable [Vulvar Atrophy] : vulvar atrophy [Labia Majora] : normal [No Lesions] : no lesions  [Labia Minora] : normal [Normal] : normal [No Bleeding] : There was no active vaginal bleeding [Absent] : absent [Uterine Adnexae] : absent [FreeTextEntry4] : Minimal white-yellow discharge with mild odor, Pap smear vaginal cuff done [FreeTextEntry5] : Pap smear done [FreeTextEntry6] : Exam limited due to habitus

## 2022-04-04 NOTE — ED PROVIDER NOTE - CARE PROVIDER_API CALL
Chico Casas)  Cardiology; Interventional Cardiology  [address] [place] [address]  Phone: [phone]  Fax: [phone]  Follow Up Time:    Provider Procedure Text (A): After obtaining clear surgical margins the defect was repaired by another provider.

## 2022-04-11 ENCOUNTER — TRANSCRIPTION ENCOUNTER (OUTPATIENT)
Age: 57
End: 2022-04-11

## 2022-05-18 ENCOUNTER — APPOINTMENT (OUTPATIENT)
Age: 57
End: 2022-05-18
Payer: COMMERCIAL

## 2022-05-18 VITALS
BODY MASS INDEX: 37.92 KG/M2 | OXYGEN SATURATION: 98 % | HEART RATE: 74 BPM | SYSTOLIC BLOOD PRESSURE: 120 MMHG | DIASTOLIC BLOOD PRESSURE: 70 MMHG | RESPIRATION RATE: 14 BRPM | HEIGHT: 63 IN | WEIGHT: 214 LBS

## 2022-05-18 PROCEDURE — 99213 OFFICE O/P EST LOW 20 MIN: CPT

## 2022-05-18 RX ORDER — AZELASTINE HYDROCHLORIDE 205.5 UG/1
0.15 SPRAY, METERED NASAL
Qty: 1 | Refills: 5 | Status: ACTIVE | COMMUNITY
Start: 2022-05-18 | End: 1900-01-01

## 2022-05-18 RX ORDER — BUDESONIDE AND FORMOTEROL FUMARATE DIHYDRATE 160; 4.5 UG/1; UG/1
160-4.5 AEROSOL RESPIRATORY (INHALATION) TWICE DAILY
Qty: 1 | Refills: 5 | Status: ACTIVE | COMMUNITY
Start: 2022-05-18 | End: 1900-01-01

## 2022-06-16 ENCOUNTER — OUTPATIENT (OUTPATIENT)
Dept: OUTPATIENT SERVICES | Facility: HOSPITAL | Age: 57
LOS: 1 days | Discharge: HOME | End: 2022-06-16
Payer: COMMERCIAL

## 2022-06-16 ENCOUNTER — RESULT REVIEW (OUTPATIENT)
Age: 57
End: 2022-06-16

## 2022-06-16 DIAGNOSIS — C61 MALIGNANT NEOPLASM OF PROSTATE: Chronic | ICD-10-CM

## 2022-06-16 DIAGNOSIS — R07.9 CHEST PAIN, UNSPECIFIED: ICD-10-CM

## 2022-06-16 DIAGNOSIS — Z98.890 OTHER SPECIFIED POSTPROCEDURAL STATES: Chronic | ICD-10-CM

## 2022-06-16 DIAGNOSIS — Z90.710 ACQUIRED ABSENCE OF BOTH CERVIX AND UTERUS: Chronic | ICD-10-CM

## 2022-06-16 DIAGNOSIS — Z90.49 ACQUIRED ABSENCE OF OTHER SPECIFIED PARTS OF DIGESTIVE TRACT: Chronic | ICD-10-CM

## 2022-06-16 PROCEDURE — 71046 X-RAY EXAM CHEST 2 VIEWS: CPT | Mod: 26

## 2022-06-28 ENCOUNTER — APPOINTMENT (OUTPATIENT)
Dept: SURGERY | Facility: CLINIC | Age: 57
End: 2022-06-28
Payer: COMMERCIAL

## 2022-06-28 VITALS — BODY MASS INDEX: 38.09 KG/M2 | WEIGHT: 215 LBS | HEIGHT: 63 IN

## 2022-06-28 DIAGNOSIS — K42.9 UMBILICAL HERNIA W/OUT OBSTRUCTION OR GANGRENE: ICD-10-CM

## 2022-06-28 DIAGNOSIS — E66.01 MORBID (SEVERE) OBESITY DUE TO EXCESS CALORIES: ICD-10-CM

## 2022-06-28 PROCEDURE — 99243 OFF/OP CNSLTJ NEW/EST LOW 30: CPT

## 2022-06-28 NOTE — CONSULT LETTER
[Dear  ___] : Dear  [unfilled], [Courtesy Letter:] : I had the pleasure of seeing your patient, [unfilled], in my office today. [Please see my note below.] : Please see my note below. [Consult Closing:] : Thank you very much for allowing me to participate in the care of this patient.  If you have any questions, please do not hesitate to contact me. [FreeTextEntry3] : Respectfully,\par \par Fady Clemens M.D., FACS\par

## 2022-06-28 NOTE — PHYSICAL EXAM
[Normal Breath Sounds] : Normal breath sounds [No Rash or Lesion] : No rash or lesion [Alert] : alert [Calm] : calm [JVD] : no jugular venous distention  [de-identified] : Overweight [de-identified] : Normal [de-identified] : Moderately protuberant abdomen, moderate diastases recti [de-identified] : No obvious palpable hernia

## 2022-06-28 NOTE — ASSESSMENT
[FreeTextEntry1] : Mary is a pleasant 56-year-old  with a past medical history significant for hypertension, diabetes, GERD, gastric sleeve surgery, a supraumbilical hernia repair with mesh by me in May 2011 along with recently diagnosed hiatal hernia presenting with concerns about abdominal swelling and some mild mid abdominal discomfort.\par \par Physical examination demonstrates no evidence of an obvious hernia recurrence in the periumbilical region and no new anterior abdominal wall hernia.  She does have a moderate to large diastases recti which is likely related to her excess abdominal weight and moderately protuberant abdomen.  Her current BMI is 38.\par \par She had a CT scan of the abdomen and pelvis done in May 2020 which demonstrates a tiny fat-containing periumbilical hernia which is of no significant concern at this time as it is not even palpable on physical examination.  She is planning on a revision of her gastric surgery and possible hiatal hernia repair with a surgeon at [facility].\par \par Mary was counseled and reassured.  I do not believe any further surgical intervention is warranted on her anterior abdominal wall and I do not believe repeat studies are indicated at this time.  She will continue to monitor her umbilical area and if she develops a bulge or significant symptoms she may return to me for reevaluation.  We also discussed the importance of calorie restriction and healthy eating with regard to weight loss, hernia recurrence and her overall health.

## 2022-07-10 ENCOUNTER — NON-APPOINTMENT (OUTPATIENT)
Age: 57
End: 2022-07-10

## 2022-07-18 ENCOUNTER — APPOINTMENT (OUTPATIENT)
Dept: ORTHOPEDIC SURGERY | Facility: CLINIC | Age: 57
End: 2022-07-18

## 2022-07-27 ENCOUNTER — APPOINTMENT (OUTPATIENT)
Dept: SURGERY | Facility: CLINIC | Age: 57
End: 2022-07-27

## 2022-08-16 ENCOUNTER — APPOINTMENT (OUTPATIENT)
Age: 57
End: 2022-08-16

## 2022-08-23 ENCOUNTER — APPOINTMENT (OUTPATIENT)
Dept: OBGYN | Facility: CLINIC | Age: 57
End: 2022-08-23

## 2022-08-23 VITALS
DIASTOLIC BLOOD PRESSURE: 82 MMHG | BODY MASS INDEX: 37.56 KG/M2 | TEMPERATURE: 97.9 F | HEIGHT: 63 IN | WEIGHT: 212 LBS | SYSTOLIC BLOOD PRESSURE: 128 MMHG | HEART RATE: 79 BPM

## 2022-08-23 DIAGNOSIS — N89.8 OTHER SPECIFIED NONINFLAMMATORY DISORDERS OF VAGINA: ICD-10-CM

## 2022-08-23 DIAGNOSIS — Z78.0 ASYMPTOMATIC MENOPAUSAL STATE: ICD-10-CM

## 2022-08-23 DIAGNOSIS — R82.90 UNSPECIFIED ABNORMAL FINDINGS IN URINE: ICD-10-CM

## 2022-08-23 LAB
BILIRUB UR QL STRIP: NORMAL
CLARITY UR: CLEAR
COLLECTION METHOD: NORMAL
GLUCOSE UR-MCNC: ABNORMAL
HCG UR QL: 0.2 EU/DL
HGB UR QL STRIP.AUTO: NORMAL
KETONES UR-MCNC: ABNORMAL
LEUKOCYTE ESTERASE UR QL STRIP: NORMAL
NITRITE UR QL STRIP: NORMAL
PH UR STRIP: 5.5
PROT UR STRIP-MCNC: NORMAL
SP GR UR STRIP: 1.02

## 2022-08-23 PROCEDURE — 99213 OFFICE O/P EST LOW 20 MIN: CPT

## 2022-08-23 PROCEDURE — 81003 URINALYSIS AUTO W/O SCOPE: CPT | Mod: QW

## 2022-08-28 PROBLEM — Z78.0 MENOPAUSE: Status: ACTIVE | Noted: 2020-03-04

## 2022-08-28 PROBLEM — N89.8 VAGINAL ODOR: Status: ACTIVE | Noted: 2022-08-23

## 2022-08-28 LAB
A VAGINAE DNA VAG QL NAA+PROBE: NORMAL
BACTERIA UR CULT: NORMAL
BVAB2 DNA VAG QL NAA+PROBE: NORMAL
C KRUSEI DNA VAG QL NAA+PROBE: NEGATIVE
C TRACH RRNA SPEC QL NAA+PROBE: NEGATIVE
MEGA1 DNA VAG QL NAA+PROBE: NORMAL
N GONORRHOEA RRNA SPEC QL NAA+PROBE: NEGATIVE
T VAGINALIS RRNA SPEC QL NAA+PROBE: NEGATIVE

## 2022-08-28 NOTE — HISTORY OF PRESENT ILLNESS
Good sugar control (A1C). Normal thyroid test. Persistent high micro-albumin in urine. Good cholesterol numbers. Normal kidney and liver function. Blood counts show borderline anemia. [Gonorrhea test offered] : Gonorrhea test offered [Chlamydia test offered] : Chlamydia test offered [Trichomonas test offered] : Trichomonas test offered [FreeTextEntry1] : Patient is here for evaluation vaginal odor on and off for months , s/p hysterectomy , endometrial Ca 1996 [TextBox_4] : 56-year-old in menopause came complaining of vaginal odor on and off with slight discharge.  She also has some intermittent irritation and states her urine has odor.  Patient denies postmenopausal bleeding, pelvic pain, or dysuria.  Last time she had this complaint she was treated with both Diflucan and MetroGel and she states that the symptoms got better.\par \par U dip: Ketones, glucose

## 2022-08-28 NOTE — DISCUSSION/SUMMARY
[FreeTextEntry1] : A: 57-year-old in menopause, vaginal odor with irritation, urine odor, vulvovaginal atrophy, BMI 37\par \par P: GYN exam done\par Genital culture done\par Urine culture sent\par Diflucan and MetroGel Rx sent\par Pain and bleeding precautions\par Safe sex practices\par Encouraged healthy diet and exercise\par Follow-up for routine exam or as needed

## 2022-08-28 NOTE — PHYSICAL EXAM
[Appropriately responsive] : appropriately responsive [Alert] : alert [No Acute Distress] : no acute distress [Soft] : soft [Non-tender] : non-tender [Non-distended] : non-distended [No Mass] : no mass [Oriented x3] : oriented x3 [Vulvar Atrophy] : vulvar atrophy [No Lesions] : no lesions  [Labia Majora] : normal [Labia Minora] : normal [Normal] : normal [No Bleeding] : There was no active vaginal bleeding [Absent] : absent [Uterine Adnexae] : absent [FreeTextEntry4] : Slight discharge with odor-genital culture done

## 2022-09-01 DIAGNOSIS — R82.90 UNSPECIFIED ABNORMAL FINDINGS IN URINE: ICD-10-CM

## 2022-09-11 LAB — BACTERIA UR CULT: NORMAL

## 2022-10-10 NOTE — ED ADULT TRIAGE NOTE - DOMESTIC TRAVEL HIGH RISK QUESTION
Pt reports she has a lip ulcer on her lower lip that started 1.5 weeks ago. PCP's advice has not helped (they informed her to use mupirocin and 5 days course of 20 mg of prednisone). Started medications 10/07/22 after seeing PCP. States every morning she wakes up and the sore is crusted and oozing blood. Denies fever      Medications: hcq, triamcinolone    06/21/22  1. Glomerular Disease in Systemic Lupus Erythematosus/Rash: I asked that she obtain labs to assess disease activity levels. She agrees to go later today. I will obtain SUKHI with panel, Complement levels, CRP and ESR. After she has had labs, I advised her to call our office and we can then prescribe a prednisone taper. She agreed. I will give a prednisone 3 day taper to off. I will continue the hydroxychloroquine twice daily at this time. If her labs suggest active lupus, we may consider resuming the Rituxan versus a trial of Saphnelo.     2.  Total time spent with the patient on the telephone today was 11 minutes. I reviewed the side effects of all medications prescribed by me as listed in the physician's desk reference and in the package inserts.  Other reasonable and generally accepted treatment options, including the option to do nothing at all, were discussed. The patient was instructed by me to contact our office if the symptoms have not improved, worsened, or if there are any issues/concerns. The patient will return to clinic in Return in about 4 months (around 10/21/2022).. The supervising physician for this visit is Dr. Norris Aguilar.       No

## 2022-10-17 ENCOUNTER — NON-APPOINTMENT (OUTPATIENT)
Age: 57
End: 2022-10-17

## 2022-10-17 DIAGNOSIS — N20.0 CALCULUS OF KIDNEY: ICD-10-CM

## 2022-10-21 ENCOUNTER — TRANSCRIPTION ENCOUNTER (OUTPATIENT)
Age: 57
End: 2022-10-21

## 2022-10-21 ENCOUNTER — OUTPATIENT (OUTPATIENT)
Dept: OUTPATIENT SERVICES | Facility: HOSPITAL | Age: 57
LOS: 1 days | Discharge: HOME | End: 2022-10-21

## 2022-10-21 DIAGNOSIS — C61 MALIGNANT NEOPLASM OF PROSTATE: Chronic | ICD-10-CM

## 2022-10-21 DIAGNOSIS — N20.0 CALCULUS OF KIDNEY: ICD-10-CM

## 2022-10-21 DIAGNOSIS — Z98.890 OTHER SPECIFIED POSTPROCEDURAL STATES: Chronic | ICD-10-CM

## 2022-10-21 DIAGNOSIS — Z90.49 ACQUIRED ABSENCE OF OTHER SPECIFIED PARTS OF DIGESTIVE TRACT: Chronic | ICD-10-CM

## 2022-10-21 DIAGNOSIS — Z90.710 ACQUIRED ABSENCE OF BOTH CERVIX AND UTERUS: Chronic | ICD-10-CM

## 2022-10-21 PROCEDURE — 74176 CT ABD & PELVIS W/O CONTRAST: CPT | Mod: 26

## 2022-11-02 ENCOUNTER — RX RENEWAL (OUTPATIENT)
Age: 57
End: 2022-11-02

## 2022-11-02 RX ORDER — FLUTICASONE PROPIONATE 50 UG/1
50 SPRAY, METERED NASAL
Qty: 16 | Refills: 3 | Status: ACTIVE | COMMUNITY
Start: 2022-05-18 | End: 1900-01-01

## 2022-11-07 ENCOUNTER — APPOINTMENT (OUTPATIENT)
Dept: UROLOGY | Facility: CLINIC | Age: 57
End: 2022-11-07

## 2022-11-07 VITALS
TEMPERATURE: 98 F | DIASTOLIC BLOOD PRESSURE: 83 MMHG | BODY MASS INDEX: 39.16 KG/M2 | OXYGEN SATURATION: 99 % | HEIGHT: 63 IN | WEIGHT: 221 LBS | RESPIRATION RATE: 14 BRPM | HEART RATE: 79 BPM | SYSTOLIC BLOOD PRESSURE: 129 MMHG

## 2022-11-07 DIAGNOSIS — N20.1 CALCULUS OF URETER: ICD-10-CM

## 2022-11-07 DIAGNOSIS — R10.9 UNSPECIFIED ABDOMINAL PAIN: ICD-10-CM

## 2022-11-07 PROCEDURE — 99214 OFFICE O/P EST MOD 30 MIN: CPT

## 2022-11-07 RX ORDER — METRONIDAZOLE 7.5 MG/G
0.75 GEL VAGINAL
Qty: 1 | Refills: 1 | Status: COMPLETED | COMMUNITY
Start: 2022-02-08 | End: 2022-11-07

## 2022-11-07 RX ORDER — FLUCONAZOLE 150 MG/1
150 TABLET ORAL
Qty: 1 | Refills: 1 | Status: COMPLETED | COMMUNITY
Start: 2022-02-08 | End: 2022-11-07

## 2022-11-07 RX ORDER — FLUCONAZOLE 150 MG/1
150 TABLET ORAL
Qty: 1 | Refills: 1 | Status: COMPLETED | COMMUNITY
Start: 2022-08-23 | End: 2022-11-07

## 2022-11-07 RX ORDER — METRONIDAZOLE 7.5 MG/G
0.75 GEL VAGINAL
Qty: 1 | Refills: 1 | Status: COMPLETED | COMMUNITY
Start: 2022-08-23 | End: 2022-11-07

## 2022-11-07 NOTE — LETTER HEADER
[FreeTextEntry3] : Hayley Lr M.D.\par Director of Urology\par [facility]\par [address] [place] [address], [address]\[place] [address]

## 2022-11-07 NOTE — HISTORY OF PRESENT ILLNESS
[None] : no symptoms [FreeTextEntry1] : Mary is a 57-year-old female, born August 19, 1965 last seen November 4, 2021 with a history of ureteral stones status post staged left ureteroscopy laser lithotripsy, who presents today unexpectedly after she contacted the office complaining of left-sided flank pain on 10/17/2022.\par \par In mid October patient began experiencing left-sided flank pain rating across the abdomen.  She presented to her PCP who ordered renal/bladder ultrasound which were negative for hydronephrosis and bilateral jets noted.  She contacted the office and was sent for CT scan which was done on October 21, 2022.\par \par She presents today to review her CT scan.\par \par She denies passing any stones however, she states her flank pain has now significantly improved.  She is voiding well denies any bothersome lower urinary tract symptoms, dysuria, gross hematuria, nausea/vomiting, chills, fever, lightheadedness, dizziness, further neurological/constitutional symptoms.

## 2022-11-07 NOTE — END OF VISIT
[FreeTextEntry3] : I, Dr. Lr, personally performed the evaluation and management (E/M) services for this established patient who presents today with (a) new problem(s)/exacerbation of (an) existing condition(s).  That E/M includes conducting the examination, assessing all new/exacerbated conditions, and establishing a new plan of care.  Today, my ACP, Arsh Rainey was here to observe my evaluation and management services for this new problem/exacerbated condition to be followed going forward.

## 2022-11-07 NOTE — ASSESSMENT
[FreeTextEntry1] : Her pain has now resolved.  Her CT, renal/bladder/abdominal ultrasound are completely negative except for the perinephric stranding on the left which may be old.  She followed up with nephrology but did not feel they did anything for him, however, she has not yet repeated metabolic work-up.\par Additionally she will contact her insurance company to see if there is a nutritionist that could manage her stone risk.\par \par She Will obtain a repeat metabolic work-up and follow-up for review.  \par

## 2022-11-07 NOTE — PHYSICAL EXAM
[General Appearance - Well Developed] : well developed [General Appearance - Well Nourished] : well nourished [Normal Appearance] : normal appearance [General Appearance - In No Acute Distress] : no acute distress [Well Groomed] : well groomed [Abdomen Soft] : soft [Abdomen Tenderness] : non-tender [Costovertebral Angle Tenderness] : no ~M costovertebral angle tenderness [] : no respiratory distress [Respiration, Rhythm And Depth] : normal respiratory rhythm and effort [Exaggerated Use Of Accessory Muscles For Inspiration] : no accessory muscle use [Oriented To Time, Place, And Person] : oriented to person, place, and time [Affect] : the affect was normal [Mood] : the mood was normal [Not Anxious] : not anxious [Normal Station and Gait] : the gait and station were normal for the patient's age [No Focal Deficits] : no focal deficits

## 2022-11-07 NOTE — LETTER BODY
[Dear  ___] : Dear  [unfilled], [Courtesy Letter:] : I had the pleasure of seeing your patient, [unfilled], in my office today. [Please see my note below.] : Please see my note below. [Sincerely,] : Sincerely, [DrSandra  ___] : Dr. OROZCO [FreeTextEntry2] : Pura Guerrier MD\par [address] [facility]\[place] [address]\par

## 2022-11-09 ENCOUNTER — NON-APPOINTMENT (OUTPATIENT)
Age: 57
End: 2022-11-09

## 2022-11-09 LAB — BACTERIA UR CULT: NORMAL

## 2022-11-28 ENCOUNTER — OUTPATIENT (OUTPATIENT)
Dept: OUTPATIENT SERVICES | Facility: HOSPITAL | Age: 57
LOS: 1 days | Discharge: HOME | End: 2022-11-28

## 2022-11-28 ENCOUNTER — RESULT REVIEW (OUTPATIENT)
Age: 57
End: 2022-11-28

## 2022-11-28 ENCOUNTER — TRANSCRIPTION ENCOUNTER (OUTPATIENT)
Age: 57
End: 2022-11-28

## 2022-11-28 VITALS
WEIGHT: 216.93 LBS | RESPIRATION RATE: 20 BRPM | HEIGHT: 64 IN | HEART RATE: 81 BPM | TEMPERATURE: 97 F | DIASTOLIC BLOOD PRESSURE: 75 MMHG | SYSTOLIC BLOOD PRESSURE: 172 MMHG

## 2022-11-28 VITALS
RESPIRATION RATE: 18 BRPM | HEART RATE: 70 BPM | OXYGEN SATURATION: 96 % | DIASTOLIC BLOOD PRESSURE: 63 MMHG | SYSTOLIC BLOOD PRESSURE: 130 MMHG

## 2022-11-28 DIAGNOSIS — Z90.710 ACQUIRED ABSENCE OF BOTH CERVIX AND UTERUS: Chronic | ICD-10-CM

## 2022-11-28 DIAGNOSIS — Z90.49 ACQUIRED ABSENCE OF OTHER SPECIFIED PARTS OF DIGESTIVE TRACT: Chronic | ICD-10-CM

## 2022-11-28 DIAGNOSIS — C61 MALIGNANT NEOPLASM OF PROSTATE: Chronic | ICD-10-CM

## 2022-11-28 DIAGNOSIS — Z98.890 OTHER SPECIFIED POSTPROCEDURAL STATES: Chronic | ICD-10-CM

## 2022-11-28 LAB — GLUCOSE BLDC GLUCOMTR-MCNC: 220 MG/DL — HIGH (ref 70–99)

## 2022-11-28 PROCEDURE — 88305 TISSUE EXAM BY PATHOLOGIST: CPT | Mod: 26

## 2022-11-28 PROCEDURE — 88312 SPECIAL STAINS GROUP 1: CPT | Mod: 26

## 2022-11-28 RX ORDER — INSULIN LISPRO 100/ML
6 VIAL (ML) SUBCUTANEOUS ONCE
Refills: 0 | Status: COMPLETED | OUTPATIENT
Start: 2022-11-28 | End: 2022-11-28

## 2022-11-28 RX ADMIN — Medication 6 UNIT(S): at 07:41

## 2022-11-28 NOTE — ASU PATIENT PROFILE, ADULT - FALL HARM RISK - UNIVERSAL INTERVENTIONS
Bed in lowest position, wheels locked, appropriate side rails in place/Call bell, personal items and telephone in reach/Instruct patient to call for assistance before getting out of bed or chair/Non-slip footwear when patient is out of bed/[place] to call system/Physically safe environment - no spills, clutter or unnecessary equipment/Purposeful Proactive Rounding/Room/bathroom lighting operational, light cord in reach

## 2022-11-28 NOTE — ASU DISCHARGE PLAN (ADULT/PEDIATRIC) - NS MD DC FALL RISK RISK
For information on Fall & Injury Prevention, visit: [email or web address] OR  [email or web address] OR  https://www.cdc.gov/steadi/patient.html

## 2022-11-28 NOTE — ASU PATIENT PROFILE, ADULT - NSICDXPASTSURGICALHX_GEN_ALL_CORE_FT
PAST SURGICAL HISTORY:  H/O hernia repair     History of cholecystectomy     S/P gastric surgery SLEEVE 2012 -post op complications. ICU X MANY WKS PER PT    S/P gastric surgery 2000 BAND PROCEDURE & REMOVED    S/P hysterectomy 1999, total

## 2022-11-28 NOTE — ASU DISCHARGE PLAN (ADULT/PEDIATRIC) - NS DC INTERPRETER YES NO
Patient seen today along with  Mattie yesterday    Saw patient today with  Daysi    Patient seen to be alert awake oriented x3 in the daytime    Had requested MOCA  testing, which was completed yesterday    Reportedly patient did not score very poorly but she was on a scale of 1-30, with 30 being perfect score, she scored I believe 22/23, indicating above some cognitive decline    Patient does show sundowning but has no insight in her behavior    Had a long discussion with daughter today    Daughter has set up an assisted living place for patient which would be ideal    My phone belief is that patient is going to resist any placement secondary to poor insight in her behavior at night    Patient may come back to us because of her behavior secondary to sundowning issues and at that point the daughter will have to be willing to go through legal process and I will have to fill  CCP 2 11 form    At this evaluation patient is not showing any suicidality, no overt psychosis, no overt confusion    If patient accepts the recommendation of going to live at assisted living place, it would be ideal but my belief is that patient is going to resist that and wants to be going back to her home    Prognosis is going to be better if patient chooses to go to assisted living place   No

## 2022-11-29 LAB
GLUCOSE BLDC GLUCOMTR-MCNC: 247 MG/DL — HIGH (ref 70–99)
SURGICAL PATHOLOGY STUDY: SIGNIFICANT CHANGE UP
SURGICAL PATHOLOGY STUDY: SIGNIFICANT CHANGE UP

## 2022-12-01 ENCOUNTER — EMERGENCY (EMERGENCY)
Facility: HOSPITAL | Age: 57
LOS: 0 days | Discharge: HOME | End: 2022-12-02
Attending: EMERGENCY MEDICINE | Admitting: EMERGENCY MEDICINE

## 2022-12-01 VITALS
HEIGHT: 64 IN | WEIGHT: 214.95 LBS | RESPIRATION RATE: 18 BRPM | DIASTOLIC BLOOD PRESSURE: 78 MMHG | HEART RATE: 79 BPM | SYSTOLIC BLOOD PRESSURE: 165 MMHG | TEMPERATURE: 97 F | OXYGEN SATURATION: 98 %

## 2022-12-01 DIAGNOSIS — Z91.013 ALLERGY TO SEAFOOD: ICD-10-CM

## 2022-12-01 DIAGNOSIS — E11.9 TYPE 2 DIABETES MELLITUS WITHOUT COMPLICATIONS: ICD-10-CM

## 2022-12-01 DIAGNOSIS — Z98.890 OTHER SPECIFIED POSTPROCEDURAL STATES: Chronic | ICD-10-CM

## 2022-12-01 DIAGNOSIS — H57.13 OCULAR PAIN, BILATERAL: ICD-10-CM

## 2022-12-01 DIAGNOSIS — Z88.0 ALLERGY STATUS TO PENICILLIN: ICD-10-CM

## 2022-12-01 DIAGNOSIS — Z87.74 PERSONAL HISTORY OF (CORRECTED) CONGENITAL MALFORMATIONS OF HEART AND CIRCULATORY SYSTEM: ICD-10-CM

## 2022-12-01 DIAGNOSIS — Z79.4 LONG TERM (CURRENT) USE OF INSULIN: ICD-10-CM

## 2022-12-01 DIAGNOSIS — Z90.710 ACQUIRED ABSENCE OF BOTH CERVIX AND UTERUS: ICD-10-CM

## 2022-12-01 DIAGNOSIS — Z88.8 ALLERGY STATUS TO OTHER DRUGS, MEDICAMENTS AND BIOLOGICAL SUBSTANCES: ICD-10-CM

## 2022-12-01 DIAGNOSIS — Z87.19 PERSONAL HISTORY OF OTHER DISEASES OF THE DIGESTIVE SYSTEM: ICD-10-CM

## 2022-12-01 DIAGNOSIS — Z87.448 PERSONAL HISTORY OF OTHER DISEASES OF URINARY SYSTEM: ICD-10-CM

## 2022-12-01 DIAGNOSIS — Z85.42 PERSONAL HISTORY OF MALIGNANT NEOPLASM OF OTHER PARTS OF UTERUS: ICD-10-CM

## 2022-12-01 DIAGNOSIS — Z90.49 ACQUIRED ABSENCE OF OTHER SPECIFIED PARTS OF DIGESTIVE TRACT: Chronic | ICD-10-CM

## 2022-12-01 DIAGNOSIS — Z87.442 PERSONAL HISTORY OF URINARY CALCULI: ICD-10-CM

## 2022-12-01 DIAGNOSIS — Z90.49 ACQUIRED ABSENCE OF OTHER SPECIFIED PARTS OF DIGESTIVE TRACT: ICD-10-CM

## 2022-12-01 DIAGNOSIS — Z91.040 LATEX ALLERGY STATUS: ICD-10-CM

## 2022-12-01 DIAGNOSIS — Z87.09 PERSONAL HISTORY OF OTHER DISEASES OF THE RESPIRATORY SYSTEM: ICD-10-CM

## 2022-12-01 DIAGNOSIS — Z90.710 ACQUIRED ABSENCE OF BOTH CERVIX AND UTERUS: Chronic | ICD-10-CM

## 2022-12-01 DIAGNOSIS — Z88.2 ALLERGY STATUS TO SULFONAMIDES: ICD-10-CM

## 2022-12-01 DIAGNOSIS — Z79.84 LONG TERM (CURRENT) USE OF ORAL HYPOGLYCEMIC DRUGS: ICD-10-CM

## 2022-12-01 DIAGNOSIS — Z91.041 RADIOGRAPHIC DYE ALLERGY STATUS: ICD-10-CM

## 2022-12-01 DIAGNOSIS — I10 ESSENTIAL (PRIMARY) HYPERTENSION: ICD-10-CM

## 2022-12-01 PROCEDURE — 99284 EMERGENCY DEPT VISIT MOD MDM: CPT

## 2022-12-01 RX ORDER — KETOROLAC TROMETHAMINE 30 MG/ML
15 SYRINGE (ML) INJECTION ONCE
Refills: 0 | Status: DISCONTINUED | OUTPATIENT
Start: 2022-12-01 | End: 2022-12-01

## 2022-12-01 RX ORDER — PROCHLORPERAZINE MALEATE 5 MG
10 TABLET ORAL ONCE
Refills: 0 | Status: COMPLETED | OUTPATIENT
Start: 2022-12-01 | End: 2022-12-01

## 2022-12-01 RX ORDER — FLUORESCEIN SODIUM 9 MG
1 STRIP OPHTHALMIC (EYE) ONCE
Refills: 0 | Status: DISCONTINUED | OUTPATIENT
Start: 2022-12-01 | End: 2022-12-02

## 2022-12-01 NOTE — ED ADULT NURSE NOTE - OBJECTIVE STATEMENT
Pt sent from [facility] for evaluation of right eye pain associated with right sided headache for three days. Pt reports pain with eye movement and blurry vision to affected eye. No neuro deficits noted in triage.

## 2022-12-01 NOTE — ED ADULT TRIAGE NOTE - CHIEF COMPLAINT QUOTE
Pt sent from [facility] for evaluation of right eye pain associated with right sided headache for three days. Pt reports pain with eye movement and blurry vision to affected eye. No neuro deficits noted in triage.

## 2022-12-01 NOTE — ED ADULT NURSE NOTE - CHIEF COMPLAINT QUOTE
Pt sent from [facility] for evaluation of right eye pain associated with right sided headache for three days. Pt reports pain with eye movement and blurry vision to affected eye. No neuro deficits noted in triage.

## 2022-12-01 NOTE — ED ADULT NURSE NOTE - NSIMPLEMENTINTERV_GEN_ALL_ED
Implemented All Universal Safety Interventions:  [place] to call system. Call bell, personal items and telephone within reach. Instruct patient to call for assistance. Room bathroom lighting operational. Non-slip footwear when patient is off stretcher. Physically safe environment: no spills, clutter or unnecessary equipment. Stretcher in lowest position, wheels locked, appropriate side rails in place.

## 2022-12-02 LAB
ALBUMIN SERPL ELPH-MCNC: 4.7 G/DL — SIGNIFICANT CHANGE UP (ref 3.5–5.2)
ALP SERPL-CCNC: 78 U/L — SIGNIFICANT CHANGE UP (ref 30–115)
ALT FLD-CCNC: 27 U/L — SIGNIFICANT CHANGE UP (ref 0–41)
ANION GAP SERPL CALC-SCNC: 9 MMOL/L — SIGNIFICANT CHANGE UP (ref 7–14)
AST SERPL-CCNC: 23 U/L — SIGNIFICANT CHANGE UP (ref 0–41)
BASOPHILS # BLD AUTO: 0.05 K/UL — SIGNIFICANT CHANGE UP (ref 0–0.2)
BASOPHILS NFR BLD AUTO: 0.6 % — SIGNIFICANT CHANGE UP (ref 0–1)
BILIRUB SERPL-MCNC: 0.3 MG/DL — SIGNIFICANT CHANGE UP (ref 0.2–1.2)
BUN SERPL-MCNC: 13 MG/DL — SIGNIFICANT CHANGE UP (ref 10–20)
CALCIUM SERPL-MCNC: 9.4 MG/DL — SIGNIFICANT CHANGE UP (ref 8.4–10.5)
CHLORIDE SERPL-SCNC: 104 MMOL/L — SIGNIFICANT CHANGE UP (ref 98–110)
CO2 SERPL-SCNC: 28 MMOL/L — SIGNIFICANT CHANGE UP (ref 17–32)
CREAT SERPL-MCNC: 0.7 MG/DL — SIGNIFICANT CHANGE UP (ref 0.7–1.5)
CRP SERPL-MCNC: 5.3 MG/L — HIGH
EGFR: 100 ML/MIN/1.73M2 — SIGNIFICANT CHANGE UP
EOSINOPHIL # BLD AUTO: 0.2 K/UL — SIGNIFICANT CHANGE UP (ref 0–0.7)
EOSINOPHIL NFR BLD AUTO: 2.5 % — SIGNIFICANT CHANGE UP (ref 0–8)
ERYTHROCYTE [SEDIMENTATION RATE] IN BLOOD: 15 MM/HR — SIGNIFICANT CHANGE UP (ref 0–20)
GLUCOSE SERPL-MCNC: 282 MG/DL — HIGH (ref 70–99)
HCT VFR BLD CALC: 40.9 % — SIGNIFICANT CHANGE UP (ref 37–47)
HGB BLD-MCNC: 13.9 G/DL — SIGNIFICANT CHANGE UP (ref 12–16)
IMM GRANULOCYTES NFR BLD AUTO: 0.1 % — SIGNIFICANT CHANGE UP (ref 0.1–0.3)
LYMPHOCYTES # BLD AUTO: 3 K/UL — SIGNIFICANT CHANGE UP (ref 1.2–3.4)
LYMPHOCYTES # BLD AUTO: 37.1 % — SIGNIFICANT CHANGE UP (ref 20.5–51.1)
MAGNESIUM SERPL-MCNC: 1.8 MG/DL — SIGNIFICANT CHANGE UP (ref 1.8–2.4)
MCHC RBC-ENTMCNC: 28.7 PG — SIGNIFICANT CHANGE UP (ref 27–31)
MCHC RBC-ENTMCNC: 34 G/DL — SIGNIFICANT CHANGE UP (ref 32–37)
MCV RBC AUTO: 84.3 FL — SIGNIFICANT CHANGE UP (ref 81–99)
MONOCYTES # BLD AUTO: 0.55 K/UL — SIGNIFICANT CHANGE UP (ref 0.1–0.6)
MONOCYTES NFR BLD AUTO: 6.8 % — SIGNIFICANT CHANGE UP (ref 1.7–9.3)
NEUTROPHILS # BLD AUTO: 4.27 K/UL — SIGNIFICANT CHANGE UP (ref 1.4–6.5)
NEUTROPHILS NFR BLD AUTO: 52.9 % — SIGNIFICANT CHANGE UP (ref 42.2–75.2)
NRBC # BLD: 0 /100 WBCS — SIGNIFICANT CHANGE UP (ref 0–0)
PLATELET # BLD AUTO: 234 K/UL — SIGNIFICANT CHANGE UP (ref 130–400)
POTASSIUM SERPL-MCNC: 5.3 MMOL/L — HIGH (ref 3.5–5)
POTASSIUM SERPL-SCNC: 5.3 MMOL/L — HIGH (ref 3.5–5)
PROT SERPL-MCNC: 6.9 G/DL — SIGNIFICANT CHANGE UP (ref 6–8)
RBC # BLD: 4.85 M/UL — SIGNIFICANT CHANGE UP (ref 4.2–5.4)
RBC # FLD: 13 % — SIGNIFICANT CHANGE UP (ref 11.5–14.5)
SODIUM SERPL-SCNC: 141 MMOL/L — SIGNIFICANT CHANGE UP (ref 135–146)
WBC # BLD: 8.08 K/UL — SIGNIFICANT CHANGE UP (ref 4.8–10.8)
WBC # FLD AUTO: 8.08 K/UL — SIGNIFICANT CHANGE UP (ref 4.8–10.8)

## 2022-12-02 RX ORDER — VALACYCLOVIR 500 MG/1
1 TABLET, FILM COATED ORAL
Qty: 21 | Refills: 0
Start: 2022-12-02 | End: 2022-12-08

## 2022-12-02 RX ADMIN — Medication 10 MILLIGRAM(S): at 02:31

## 2022-12-02 NOTE — ED PROVIDER NOTE - PATIENT PORTAL LINK FT
You can access the FollowMyHealth Patient Portal offered by [facility] by registering at the following website: [email or web address]. By joining [facility]’s FollowMyHealth portal, you will also be able to view your health information using other applications (apps) compatible with our system.

## 2022-12-02 NOTE — ED PROVIDER NOTE - CLINICAL SUMMARY MEDICAL DECISION MAKING FREE TEXT BOX
Pt evaluated after sign out. Work up reviewed. Will d/c with ophtho follow up. Valacyclovir sent to the pharmacy. Advised to return for worsening of symptoms.

## 2022-12-02 NOTE — ED PROVIDER NOTE - PHYSICAL EXAMINATION
gen- NAD, aaox3  L eye- no conj injection, EOMI, PERRL, pupil 3mm and reactive b/l, no ptosis, IOP 24, no fluorescein uptake, no visual field deficit, sono showing no vitreous/retinal detachment, lens in normal location   R eye- no conj injection, EOMI, PERRL, pupil 3mm and reactive b/l, no ptosis, IOP 21, no fluorescein uptake, no visual field deficit  Ears-   L ear- external exam normal- no erythema, ecchymosis, fluctuance, rash, vesicles, or laceration to external ear; ear canal w/o erythema, lesions or debris; TM w/o erythema, bulging or vesicles, cone of light normal; no tenderness or erythema to mastoid  card-rrr  lungs-ctab, no wheezing or rhonchi  abd-sntnd, no guarding or rebound  neuro- full str/sensation, cn ii-xii grossly intact, normal coordination  skin- rosacea to b/l cheeks, L nose bridge w/ mild increase in redness, no versicles

## 2022-12-02 NOTE — ED PROVIDER NOTE - ATTENDING CONTRIBUTION TO CARE
58 yo f hx dm, htn  pt presents for eval of L eye pain. pt states she had a colonoscopy Monday. colonoscopy was uncomplicated.  pt states after the procedure, she developed R eye pain. pain worse with certain eye movements and pain around orbit. no fevers/chills. no double vision. no neck pain. pt tried to ignore pain monday/tuesday. wednesday, pt went to optho and states workup was overall normal but she was dc w/ abx drops.  pt went to [facility] today for persistent pain and was ref to ED. no trauma.    vss  gen- NAD, aaox3  L eye- no conj injection, EOMI, PERRL, pupil 3mm and reactive b/l, no ptosis, IOP 24, no fluorescein uptake, no visual field deficit, sono showing no vitreous/retinal detachment, lens in normal location   R eye- no conj injection, EOMI, PERRL, pupil 3mm and reactive b/l, no ptosis, IOP 21, no fluorescein uptake, no visual field deficit  Ears-   L ear- external exam normal- no erythema, ecchymosis, fluctuance, rash, vesicles, or laceration to external ear; ear canal w/o erythema, lesions or debris; TM w/o erythema, bulging or vesicles, cone of light normal; no tenderness or erythema to mastoid  card-rrr  lungs-ctab, no wheezing or rhonchi  abd-sntnd, no guarding or rebound  neuro- full str/sensation, cn ii-xii grossly intact, normal coordination  skin- rosacea to b/l cheeks, L nose bridge w/ mild increase in redness, no versicles    will get screening labs, r/o temporal arteritis, pain control  possible ocular migraine  no e/o facial cellulitis  no e/o opthalmic zoster 56 yo f hx dm, htn  pt presents for eval of L eye pain. pt states she had a colonoscopy Monday. colonoscopy was uncomplicated.  pt states after the procedure, she developed R eye pain. pain worse with certain eye movements. no fevers/chills. no double vision. no neck pain. pt tried to ignore pain monday/tuesday. wednesday, pt went to optho and states workup was overall normal but she was dc w/ abx drops.  pt went to [facility] today for persistent pain and was ref to ED. no trauma.    vss  gen- NAD, aaox3  L eye- no conj injection, EOMI, PERRL, pupil 3mm and reactive b/l, no ptosis, IOP 24, no fluorescein uptake, no visual field deficit, sono showing no vitreous/retinal detachment, lens in normal location   R eye- no conj injection, EOMI, PERRL, pupil 3mm and reactive b/l, no ptosis, IOP 21, no fluorescein uptake, no visual field deficit  Ears-   L ear- external exam normal- no erythema, ecchymosis, fluctuance, rash, vesicles, or laceration to external ear; ear canal w/o erythema, lesions or debris; TM w/o erythema, bulging or vesicles, cone of light normal; no tenderness or erythema to mastoid  card-rrr  lungs-ctab, no wheezing or rhonchi  abd-sntnd, no guarding or rebound  neuro- full str/sensation, cn ii-xii grossly intact, normal coordination  skin- rosacea to b/l cheeks, L nose bridge w/ mild increase in redness, no versicles    will get screening labs, r/o temporal arteritis, pain control  possible ocular migraine  no e/o facial cellulitis  no e/o opthalmic zoster

## 2022-12-02 NOTE — ED PROVIDER NOTE - NSFOLLOWUPINSTRUCTIONS_ED_ALL_ED_FT
Eye Pain    WHAT YOU NEED TO KNOW:    Eye pain may be caused by a problem within your eye. A problem or condition in another body area can also cause pain that travels to your eye. Some causes of eye pain include dry eyes, inflammation, a sinus infection, or a cluster headache.    DISCHARGE INSTRUCTIONS:    Medicines: You may need any of the following:     Artificial tears are eyedrops that can help moisturize your eyes and relieve your pain. Ask your healthcare provider how often to use artificial tears.       NSAIDs, such as ibuprofen, help decrease swelling, pain, and fever. This medicine is available with or without a doctor's order. NSAIDs can cause stomach bleeding or kidney problems in certain people. If you take blood thinner medicine, always ask if NSAIDs are safe for you. Always read the medicine label and follow directions. Do not give these medicines to children under 6 months of age without direction from your child's healthcare provider.      Take your medicine as directed. Contact your healthcare provider if you think your medicine is not helping or if you have side effects. Tell him of her if you are allergic to any medicine. Keep a list of the medicines, vitamins, and herbs you take. Include the amounts, and when and why you take them. Bring the list or the pill bottles to follow-up visits. Carry your medicine list with you in case of an emergency.    Follow up with your healthcare provider as directed: You may be referred to an eye specialist. Write down your questions so you remember to ask them during your visits.    Contact your healthcare provider if:     You have a fever.       Your eye pain gets worse when you move your eyes.       You have questions or concerns about your condition or care.    Return to the emergency department if:     You have any vision loss.       You have sudden vision changes such as blurred vision, double vision, or seeing halos around lights.       You develop severe eye pain.          © Copyright [employer] 2019 All illustrations and images included in CareNotes are the copyrighted property of [employer] or [employer].

## 2022-12-02 NOTE — ED PROVIDER NOTE - NS ED ROS FT
Constitutional: no fever  Eye: + R eye pain  Cardiovascular: no chest pain  Respiratory:  no cough  Abdominal: no abdominal pain  Neurological: no altered mental status, numbness, weakness, neck pain

## 2022-12-02 NOTE — ED PROVIDER NOTE - CARE PROVIDER_API CALL
Nena Mckeonh  INTERNAL MEDICINE  [address], [address]  [place] [address]  Phone: [phone]  Fax: [phone]  Follow Up Time: 1-3 Days

## 2022-12-02 NOTE — ED PROVIDER NOTE - NSFOLLOWUPCLINICS_GEN_ALL_ED_FT
[facility]  Ophthalmolgy  [address], [address]  [place] [address]  Phone: [phone]  Fax:   Follow Up Time: 1-3 Days

## 2022-12-02 NOTE — ED PROVIDER NOTE - OBJECTIVE STATEMENT
58 yo f w/o sig pmh presents w LT eye pain. admits to colonoscopy monday that was uncomplicated, afterwards be 58 yo f w/o sig pmh presents w LT eye pain. admits to colonoscopy monday that was uncomplicated, afterwards developed R eye pain. pain worsened with certain movements.  pt states she has since followed up with opthalmology and had a negative exam.  pain was persistent so she went to  who rec ED eval.

## 2022-12-02 NOTE — ED ADULT NURSE REASSESSMENT NOTE - NS ED NURSE REASSESS COMMENT FT1
Patient and family updated on plan of care regarding followup care and medications. Patient verbalized understanding.

## 2022-12-05 DIAGNOSIS — K31.A0 GASTRIC INTESTINAL METAPLASIA, UNSPECIFIED: ICD-10-CM

## 2022-12-05 DIAGNOSIS — Z85.038 PERSONAL HISTORY OF OTHER MALIGNANT NEOPLASM OF LARGE INTESTINE: ICD-10-CM

## 2022-12-05 DIAGNOSIS — K22.10 ULCER OF ESOPHAGUS WITHOUT BLEEDING: ICD-10-CM

## 2022-12-05 DIAGNOSIS — Z12.11 ENCOUNTER FOR SCREENING FOR MALIGNANT NEOPLASM OF COLON: ICD-10-CM

## 2022-12-05 DIAGNOSIS — K64.4 RESIDUAL HEMORRHOIDAL SKIN TAGS: ICD-10-CM

## 2022-12-05 DIAGNOSIS — K44.9 DIAPHRAGMATIC HERNIA WITHOUT OBSTRUCTION OR GANGRENE: ICD-10-CM

## 2022-12-05 DIAGNOSIS — K63.5 POLYP OF COLON: ICD-10-CM

## 2022-12-05 DIAGNOSIS — K20.80 OTHER ESOPHAGITIS WITHOUT BLEEDING: ICD-10-CM

## 2022-12-05 DIAGNOSIS — K29.80 DUODENITIS WITHOUT BLEEDING: ICD-10-CM

## 2022-12-05 DIAGNOSIS — K64.0 FIRST DEGREE HEMORRHOIDS: ICD-10-CM

## 2022-12-05 DIAGNOSIS — K21.9 GASTRO-ESOPHAGEAL REFLUX DISEASE WITHOUT ESOPHAGITIS: ICD-10-CM

## 2022-12-09 ENCOUNTER — APPOINTMENT (OUTPATIENT)
Age: 57
End: 2022-12-09

## 2022-12-19 ENCOUNTER — APPOINTMENT (OUTPATIENT)
Dept: UROLOGY | Facility: CLINIC | Age: 57
End: 2022-12-19

## 2023-01-11 ENCOUNTER — NON-APPOINTMENT (OUTPATIENT)
Age: 58
End: 2023-01-11

## 2023-01-11 DIAGNOSIS — Z12.31 ENCOUNTER FOR SCREENING MAMMOGRAM FOR MALIGNANT NEOPLASM OF BREAST: ICD-10-CM

## 2023-01-11 RX ORDER — FLUCONAZOLE 150 MG/1
150 TABLET ORAL
Qty: 1 | Refills: 0 | Status: ACTIVE | COMMUNITY
Start: 2023-01-11 | End: 1900-01-01

## 2023-01-19 NOTE — ED ADULT TRIAGE NOTE - GLASGOW COMA SCALE: BEST MOTOR RESPONSE, MLM
Discussed the risks/benefits of YAG Laser Capsulotomy.
Indications, risks, benefits and alternatives to YAG capsulotomy discussed with patient. Questions answered. Educational handout given.
Monitor.
Monocular precautions discussed.
OCT mac done today.
Patient advised to call if any problems, questions, or concerns.
Patient elects to proceed with YAG capsulotomy.
Patient made aware of 24/7 emergency services.
Patient understands condition, prognosis and need for follow up care.
Stable.
Understands that vision may be limited by retinal pathology.
(M6) obeys commands

## 2023-02-17 ENCOUNTER — RESULT REVIEW (OUTPATIENT)
Age: 58
End: 2023-02-17

## 2023-02-17 ENCOUNTER — OUTPATIENT (OUTPATIENT)
Dept: OUTPATIENT SERVICES | Facility: HOSPITAL | Age: 58
LOS: 1 days | End: 2023-02-17
Payer: COMMERCIAL

## 2023-02-17 DIAGNOSIS — Z98.890 OTHER SPECIFIED POSTPROCEDURAL STATES: Chronic | ICD-10-CM

## 2023-02-17 DIAGNOSIS — Z90.710 ACQUIRED ABSENCE OF BOTH CERVIX AND UTERUS: Chronic | ICD-10-CM

## 2023-02-17 DIAGNOSIS — Z12.31 ENCOUNTER FOR SCREENING MAMMOGRAM FOR MALIGNANT NEOPLASM OF BREAST: ICD-10-CM

## 2023-02-17 DIAGNOSIS — Z00.8 ENCOUNTER FOR OTHER GENERAL EXAMINATION: ICD-10-CM

## 2023-02-17 DIAGNOSIS — Z90.49 ACQUIRED ABSENCE OF OTHER SPECIFIED PARTS OF DIGESTIVE TRACT: Chronic | ICD-10-CM

## 2023-02-17 PROCEDURE — 77063 BREAST TOMOSYNTHESIS BI: CPT | Mod: 26

## 2023-02-17 PROCEDURE — 77067 SCR MAMMO BI INCL CAD: CPT

## 2023-02-17 PROCEDURE — 77067 SCR MAMMO BI INCL CAD: CPT | Mod: 26

## 2023-02-17 PROCEDURE — 77063 BREAST TOMOSYNTHESIS BI: CPT

## 2023-02-24 ENCOUNTER — APPOINTMENT (OUTPATIENT)
Dept: OBGYN | Facility: CLINIC | Age: 58
End: 2023-02-24
Payer: COMMERCIAL

## 2023-02-24 VITALS
HEIGHT: 63 IN | HEART RATE: 71 BPM | DIASTOLIC BLOOD PRESSURE: 81 MMHG | SYSTOLIC BLOOD PRESSURE: 130 MMHG | TEMPERATURE: 98.6 F | BODY MASS INDEX: 38.45 KG/M2 | WEIGHT: 217 LBS

## 2023-02-24 DIAGNOSIS — R10.2 PELVIC AND PERINEAL PAIN: ICD-10-CM

## 2023-02-24 DIAGNOSIS — Z87.19 PERSONAL HISTORY OF OTHER DISEASES OF THE DIGESTIVE SYSTEM: ICD-10-CM

## 2023-02-24 DIAGNOSIS — N89.8 OTHER SPECIFIED NONINFLAMMATORY DISORDERS OF VAGINA: ICD-10-CM

## 2023-02-24 LAB
BILIRUB UR QL STRIP: NORMAL
CLARITY UR: CLEAR
COLLECTION METHOD: NORMAL
GLUCOSE UR-MCNC: NORMAL
HCG UR QL: 0.2 EU/DL
HGB UR QL STRIP.AUTO: NORMAL
KETONES UR-MCNC: NORMAL
LEUKOCYTE ESTERASE UR QL STRIP: ABNORMAL
NITRITE UR QL STRIP: NORMAL
PH UR STRIP: 5
PROT UR STRIP-MCNC: NORMAL
SP GR UR STRIP: >=1.03

## 2023-02-24 PROCEDURE — 81003 URINALYSIS AUTO W/O SCOPE: CPT | Mod: QW

## 2023-02-24 PROCEDURE — 99213 OFFICE O/P EST LOW 20 MIN: CPT | Mod: 25

## 2023-02-24 PROCEDURE — 99396 PREV VISIT EST AGE 40-64: CPT

## 2023-02-24 RX ORDER — TERCONAZOLE 8 MG/G
0.8 CREAM VAGINAL
Qty: 1 | Refills: 1 | Status: ACTIVE | COMMUNITY
Start: 2023-02-24 | End: 1900-01-01

## 2023-02-24 RX ORDER — FLUCONAZOLE 150 MG/1
150 TABLET ORAL
Qty: 1 | Refills: 1 | Status: ACTIVE | COMMUNITY
Start: 2023-02-24 | End: 1900-01-01

## 2023-02-26 PROBLEM — Z87.19 HISTORY OF HIATAL HERNIA: Status: RESOLVED | Noted: 2023-02-26 | Resolved: 2023-02-26

## 2023-02-26 PROBLEM — N89.8 VAGINAL DISCHARGE: Status: ACTIVE | Noted: 2023-01-11

## 2023-02-26 PROBLEM — N89.8 VAGINAL ITCHING: Status: ACTIVE | Noted: 2023-01-11

## 2023-02-26 LAB — BACTERIA UR CULT: NORMAL

## 2023-02-26 NOTE — PHYSICAL EXAM
[Appropriately responsive] : appropriately responsive [Alert] : alert [No Acute Distress] : no acute distress [No Lymphadenopathy] : no lymphadenopathy [Regular Rate Rhythm] : regular rate rhythm [Soft] : soft [Non-tender] : non-tender [Non-distended] : non-distended [No Mass] : no mass [Oriented x3] : oriented x3 [Examination Of The Breasts] : a normal appearance [No Discharge] : no discharge [No Masses] : no breast masses were palpable [Vulvar Atrophy] : vulvar atrophy [Labia Majora] : normal [No Lesions] : no lesions  [Labia Minora] : normal [Normal] : normal [No Bleeding] : There was no active vaginal bleeding [Absent] : absent [FreeTextEntry4] : Minimal white discharge-vaginal culture done [FreeTextEntry6] : Exam limited to habitus, pain not reproducible on bimanual exam, no rebound or guarding, supine and upright no palpable hernia

## 2023-02-26 NOTE — HISTORY OF PRESENT ILLNESS
[Patient reported mammogram was normal] : Patient reported mammogram was normal [Patient reported PAP Smear was normal] : Patient reported PAP Smear was normal [Patient reported bone density results were normal] : Patient reported bone density results were normal [Patient reported colonoscopy was normal] : Patient reported colonoscopy was normal [unknown] : Patient is unsure of the date of her LMP [Menarche Age: ____] : age at menarche was [unfilled] [Currently In Menopause] : currently in menopause [Menopause Age: ____] : age at menopause was [unfilled] [Currently Active] : currently active [Men] : men [No] : No [Gonorrhea test offered] : Gonorrhea test offered [Chlamydia test offered] : Chlamydia test offered [Trichomonas test offered] : Trichomonas test offered [HPV test offered] : HPV test offered [postmenopausal] : postmenopausal [Y] : Patient is sexually active [N] : Patient denies prior pregnancies [FreeTextEntry1] : 56y/o P0 menopause at age 30, Pt is here for her annual exam, denies any postmenopausal bleeding but does state for 3 weeks has right side pelvic pain after starting to exercise, also states recently had recurrent yeast infections, vaginal irritation, discharge and occasional vaginal odor.   [TextBox_4] : 57-year-old G0 in menopause came for annual GYN exam and Pap smear.  Patient denies postmenopausal bleeding or dysuria.  She does complain of intermittent vaginal discharge with irritation and itching.  She states she feels she gets recurrent yeast infections.  She is unsure of her last hemoglobin A1c with her diabetes management.  She also complains of right-sided pelvic pain on and off for the last 3 weeks but she is unsure if it is related to her starting to increase her exercise regimen.  She also states since her last visit she was diagnosed with a hiatal hernia but was told they were not corrected until her gastric bypass is revised.  She denies history of abnormal Pap, HPV, STDs.  She does have a history of hysterectomy due to endometrial cancer at 30 years of age.  She is sexually active with 1 male partner.\par U dip: Trace leukocytes\par \par  [Mammogramdate] : 2/17/23 [PapSmeardate] : 2/2022 [BoneDensityDate] : 2021 [TextBox_37] : Will give referral [ColonoscopyDate] : 11/22  [TextBox_43] : f/u 5 yrs  [PGHxTotal] : 0

## 2023-02-26 NOTE — DISCUSSION/SUMMARY
[FreeTextEntry1] : A: 57-year-old for annual GYN exam, pelvic pain, history endometrial cancer, vaginal discharge/irritation, diabetes, BMI 38\par \par P: GYN exam done\par Pap smear done\par Urine culture sent\par Safe sex practices\par Genital culture done\par Terazol and Diflucan Rx given\par Referral for pelvic ultrasound given-we will consider CT if sonogram normal to rule out hernia\par Encourage healthy diet and exercise as well as increase hydration and decrease carbohydrate and glucose intake\par Encourage yearly mammogram will give referral as needed\par Follow-up after imaging or as needed\par

## 2023-02-28 LAB
CYTOLOGY CVX/VAG DOC THIN PREP: ABNORMAL
HPV HIGH+LOW RISK DNA PNL CVX: NOT DETECTED

## 2023-04-21 ENCOUNTER — APPOINTMENT (OUTPATIENT)
Dept: PULMONOLOGY | Facility: CLINIC | Age: 58
End: 2023-04-21

## 2023-06-22 ENCOUNTER — RESULT REVIEW (OUTPATIENT)
Age: 58
End: 2023-06-22

## 2023-06-22 ENCOUNTER — OUTPATIENT (OUTPATIENT)
Dept: OUTPATIENT SERVICES | Facility: HOSPITAL | Age: 58
LOS: 1 days | End: 2023-06-22
Payer: COMMERCIAL

## 2023-06-22 DIAGNOSIS — Z98.890 OTHER SPECIFIED POSTPROCEDURAL STATES: Chronic | ICD-10-CM

## 2023-06-22 DIAGNOSIS — N20.0 CALCULUS OF KIDNEY: ICD-10-CM

## 2023-06-22 DIAGNOSIS — Z90.710 ACQUIRED ABSENCE OF BOTH CERVIX AND UTERUS: Chronic | ICD-10-CM

## 2023-06-22 DIAGNOSIS — Z00.8 ENCOUNTER FOR OTHER GENERAL EXAMINATION: ICD-10-CM

## 2023-06-22 DIAGNOSIS — Z90.49 ACQUIRED ABSENCE OF OTHER SPECIFIED PARTS OF DIGESTIVE TRACT: Chronic | ICD-10-CM

## 2023-06-22 PROCEDURE — 72070 X-RAY EXAM THORAC SPINE 2VWS: CPT

## 2023-06-22 PROCEDURE — 71046 X-RAY EXAM CHEST 2 VIEWS: CPT | Mod: 26

## 2023-06-22 PROCEDURE — 76775 US EXAM ABDO BACK WALL LIM: CPT | Mod: 26

## 2023-06-22 PROCEDURE — 76775 US EXAM ABDO BACK WALL LIM: CPT

## 2023-06-22 PROCEDURE — 72070 X-RAY EXAM THORAC SPINE 2VWS: CPT | Mod: 26

## 2023-06-22 PROCEDURE — 71046 X-RAY EXAM CHEST 2 VIEWS: CPT

## 2023-06-23 DIAGNOSIS — N20.0 CALCULUS OF KIDNEY: ICD-10-CM

## 2023-07-19 ENCOUNTER — OUTPATIENT (OUTPATIENT)
Dept: OUTPATIENT SERVICES | Facility: HOSPITAL | Age: 58
LOS: 1 days | End: 2023-07-19
Payer: COMMERCIAL

## 2023-07-19 DIAGNOSIS — Z98.890 OTHER SPECIFIED POSTPROCEDURAL STATES: Chronic | ICD-10-CM

## 2023-07-19 DIAGNOSIS — Z90.710 ACQUIRED ABSENCE OF BOTH CERVIX AND UTERUS: Chronic | ICD-10-CM

## 2023-07-19 DIAGNOSIS — Z00.8 ENCOUNTER FOR OTHER GENERAL EXAMINATION: ICD-10-CM

## 2023-07-19 DIAGNOSIS — R91.8 OTHER NONSPECIFIC ABNORMAL FINDING OF LUNG FIELD: ICD-10-CM

## 2023-07-19 DIAGNOSIS — Z90.49 ACQUIRED ABSENCE OF OTHER SPECIFIED PARTS OF DIGESTIVE TRACT: Chronic | ICD-10-CM

## 2023-07-19 PROCEDURE — 71250 CT THORAX DX C-: CPT | Mod: 26

## 2023-07-19 PROCEDURE — 71250 CT THORAX DX C-: CPT

## 2023-07-20 DIAGNOSIS — R91.8 OTHER NONSPECIFIC ABNORMAL FINDING OF LUNG FIELD: ICD-10-CM

## 2023-08-18 NOTE — ASU PATIENT PROFILE, ADULT - NSTRANFUSIONPLAN_GEN_ALL_CORE_SIUH
1. Have you been to the ER, urgent care clinic since your last visit? Hospitalized since your last visit? No    2. Have you seen or consulted any other health care providers outside of the [address] [place] [address] since your last visit? Include any pap smears or colon screening.  No     Chief Complaint   Patient presents with    Diabetes    Hypertension    Cholesterol Problem         PHQ-9 Total Score: 0 (8/18/2023 10:53 AM) unknown

## 2023-08-21 ENCOUNTER — APPOINTMENT (OUTPATIENT)
Dept: UROLOGY | Facility: CLINIC | Age: 58
End: 2023-08-21

## 2023-09-28 NOTE — H&P PST ADULT - SKIN
No lesions; no rash Home Suture Removal Text: Patient was provided a home suture removal kit and will remove their sutures at home.  If they have any questions or difficulties they will call the office.

## 2023-10-05 ENCOUNTER — NON-APPOINTMENT (OUTPATIENT)
Age: 58
End: 2023-10-05

## 2023-10-10 ENCOUNTER — APPOINTMENT (OUTPATIENT)
Dept: PULMONOLOGY | Facility: CLINIC | Age: 58
End: 2023-10-10
Payer: COMMERCIAL

## 2023-10-10 VITALS
DIASTOLIC BLOOD PRESSURE: 76 MMHG | WEIGHT: 217 LBS | HEIGHT: 63 IN | OXYGEN SATURATION: 96 % | SYSTOLIC BLOOD PRESSURE: 124 MMHG | BODY MASS INDEX: 38.45 KG/M2 | HEART RATE: 88 BPM

## 2023-10-10 DIAGNOSIS — R05.9 COUGH, UNSPECIFIED: ICD-10-CM

## 2023-10-10 DIAGNOSIS — R09.82 POSTNASAL DRIP: ICD-10-CM

## 2023-10-10 DIAGNOSIS — K44.9 DIAPHRAGMATIC HERNIA W/OUT OBSTRUCTION OR GANGRENE: ICD-10-CM

## 2023-10-10 PROCEDURE — 99213 OFFICE O/P EST LOW 20 MIN: CPT

## 2023-10-10 RX ORDER — AZELASTINE HYDROCHLORIDE 137 UG/1
0.1 SPRAY, METERED NASAL DAILY
Qty: 1 | Refills: 5 | Status: ACTIVE | COMMUNITY
Start: 2023-10-10 | End: 1900-01-01

## 2023-10-10 RX ORDER — FLUTICASONE PROPIONATE 50 UG/1
50 SPRAY, METERED NASAL
Qty: 1 | Refills: 5 | Status: ACTIVE | COMMUNITY
Start: 2023-10-10 | End: 1900-01-01

## 2023-12-26 ENCOUNTER — APPOINTMENT (OUTPATIENT)
Dept: ORTHOPEDIC SURGERY | Facility: CLINIC | Age: 58
End: 2023-12-26
Payer: COMMERCIAL

## 2023-12-26 VITALS — BODY MASS INDEX: 37.05 KG/M2 | WEIGHT: 217 LBS | HEIGHT: 64 IN

## 2023-12-26 DIAGNOSIS — M18.11 UNILATERAL PRIMARY OSTEOARTHRITIS OF FIRST CARPOMETACARPAL JOINT, RIGHT HAND: ICD-10-CM

## 2023-12-26 PROCEDURE — 99203 OFFICE O/P NEW LOW 30 MIN: CPT

## 2023-12-26 NOTE — ASSESSMENT
[FreeTextEntry1] : 58-year-old female right-sided basal joint arthritis.  Natural history of this condition was discussed with the patient.  Cortisone injections were discussed.  Her sugar has been running high just does not want to get a cortisone injection today.  Possibility of going back in the future to get injection was discussed the surgical intervention was discussed bracing was discussed as well and also figuring out whether or not she can take anti-inflammatories was discussed as well she will see us back in a couple weeks possibly to get injection

## 2023-12-26 NOTE — HISTORY OF PRESENT ILLNESS
[de-identified] : 58-year-old female comes in with pain discomfort in her right thumb.  Denies any numbness and tingling to fingers.  Just has pain discomfort in the right thumb and she comes in for evaluation today.  She does report that she has an allergy to anti-inflammatories like Motrin.  She is not sure of an allergy to all anti-inflammatories.

## 2024-01-08 ENCOUNTER — NON-APPOINTMENT (OUTPATIENT)
Age: 59
End: 2024-01-08

## 2024-01-09 RX ORDER — FLUCONAZOLE 150 MG/1
150 TABLET ORAL
Qty: 1 | Refills: 1 | Status: ACTIVE | COMMUNITY
Start: 2024-01-09

## 2024-02-06 ENCOUNTER — APPOINTMENT (OUTPATIENT)
Dept: ORTHOPEDIC SURGERY | Facility: CLINIC | Age: 59
End: 2024-02-06

## 2024-02-20 ENCOUNTER — APPOINTMENT (OUTPATIENT)
Age: 59
End: 2024-02-20
Payer: COMMERCIAL

## 2024-02-20 ENCOUNTER — OUTPATIENT (OUTPATIENT)
Dept: OUTPATIENT SERVICES | Facility: HOSPITAL | Age: 59
LOS: 1 days | End: 2024-02-20
Payer: COMMERCIAL

## 2024-02-20 ENCOUNTER — RESULT REVIEW (OUTPATIENT)
Age: 59
End: 2024-02-20

## 2024-02-20 DIAGNOSIS — Z90.710 ACQUIRED ABSENCE OF BOTH CERVIX AND UTERUS: Chronic | ICD-10-CM

## 2024-02-20 DIAGNOSIS — Z90.49 ACQUIRED ABSENCE OF OTHER SPECIFIED PARTS OF DIGESTIVE TRACT: Chronic | ICD-10-CM

## 2024-02-20 DIAGNOSIS — Z12.31 ENCOUNTER FOR SCREENING MAMMOGRAM FOR MALIGNANT NEOPLASM OF BREAST: ICD-10-CM

## 2024-02-20 DIAGNOSIS — Z98.890 OTHER SPECIFIED POSTPROCEDURAL STATES: Chronic | ICD-10-CM

## 2024-02-20 PROCEDURE — 77063 BREAST TOMOSYNTHESIS BI: CPT | Mod: 26

## 2024-02-20 PROCEDURE — 77067 SCR MAMMO BI INCL CAD: CPT | Mod: 26

## 2024-02-20 PROCEDURE — 77067 SCR MAMMO BI INCL CAD: CPT

## 2024-02-20 PROCEDURE — 77063 BREAST TOMOSYNTHESIS BI: CPT

## 2024-02-21 DIAGNOSIS — Z12.31 ENCOUNTER FOR SCREENING MAMMOGRAM FOR MALIGNANT NEOPLASM OF BREAST: ICD-10-CM

## 2024-03-13 NOTE — ED ADULT NURSE NOTE - IN THE PAST 12 MONTHS HAVE YOU USED DRUGS OTHER THAN THOSE REQUIRED FOR MEDICAL REASON?
----- Message from Eduarda Hoffman MD sent at 3/13/2024  8:41 AM EDT -----  Patient has uncontrolled labs has an apt this week will adjust meds at that visit   
Spoke with the patient about his lab results and he wanted to see if the doctor. Would put him in a referral to Endocrinology, I let him know I would have to ask the doctor and see if she would put the referral. In for him now, or tell him to wait for his follow up apt on 3/28.  
No

## 2024-03-18 ENCOUNTER — APPOINTMENT (OUTPATIENT)
Dept: OBGYN | Facility: CLINIC | Age: 59
End: 2024-03-18
Payer: COMMERCIAL

## 2024-03-18 VITALS
HEIGHT: 64 IN | TEMPERATURE: 98.6 F | SYSTOLIC BLOOD PRESSURE: 130 MMHG | WEIGHT: 218 LBS | DIASTOLIC BLOOD PRESSURE: 82 MMHG | BODY MASS INDEX: 37.22 KG/M2 | HEART RATE: 83 BPM

## 2024-03-18 DIAGNOSIS — Z85.42 PERSONAL HISTORY OF MALIGNANT NEOPLASM OF OTHER PARTS OF UTERUS: ICD-10-CM

## 2024-03-18 DIAGNOSIS — Z12.72 ENCOUNTER FOR SCREENING FOR MALIGNANT NEOPLASM OF VAGINA: ICD-10-CM

## 2024-03-18 DIAGNOSIS — E66.9 OBESITY, UNSPECIFIED: ICD-10-CM

## 2024-03-18 DIAGNOSIS — N95.2 POSTMENOPAUSAL ATROPHIC VAGINITIS: ICD-10-CM

## 2024-03-18 DIAGNOSIS — Z01.419 ENCOUNTER FOR GYNECOLOGICAL EXAMINATION (GENERAL) (ROUTINE) W/OUT ABNORMAL FINDINGS: ICD-10-CM

## 2024-03-18 DIAGNOSIS — Z71.89 OTHER SPECIFIED COUNSELING: ICD-10-CM

## 2024-03-18 DIAGNOSIS — E28.319 ASYMPTOMATIC PREMATURE MENOPAUSE: ICD-10-CM

## 2024-03-18 PROCEDURE — 99396 PREV VISIT EST AGE 40-64: CPT

## 2024-03-20 LAB
C TRACH RRNA SPEC QL NAA+PROBE: NOT DETECTED
CYTOLOGY CVX/VAG DOC THIN PREP: ABNORMAL
HPV HIGH+LOW RISK DNA PNL CVX: NOT DETECTED
N GONORRHOEA RRNA SPEC QL NAA+PROBE: NOT DETECTED
SOURCE AMPLIFICATION: NORMAL
SOURCE AMPLIFICATION: NORMAL
T VAGINALIS RRNA SPEC QL NAA+PROBE: NOT DETECTED

## 2024-03-27 ENCOUNTER — OUTPATIENT (OUTPATIENT)
Dept: OUTPATIENT SERVICES | Facility: HOSPITAL | Age: 59
LOS: 1 days | End: 2024-03-27
Payer: COMMERCIAL

## 2024-03-27 ENCOUNTER — RESULT REVIEW (OUTPATIENT)
Age: 59
End: 2024-03-27

## 2024-03-27 DIAGNOSIS — Z98.890 OTHER SPECIFIED POSTPROCEDURAL STATES: Chronic | ICD-10-CM

## 2024-03-27 DIAGNOSIS — Z13.820 ENCOUNTER FOR SCREENING FOR OSTEOPOROSIS: ICD-10-CM

## 2024-03-27 DIAGNOSIS — Z00.8 ENCOUNTER FOR OTHER GENERAL EXAMINATION: ICD-10-CM

## 2024-03-27 DIAGNOSIS — Z90.49 ACQUIRED ABSENCE OF OTHER SPECIFIED PARTS OF DIGESTIVE TRACT: Chronic | ICD-10-CM

## 2024-03-27 DIAGNOSIS — Z90.710 ACQUIRED ABSENCE OF BOTH CERVIX AND UTERUS: Chronic | ICD-10-CM

## 2024-03-27 PROCEDURE — 77080 DXA BONE DENSITY AXIAL: CPT | Mod: 26

## 2024-03-27 PROCEDURE — 77080 DXA BONE DENSITY AXIAL: CPT

## 2024-03-28 DIAGNOSIS — Z13.820 ENCOUNTER FOR SCREENING FOR OSTEOPOROSIS: ICD-10-CM

## 2024-03-30 PROBLEM — E66.9 OBESITY (BMI 30-39.9): Status: ACTIVE | Noted: 2023-02-24

## 2024-03-30 PROBLEM — N95.2 ATROPHIC VULVOVAGINITIS: Status: ACTIVE | Noted: 2022-08-23

## 2024-03-30 PROBLEM — Z71.89 OTHER SPECIFIED COUNSELING: Status: ACTIVE | Noted: 2021-02-21

## 2024-03-30 PROBLEM — Z12.72 VAGINAL PAP SMEAR: Status: ACTIVE | Noted: 2023-02-24

## 2024-03-30 PROBLEM — Z85.42 HISTORY OF ENDOMETRIAL CANCER: Status: ACTIVE | Noted: 2020-03-04

## 2024-03-30 NOTE — PHYSICAL EXAM
[Chaperone Declined] : Patient declined chaperone [Appropriately responsive] : appropriately responsive [Alert] : alert [No Acute Distress] : no acute distress [No Lymphadenopathy] : no lymphadenopathy [Regular Rate Rhythm] : regular rate rhythm [Clear to Auscultation B/L] : clear to auscultation bilaterally [Soft] : soft [Non-distended] : non-distended [Non-tender] : non-tender [No Mass] : no mass [Oriented x3] : oriented x3 [Examination Of The Breasts] : a normal appearance [No Discharge] : no discharge [No Masses] : no breast masses were palpable [Vulvar Atrophy] : vulvar atrophy [No Lesions] : no lesions  [Labia Majora] : normal [Labia Minora] : normal [Atrophy] : atrophy [Normal] : normal [No Bleeding] : There was no active vaginal bleeding [Absent] : absent [FreeTextEntry6] : exam limited of habitus, no masses or pain noted on bimanual exam [FreeTextEntry4] : pap smear done of cuff

## 2024-03-30 NOTE — DISCUSSION/SUMMARY
[FreeTextEntry1] : A: 57yo for annual GYN Exam, vulvovaginal atrophy, BMI 37  P: GYN exam done      pap smear done      safe sex practices     pain and bleeding precautions     referral for mammogram given     referral for DEXA given      encourage healthy diet and exercise      f/u for routine exam or PRN

## 2024-03-30 NOTE — HISTORY OF PRESENT ILLNESS
[Patient reported PAP Smear was normal] : Patient reported PAP Smear was normal [LMP unknown] : LMP unknown [postmenopausal] : postmenopausal [Y] : Patient is sexually active [N] : Patient denies prior pregnancies [unknown] : Patient is unsure of the date of her LMP [Menarche Age: ____] : age at menarche was [unfilled] [Currently In Menopause] : currently in menopause [Menopause Age: ____] : age at menopause was [unfilled] [Currently Active] : currently active [No] : No [Women] : women [FreeTextEntry1] : 57y/o P0 menopause at age 30, Pt is here for her annual exam, denies any pelvic pain or postmenopausal bleeding. Pt does state that last week she had yellowish discharge, vaginal odor, and took a Diflucan, symptoms subsided. [Patient reported bone density results were normal] : Patient reported bone density results were normal [Chlamydia test offered] : Chlamydia test offered [Gonorrhea test offered] : Gonorrhea test offered [Trichomonas test offered] : Trichomonas test offered [HPV test offered] : HPV test offered [Mammogramdate] : 2024 [TextBox_4] : 58-year-old G0 in menopause came for annual GYN exam and Pap smear. Patient denies postmenopausal bleeding, pelvic pain, discharge, dysuria or other GYN complaints. She had sx of yeast infection last week and took diflucan and her sx resolved.  She denies history of abnormal Pap, HPV, STDs. She does have a history of hysterectomy due to endometrial cancer at 30 years of age. She is sexually active with 1 female partner. [TextBox_19] : will give referral [BoneDensityDate] : 2021 [PapSmeardate] : 2/27/2023 [PGHxTotal] : 0 [TextBox_37] : will give referral

## 2024-03-30 NOTE — COUNSELING
[Nutrition/ Exercise/ Weight Management] : nutrition, exercise, weight management [Breast Self Exam] : breast self exam [Vitamins/Supplements] : vitamins/supplements [Bladder Hygiene] : bladder hygiene [Contraception/ Emergency Contraception/ Safe Sexual Practices] : contraception, emergency contraception, safe sexual practices [STD (testing, results, tx)] : STD (testing, results, tx) [Lab Results] : lab results [Medication Management] : medication management

## 2024-04-04 ENCOUNTER — NON-APPOINTMENT (OUTPATIENT)
Age: 59
End: 2024-04-04

## 2024-05-15 ENCOUNTER — APPOINTMENT (OUTPATIENT)
Dept: PULMONOLOGY | Facility: CLINIC | Age: 59
End: 2024-05-15
Payer: COMMERCIAL

## 2024-05-15 VITALS
SYSTOLIC BLOOD PRESSURE: 130 MMHG | BODY MASS INDEX: 37.22 KG/M2 | WEIGHT: 218 LBS | HEART RATE: 67 BPM | RESPIRATION RATE: 14 BRPM | HEIGHT: 64 IN | DIASTOLIC BLOOD PRESSURE: 80 MMHG | OXYGEN SATURATION: 97 %

## 2024-05-15 DIAGNOSIS — G47.33 OBSTRUCTIVE SLEEP APNEA (ADULT) (PEDIATRIC): ICD-10-CM

## 2024-05-15 DIAGNOSIS — R06.02 SHORTNESS OF BREATH: ICD-10-CM

## 2024-05-15 DIAGNOSIS — R05.3 CHRONIC COUGH: ICD-10-CM

## 2024-05-15 DIAGNOSIS — R09.82 POSTNASAL DRIP: ICD-10-CM

## 2024-05-15 PROCEDURE — G2211 COMPLEX E/M VISIT ADD ON: CPT

## 2024-05-15 PROCEDURE — 99213 OFFICE O/P EST LOW 20 MIN: CPT

## 2024-05-15 NOTE — REASON FOR VISIT
[Follow-Up] : a follow-up visit [Abnormal CXR/ Chest CT] : an abnormal CXR/ chest CT [Asthma] : asthma [Sleep Apnea] : sleep apnea [Cough] : cough [Shortness of Breath] : shortness of breath [Pulmonary Nodules] : pulmonary nodules

## 2024-05-15 NOTE — DISCUSSION/SUMMARY
[FreeTextEntry1] : COUGH/ PND HAAD NASAL SPRAYS WEIGHT LOSS MULTIPLE LUNG NODULES STABLE SO FALL 9/11 EXPOSURE

## 2024-06-07 ENCOUNTER — INPATIENT (INPATIENT)
Facility: HOSPITAL | Age: 59
LOS: 3 days | Discharge: HOME HEALTH | DRG: 322 | End: 2024-06-11
Attending: INTERNAL MEDICINE | Admitting: STUDENT IN AN ORGANIZED HEALTH CARE EDUCATION/TRAINING PROGRAM
Payer: COMMERCIAL

## 2024-06-07 ENCOUNTER — TRANSCRIPTION ENCOUNTER (OUTPATIENT)
Age: 59
End: 2024-06-07

## 2024-06-07 VITALS
WEIGHT: 218.04 LBS | SYSTOLIC BLOOD PRESSURE: 188 MMHG | OXYGEN SATURATION: 98 % | TEMPERATURE: 98 F | RESPIRATION RATE: 18 BRPM | HEART RATE: 101 BPM | DIASTOLIC BLOOD PRESSURE: 101 MMHG

## 2024-06-07 DIAGNOSIS — Z98.890 OTHER SPECIFIED POSTPROCEDURAL STATES: Chronic | ICD-10-CM

## 2024-06-07 DIAGNOSIS — Z90.49 ACQUIRED ABSENCE OF OTHER SPECIFIED PARTS OF DIGESTIVE TRACT: Chronic | ICD-10-CM

## 2024-06-07 DIAGNOSIS — M54.9 DORSALGIA, UNSPECIFIED: ICD-10-CM

## 2024-06-07 DIAGNOSIS — Z90.710 ACQUIRED ABSENCE OF BOTH CERVIX AND UTERUS: Chronic | ICD-10-CM

## 2024-06-07 LAB
ALBUMIN SERPL ELPH-MCNC: 4.5 G/DL — SIGNIFICANT CHANGE UP (ref 3.5–5.2)
ALP SERPL-CCNC: 85 U/L — SIGNIFICANT CHANGE UP (ref 30–115)
ALT FLD-CCNC: 36 U/L — SIGNIFICANT CHANGE UP (ref 0–41)
ANION GAP SERPL CALC-SCNC: 18 MMOL/L — HIGH (ref 7–14)
AST SERPL-CCNC: 32 U/L — SIGNIFICANT CHANGE UP (ref 0–41)
BASOPHILS # BLD AUTO: 0.02 K/UL — SIGNIFICANT CHANGE UP (ref 0–0.2)
BASOPHILS NFR BLD AUTO: 0.1 % — SIGNIFICANT CHANGE UP (ref 0–1)
BILIRUB SERPL-MCNC: 0.4 MG/DL — SIGNIFICANT CHANGE UP (ref 0.2–1.2)
BUN SERPL-MCNC: 23 MG/DL — HIGH (ref 10–20)
CALCIUM SERPL-MCNC: 10 MG/DL — SIGNIFICANT CHANGE UP (ref 8.4–10.4)
CHLORIDE SERPL-SCNC: 99 MMOL/L — SIGNIFICANT CHANGE UP (ref 98–110)
CHOLEST SERPL-MCNC: 204 MG/DL — HIGH
CO2 SERPL-SCNC: 18 MMOL/L — SIGNIFICANT CHANGE UP (ref 17–32)
CREAT SERPL-MCNC: 1 MG/DL — SIGNIFICANT CHANGE UP (ref 0.7–1.5)
EGFR: 64 ML/MIN/1.73M2 — SIGNIFICANT CHANGE UP
EOSINOPHIL # BLD AUTO: 0 K/UL — SIGNIFICANT CHANGE UP (ref 0–0.7)
EOSINOPHIL NFR BLD AUTO: 0 % — SIGNIFICANT CHANGE UP (ref 0–8)
GLUCOSE BLDC GLUCOMTR-MCNC: 246 MG/DL — HIGH (ref 70–99)
GLUCOSE BLDC GLUCOMTR-MCNC: 259 MG/DL — HIGH (ref 70–99)
GLUCOSE BLDC GLUCOMTR-MCNC: 277 MG/DL — HIGH (ref 70–99)
GLUCOSE BLDC GLUCOMTR-MCNC: 292 MG/DL — HIGH (ref 70–99)
GLUCOSE SERPL-MCNC: 281 MG/DL — HIGH (ref 70–99)
HCT VFR BLD CALC: 41.7 % — SIGNIFICANT CHANGE UP (ref 37–47)
HDLC SERPL-MCNC: 68 MG/DL — SIGNIFICANT CHANGE UP
HGB BLD-MCNC: 14.2 G/DL — SIGNIFICANT CHANGE UP (ref 12–16)
IMM GRANULOCYTES NFR BLD AUTO: 0.4 % — HIGH (ref 0.1–0.3)
LIPID PNL WITH DIRECT LDL SERPL: 112 MG/DL — HIGH
LYMPHOCYTES # BLD AUTO: 1.02 K/UL — LOW (ref 1.2–3.4)
LYMPHOCYTES # BLD AUTO: 6.5 % — LOW (ref 20.5–51.1)
MCHC RBC-ENTMCNC: 28.1 PG — SIGNIFICANT CHANGE UP (ref 27–31)
MCHC RBC-ENTMCNC: 34.1 G/DL — SIGNIFICANT CHANGE UP (ref 32–37)
MCV RBC AUTO: 82.4 FL — SIGNIFICANT CHANGE UP (ref 81–99)
MONOCYTES # BLD AUTO: 0.42 K/UL — SIGNIFICANT CHANGE UP (ref 0.1–0.6)
MONOCYTES NFR BLD AUTO: 2.7 % — SIGNIFICANT CHANGE UP (ref 1.7–9.3)
NEUTROPHILS # BLD AUTO: 14.09 K/UL — HIGH (ref 1.4–6.5)
NEUTROPHILS NFR BLD AUTO: 90.3 % — HIGH (ref 42.2–75.2)
NON HDL CHOLESTEROL: 136 MG/DL — HIGH
NRBC # BLD: 0 /100 WBCS — SIGNIFICANT CHANGE UP (ref 0–0)
PLATELET # BLD AUTO: 274 K/UL — SIGNIFICANT CHANGE UP (ref 130–400)
PMV BLD: 10.2 FL — SIGNIFICANT CHANGE UP (ref 7.4–10.4)
POTASSIUM SERPL-MCNC: 5 MMOL/L — SIGNIFICANT CHANGE UP (ref 3.5–5)
POTASSIUM SERPL-SCNC: 5 MMOL/L — SIGNIFICANT CHANGE UP (ref 3.5–5)
PROT SERPL-MCNC: 7.2 G/DL — SIGNIFICANT CHANGE UP (ref 6–8)
RBC # BLD: 5.06 M/UL — SIGNIFICANT CHANGE UP (ref 4.2–5.4)
RBC # FLD: 13.6 % — SIGNIFICANT CHANGE UP (ref 11.5–14.5)
SODIUM SERPL-SCNC: 135 MMOL/L — SIGNIFICANT CHANGE UP (ref 135–146)
TRIGL SERPL-MCNC: 120 MG/DL — SIGNIFICANT CHANGE UP
TROPONIN T, HIGH SENSITIVITY RESULT: 125 NG/L — CRITICAL HIGH (ref 6–13)
TROPONIN T, HIGH SENSITIVITY RESULT: 140 NG/L — CRITICAL HIGH (ref 6–13)
TROPONIN T, HIGH SENSITIVITY RESULT: 32 NG/L — HIGH (ref 6–13)
TROPONIN T, HIGH SENSITIVITY RESULT: 56 NG/L — CRITICAL HIGH (ref 6–13)
WBC # BLD: 15.62 K/UL — HIGH (ref 4.8–10.8)
WBC # FLD AUTO: 15.62 K/UL — HIGH (ref 4.8–10.8)

## 2024-06-07 PROCEDURE — 85730 THROMBOPLASTIN TIME PARTIAL: CPT

## 2024-06-07 PROCEDURE — C9600: CPT | Mod: LC

## 2024-06-07 PROCEDURE — 93017 CV STRESS TEST TRACING ONLY: CPT

## 2024-06-07 PROCEDURE — 83735 ASSAY OF MAGNESIUM: CPT

## 2024-06-07 PROCEDURE — 80053 COMPREHEN METABOLIC PANEL: CPT

## 2024-06-07 PROCEDURE — 36415 COLL VENOUS BLD VENIPUNCTURE: CPT

## 2024-06-07 PROCEDURE — 80048 BASIC METABOLIC PNL TOTAL CA: CPT

## 2024-06-07 PROCEDURE — 86900 BLOOD TYPING SEROLOGIC ABO: CPT

## 2024-06-07 PROCEDURE — 85027 COMPLETE CBC AUTOMATED: CPT

## 2024-06-07 PROCEDURE — C1887: CPT

## 2024-06-07 PROCEDURE — C1894: CPT

## 2024-06-07 PROCEDURE — 85025 COMPLETE CBC W/AUTO DIFF WBC: CPT

## 2024-06-07 PROCEDURE — 82962 GLUCOSE BLOOD TEST: CPT

## 2024-06-07 PROCEDURE — 99223 1ST HOSP IP/OBS HIGH 75: CPT

## 2024-06-07 PROCEDURE — C1874: CPT

## 2024-06-07 PROCEDURE — 93005 ELECTROCARDIOGRAM TRACING: CPT

## 2024-06-07 PROCEDURE — A9500: CPT

## 2024-06-07 PROCEDURE — 93010 ELECTROCARDIOGRAM REPORT: CPT | Mod: 77

## 2024-06-07 PROCEDURE — 84484 ASSAY OF TROPONIN QUANT: CPT

## 2024-06-07 PROCEDURE — 93010 ELECTROCARDIOGRAM REPORT: CPT

## 2024-06-07 PROCEDURE — 71045 X-RAY EXAM CHEST 1 VIEW: CPT | Mod: 26

## 2024-06-07 PROCEDURE — 70450 CT HEAD/BRAIN W/O DYE: CPT | Mod: 26,MC

## 2024-06-07 PROCEDURE — 86901 BLOOD TYPING SEROLOGIC RH(D): CPT

## 2024-06-07 PROCEDURE — C1725: CPT

## 2024-06-07 PROCEDURE — 83036 HEMOGLOBIN GLYCOSYLATED A1C: CPT

## 2024-06-07 PROCEDURE — 78452 HT MUSCLE IMAGE SPECT MULT: CPT | Mod: MC

## 2024-06-07 PROCEDURE — 93458 L HRT ARTERY/VENTRICLE ANGIO: CPT | Mod: 59

## 2024-06-07 PROCEDURE — 86850 RBC ANTIBODY SCREEN: CPT

## 2024-06-07 PROCEDURE — 85610 PROTHROMBIN TIME: CPT

## 2024-06-07 PROCEDURE — C9601: CPT | Mod: LC

## 2024-06-07 PROCEDURE — 80061 LIPID PANEL: CPT

## 2024-06-07 PROCEDURE — 93306 TTE W/DOPPLER COMPLETE: CPT

## 2024-06-07 PROCEDURE — C1769: CPT

## 2024-06-07 PROCEDURE — 99285 EMERGENCY DEPT VISIT HI MDM: CPT

## 2024-06-07 RX ORDER — OMEPRAZOLE 10 MG/1
1 CAPSULE, DELAYED RELEASE ORAL
Refills: 0 | DISCHARGE

## 2024-06-07 RX ORDER — INSULIN NPH HUM/REG INSULIN HM 70-30/ML
0 VIAL (ML) SUBCUTANEOUS
Qty: 0 | Refills: 0 | DISCHARGE

## 2024-06-07 RX ORDER — DEXTROSE 10 % IN WATER 10 %
125 INTRAVENOUS SOLUTION INTRAVENOUS ONCE
Refills: 0 | Status: DISCONTINUED | OUTPATIENT
Start: 2024-06-07 | End: 2024-06-07

## 2024-06-07 RX ORDER — NEBIVOLOL HYDROCHLORIDE 5 MG/1
1 TABLET ORAL
Qty: 0 | Refills: 0 | DISCHARGE

## 2024-06-07 RX ORDER — NEBIVOLOL HYDROCHLORIDE 5 MG/1
5 TABLET ORAL AT BEDTIME
Refills: 0 | Status: DISCONTINUED | OUTPATIENT
Start: 2024-06-07 | End: 2024-06-11

## 2024-06-07 RX ORDER — DEXTROSE 50 % IN WATER 50 %
12.5 SYRINGE (ML) INTRAVENOUS ONCE
Refills: 0 | Status: DISCONTINUED | OUTPATIENT
Start: 2024-06-07 | End: 2024-06-11

## 2024-06-07 RX ORDER — METFORMIN HYDROCHLORIDE 850 MG/1
1 TABLET ORAL
Qty: 0 | Refills: 0 | DISCHARGE

## 2024-06-07 RX ORDER — CLOPIDOGREL BISULFATE 75 MG/1
300 TABLET, FILM COATED ORAL ONCE
Refills: 0 | Status: COMPLETED | OUTPATIENT
Start: 2024-06-07 | End: 2024-06-07

## 2024-06-07 RX ORDER — SODIUM CHLORIDE 9 MG/ML
1000 INJECTION, SOLUTION INTRAVENOUS
Refills: 0 | Status: DISCONTINUED | OUTPATIENT
Start: 2024-06-07 | End: 2024-06-07

## 2024-06-07 RX ORDER — LOSARTAN POTASSIUM 100 MG/1
50 TABLET, FILM COATED ORAL EVERY 12 HOURS
Refills: 0 | Status: DISCONTINUED | OUTPATIENT
Start: 2024-06-07 | End: 2024-06-11

## 2024-06-07 RX ORDER — PANTOPRAZOLE SODIUM 20 MG/1
40 TABLET, DELAYED RELEASE ORAL EVERY 12 HOURS
Refills: 0 | Status: DISCONTINUED | OUTPATIENT
Start: 2024-06-07 | End: 2024-06-11

## 2024-06-07 RX ORDER — INSULIN LISPRO 100/ML
22 VIAL (ML) SUBCUTANEOUS
Refills: 0 | Status: DISCONTINUED | OUTPATIENT
Start: 2024-06-07 | End: 2024-06-11

## 2024-06-07 RX ORDER — DEXTROSE 50 % IN WATER 50 %
25 SYRINGE (ML) INTRAVENOUS ONCE
Refills: 0 | Status: DISCONTINUED | OUTPATIENT
Start: 2024-06-07 | End: 2024-06-11

## 2024-06-07 RX ORDER — ACETAMINOPHEN 500 MG
975 TABLET ORAL ONCE
Refills: 0 | Status: COMPLETED | OUTPATIENT
Start: 2024-06-07 | End: 2024-06-07

## 2024-06-07 RX ORDER — REGADENOSON 0.08 MG/ML
0.4 INJECTION, SOLUTION INTRAVENOUS ONCE
Refills: 0 | Status: DISCONTINUED | OUTPATIENT
Start: 2024-06-07 | End: 2024-06-11

## 2024-06-07 RX ORDER — LOSARTAN POTASSIUM 100 MG/1
0 TABLET, FILM COATED ORAL
Qty: 0 | Refills: 0 | DISCHARGE

## 2024-06-07 RX ORDER — ENOXAPARIN SODIUM 100 MG/ML
40 INJECTION SUBCUTANEOUS EVERY 24 HOURS
Refills: 0 | Status: DISCONTINUED | OUTPATIENT
Start: 2024-06-07 | End: 2024-06-11

## 2024-06-07 RX ORDER — FAMOTIDINE 10 MG/ML
20 INJECTION INTRAVENOUS ONCE
Refills: 0 | Status: COMPLETED | OUTPATIENT
Start: 2024-06-07 | End: 2024-06-07

## 2024-06-07 RX ORDER — INSULIN DETEMIR 100/ML (3)
50 INSULIN PEN (ML) SUBCUTANEOUS
Refills: 0 | DISCHARGE

## 2024-06-07 RX ORDER — METOPROLOL TARTRATE 50 MG
50 TABLET ORAL EVERY 12 HOURS
Refills: 0 | Status: DISCONTINUED | OUTPATIENT
Start: 2024-06-07 | End: 2024-06-07

## 2024-06-07 RX ORDER — INSULIN LISPRO 100/ML
VIAL (ML) SUBCUTANEOUS
Refills: 0 | Status: DISCONTINUED | OUTPATIENT
Start: 2024-06-07 | End: 2024-06-11

## 2024-06-07 RX ORDER — METOPROLOL TARTRATE 50 MG
100 TABLET ORAL EVERY 12 HOURS
Refills: 0 | Status: DISCONTINUED | OUTPATIENT
Start: 2024-06-07 | End: 2024-06-07

## 2024-06-07 RX ORDER — GLUCAGON INJECTION, SOLUTION 0.5 MG/.1ML
1 INJECTION, SOLUTION SUBCUTANEOUS ONCE
Refills: 0 | Status: DISCONTINUED | OUTPATIENT
Start: 2024-06-07 | End: 2024-06-07

## 2024-06-07 RX ORDER — DEXTROSE 50 % IN WATER 50 %
15 SYRINGE (ML) INTRAVENOUS ONCE
Refills: 0 | Status: DISCONTINUED | OUTPATIENT
Start: 2024-06-07 | End: 2024-06-11

## 2024-06-07 RX ORDER — INSULIN GLARGINE 100 [IU]/ML
50 INJECTION, SOLUTION SUBCUTANEOUS AT BEDTIME
Refills: 0 | Status: DISCONTINUED | OUTPATIENT
Start: 2024-06-07 | End: 2024-06-08

## 2024-06-07 RX ORDER — INSULIN ASPART 100 [IU]/ML
22 INJECTION, SOLUTION SUBCUTANEOUS
Refills: 0 | DISCHARGE

## 2024-06-07 RX ORDER — INSULIN DETEMIR 100/ML (3)
0 INSULIN PEN (ML) SUBCUTANEOUS
Qty: 0 | Refills: 0 | DISCHARGE

## 2024-06-07 RX ORDER — METOPROLOL TARTRATE 50 MG
12.5 TABLET ORAL
Refills: 0 | Status: DISCONTINUED | OUTPATIENT
Start: 2024-06-07 | End: 2024-06-07

## 2024-06-07 RX ORDER — LOSARTAN POTASSIUM 100 MG/1
1 TABLET, FILM COATED ORAL
Refills: 0 | DISCHARGE

## 2024-06-07 RX ADMIN — Medication 975 MILLIGRAM(S): at 02:38

## 2024-06-07 RX ADMIN — NEBIVOLOL HYDROCHLORIDE 5 MILLIGRAM(S): 5 TABLET ORAL at 21:44

## 2024-06-07 RX ADMIN — Medication 3: at 16:53

## 2024-06-07 RX ADMIN — LOSARTAN POTASSIUM 50 MILLIGRAM(S): 100 TABLET, FILM COATED ORAL at 17:51

## 2024-06-07 RX ADMIN — Medication 22 UNIT(S): at 16:54

## 2024-06-07 RX ADMIN — INSULIN GLARGINE 50 UNIT(S): 100 INJECTION, SOLUTION SUBCUTANEOUS at 21:44

## 2024-06-07 RX ADMIN — PANTOPRAZOLE SODIUM 40 MILLIGRAM(S): 20 TABLET, DELAYED RELEASE ORAL at 17:53

## 2024-06-07 RX ADMIN — FAMOTIDINE 100 MILLIGRAM(S): 10 INJECTION INTRAVENOUS at 02:41

## 2024-06-07 RX ADMIN — CLOPIDOGREL BISULFATE 300 MILLIGRAM(S): 75 TABLET, FILM COATED ORAL at 06:32

## 2024-06-07 RX ADMIN — Medication 22 UNIT(S): at 12:13

## 2024-06-07 RX ADMIN — ENOXAPARIN SODIUM 40 MILLIGRAM(S): 100 INJECTION SUBCUTANEOUS at 12:15

## 2024-06-07 NOTE — DISCHARGE NOTE NURSING/CASE MANAGEMENT/SOCIAL WORK - PATIENT PORTAL LINK FT
You can access the FollowMyHealth Patient Portal offered by [facility] by registering at the following website: [email or web address]. By joining [facility]’s FollowMyHealth portal, you will also be able to view your health information using other applications (apps) compatible with our system.

## 2024-06-07 NOTE — CONSULT NOTE ADULT - ASSESSMENT
Atypical chest pain with DM  patient need CCTA but very allergic to dye with anaphylactic shock  last calcium score was zero 2 weeks ago  after discussion with medical team, will go for a nuclear stress test.

## 2024-06-07 NOTE — PATIENT PROFILE ADULT - FALL HARM RISK - HARM RISK INTERVENTIONS

## 2024-06-07 NOTE — ED PROVIDER NOTE - CLINICAL SUMMARY MEDICAL DECISION MAKING FREE TEXT BOX
58-year-old female, past medical history of hypertension, diabetes, gastric ulcer, presenting with chest burning that has been ongoing intermittently for 4 days, no alleviating or aggravating factors.  She also notes that she has been having intermittent palpitations for the last 4 months and has upcoming cardiology appointment with Dr. Casas next week possibly for a Holter monitor.  She states she checked her blood pressure at home and it was high and she has a pressure-like headache that resembles prior headaches a little bit more severe in nature.  Denies fevers, chills, shortness of breath, nausea, vomiting, abdominal pain, weakness, numbness. Well appearing on exam. In no acute distress. She also has upcoming GI appointment later this month. 58-year-old female, past medical history of hypertension, diabetes, gastric ulcer, s/p gastric bypass surgery, presenting with chest burning that has been ongoing intermittently for 4 days, no alleviating or aggravating factors.  She also notes that she has been having intermittent palpitations for the last 4 months and has upcoming cardiology appointment with Dr. Casas next week possibly for a Holter monitor.  Her last stress test was 2/2024 and everything was okay at that time as per patient.  She states she checked her blood pressure at home and it was high and she has a pressure-like headache that resembles prior headaches a little bit more severe in nature.  Denies fevers, chills, shortness of breath, nausea, vomiting, abdominal pain, weakness, numbness. Well appearing on exam. In no acute distress. She also has upcoming GI appointment later this month. Labs and EKG were ordered and reviewed.  Imaging was ordered and reviewed by me.  Appropriate medications for patient's presenting complaints were ordered and effects were reassessed.  Patient states she feels a little better.  Patient sleeping comfortably.  Escalation to admission/observation was considered.  Started on Plavix as she states she is allergic to NSAIDs. Patient requires inpatient hospitalization.

## 2024-06-07 NOTE — H&P ADULT - HISTORY OF PRESENT ILLNESS
Case of 58-year-old female with PMHx of HTN, DM, gastric ulcer, presenting with burning sensation of chest for 4 days.    Such sensation has been ongoing intermittently for 4 days, no alleviating or aggravating factors.  She also notes that she has been having intermittent palpitations for the last 4 months.  Today she also mentions pressure like headache that is bilateral.    She has an upcoming cardiology appointment with Dr. Casas next week possibly for a Holter monitor.    Of note, she mentioned that she checked her blood pressure at home and it was elevated.  Denies fevers, chills, shortness of breath, nausea, vomiting, abdominal pain, weakness, numbness.    In the ED, vitals were   · /101 mm Hg  · Heart Rate 101 /min  · Respiration Rate (breaths/min) 18 /min  · Temp (F) 98.2 Degrees F  · SpO2 (%) 98 %    Labs were remarkable for WBC count of 15k, Trop 32 =>54, AG 18  EKG    CTH No evidence of acute intracranial pathology.  CXR: Low lung volume. Stable opacity adjacent to the aorta. Refer to the report of the CT scan of the chest dated July 19, 2023.    In the ED, patient was given Plavix 300mg and Famotidine.   Case of 58-year-old female with PMHx of HTN, DM, gastric ulcer?, presenting with burning sensation of chest for 4 days.    Such sensation has been ongoing intermittently for 4 days, no alleviating or aggravating factors.  She also notes that she has been having intermittent palpitations for the last 4 months.  Today she also mentions pressure like headache that is bilateral.    She has been following with Dr Casas for the palpitations, patient mentioned doing a calcium score test? recently and it was reportedly within normal limits.    Of note, she has cervical disc disease and she got an injection in her neck with pain management yesterday.    Denies fevers, chills, shortness of breath, nausea, vomiting, abdominal pain, weakness.    In the ED, vitals were   · /101 mm Hg  · Heart Rate 101 /min  · Respiration Rate (breaths/min) 18 /min  · Temp (F) 98.2 Degrees F  · SpO2 (%) 98 %    Labs were remarkable for WBC count of 15k, Trop 32 =>54, AG 18  EKG showing sinus tachy    CTH No evidence of acute intracranial pathology.  CXR: Low lung volume. Stable opacity adjacent to the aorta. Refer to the report of the CT scan of the chest dated July 19, 2023.    In the ED, patient was given Plavix 300mg and Famotidine.

## 2024-06-07 NOTE — CONSULT NOTE ADULT - SUBJECTIVE AND OBJECTIVE BOX
Date of Admission:    CHIEF COMPLAINT: Chest pain    HISTORY OF PRESENT ILLNESS: 58yFemale with PMH below presented to the hospital for chest pain    PAST MEDICAL & SURGICAL HISTORY:  Uterine cancer  1999      DM (diabetes mellitus)      HTN (hypertension)      Obese      Pneumonia  2012 post gastric sleeve      H/O pneumothorax  2012 complications post  gastric sleeve      Ureteral stone      Hydronephrosis, left      S/P hysterectomy  1999, total      History of cholecystectomy      S/P gastric surgery  SLEEVE 2012 -post op complications. ICU X MANY WKS PER PT      S/P gastric surgery  2000 BAND PROCEDURE & REMOVED      H/O hernia repair        HEALTH ISSUES - PROBLEM Dx:        FAMILY HISTORY:  FH: CAD (coronary artery disease)  father    Family history of diabetes mellitus (DM)  father      Allergies    Benadryl (Hives; Urticaria)  IV Contrast (Anaphylaxis)  latex (Anaphylaxis; Rash; Hives)  nonsteroidal anti-inflammatory agents (Blisters (Mild to Mod))  shellfish (Anaphylaxis)  iodine (Rash; Urticaria; Hives)  penicillins (Anaphylaxis)  sulfa drugs (Anaphylaxis)    Intolerances    	  Home Medications:  Bystolic 5 mg oral tablet: 1 tab(s) orally once a day (07 Jun 2024 10:36)  Levemir FlexPen 100 units/mL subcutaneous solution: 50 unit(s) subcutaneous once a day (at bedtime) (07 Jun 2024 10:35)  losartan 50 mg oral tablet: 1 tab(s) orally 2 times a day (07 Jun 2024 10:35)  metFORMIN 1000 mg oral tablet: 1 tab(s) orally 2 times a day (07 Jun 2024 10:36)  NovoLOG FlexPen 100 units/mL injectable solution: 22 unit(s) injectable 3 times a day (before meals) (07 Jun 2024 10:35)  omeprazole 40 mg oral delayed release capsule: 1 cap(s) orally once a day (07 Jun 2024 10:35)    MEDICATIONS  (STANDING):  dextrose 50% Injectable 12.5 Gram(s) IV Push once  dextrose 50% Injectable 25 Gram(s) IV Push once  enoxaparin Injectable 40 milliGRAM(s) SubCutaneous every 24 hours  insulin glargine Injectable (LANTUS) 50 Unit(s) SubCutaneous at bedtime  insulin lispro (ADMELOG) corrective regimen sliding scale   SubCutaneous three times a day before meals  insulin lispro Injectable (ADMELOG) 22 Unit(s) SubCutaneous three times a day before meals  losartan 50 milliGRAM(s) Oral every 12 hours  metoprolol tartrate 50 milliGRAM(s) Oral every 12 hours  pantoprazole    Tablet 40 milliGRAM(s) Oral every 12 hours  regadenoson Injectable 0.4 milliGRAM(s) IV Push once    MEDICATIONS  (PRN):  dextrose Oral Gel 15 Gram(s) Oral once PRN Blood Glucose LESS THAN 70 milliGRAM(s)/deciliter              SOCIAL HISTORY:    [ x] Non-smoker  [ ] Smoker  [ ] Alcohol      REVIEW OF SYSTEMS:  CONSTITUTIONAL: No fever, weight loss, or fatigue  CARDIOLOGY: PAtient denies chest pain, shortness of breath or syncopal episodes.   RESPIRATORY: denies shortness of breath, wheezeing.   NEUROLOGICAL: NO weakness, no focal deficits to report.  ENDOCRINOLOGICAL: no recent change in diabetic medications.   GI: no BRBPR, no N,V,diarrhea.    PSYCHIATRY: normal mood and affect  HEENT: no nasal discharge, no ecchymosis  SKIN: no ecchymosis, no breakdown  MUSCULOSKELETAL: Full range of motion x4.      PHYSICAL EXAM:  T(C): 36.6 (06-07-24 @ 14:17), Max: 36.8 (06-07-24 @ 01:13)  HR: 69 (06-07-24 @ 14:17) (69 - 101)  BP: 145/82 (06-07-24 @ 14:17) (141/73 - 188/101)  RR: 16 (06-07-24 @ 14:17) (16 - 18)  SpO2: 98% (06-07-24 @ 14:17) (96% - 98%)  Wt(kg): --  I&O's Summary    07 Jun 2024 07:01  -  07 Jun 2024 19:14  --------------------------------------------------------  IN: 240 mL / OUT: 600 mL / NET: -360 mL      Daily Height in cm: 162.56 (07 Jun 2024 08:13)    Daily     General Appearance: Normal	  Cardiovascular: Normal S1 S2, No JVD, No murmurs, No edema  Respiratory: Lungs clear to auscultation	  Psychiatry: A & O x 3, Mood & affect appropriate  Gastrointestinal:  Soft, Non-tender  Skin: No rashes, No ecchymoses, No cyanosis	  Neurologic: Non-focal  Extremities: Normal range of motion, No clubbing, cyanosis or edema  Vascular: Peripheral pulses palpable 2+ bilaterally        LABS:	 	                          14.2   15.62 )-----------( 274      ( 07 Jun 2024 03:10 )             41.7     06-07    135  |  99  |  23<H>  ----------------------------<  281<H>  5.0   |  18  |  1.0    Ca    10.0      07 Jun 2024 03:10    TPro  7.2  /  Alb  4.5  /  TBili  0.4  /  DBili  x   /  AST  32  /  ALT  36  /  AlkPhos  85  06-07            proBNP:   Lipid Profile:   HgA1c:   TSH:       CARDIAC MARKERS:            TELEMETRY EVENTS: 	    ECG:  	  RADIOLOGY:  OTHER: 	    PREVIOUS DIAGNOSTIC TESTING:    [ ] Echocardiogram:  [ ]  Catheterization:  [ ] Stress Test:  	  	  ASSESSMENT/PLAN:

## 2024-06-07 NOTE — ED PROVIDER NOTE - CARE PROVIDER_API CALL
Chico Casas  Interventional Cardiology  [address] [place] [id number]  Phone: [phone]  Fax: [phone]  Follow Up Time: 1-3 Days

## 2024-06-07 NOTE — PATIENT PROFILE ADULT - FALL HARM RISK - FACTORS NURSING JUDGEMENT
[facility] gynecology consult note    Dae Castañeda MRN# [id number]   Age: 43 year old YOB: 1978     Date of Admission:  8/23/2021    Primary care provider: Brigitte Bryan           Chief Complaint:   Dae Castañeda is a 43 year old female who is underwent appendectomy on /23/21 for abdominal pain, peritoneal signs and CT scan that suggested appendicitis. There was a CT finding that suggested pelvic congestion syndrome so there was a consult regarding this as well. Dae is a patient of our clinic at [facility]. She has been under the care of Dr. Garrison and has mentioned to her about her pain. This has been worked up in the clinic but we do not have the details of those visits. She was placed on birth control pills for management of possible gyn related pain. She is in quite a bit of pain but is POD #1 from her appendectomy last night. She has undergone a colonoscopy and upper endoscopy with a duodenal mass that might be worked up while she is here in the hospital.          Pregnancy history:     OBSTETRIC HISTORY:  No obstetric history on file.  OB History   No obstetric history on file.         Prenatal Labs:   Lab Results   Component Value Date    ABO O 01/09/2011    RH  Neg 01/09/2011    AS Neg 01/09/2011    HGB 13.1 08/24/2021    HIV Negative 02/11/2013         Active Problem List  Patient Active Problem List   Diagnosis     Chromosome abnormality     Myalgia and myositis     Female infertility associated with anovulation     CARDIOVASCULAR SCREENING; LDL GOAL LESS THAN 160     Mild Depression  [296.21]       Serotonin syndrome     Myoclonic jerking     Fibromyalgia     H/O electroencephalogram     Abnormal involuntary movement     H/O magnetic resonance imaging of brain and brain stem     Knee pain     Abdominal pain, generalized     Mass of duodenum     Elevated lipase     Acute appendicitis with generalized peritonitis, without gangrene or abscess, unspecified  whether perforation present       Medication Prior to Admission  Medications Prior to Admission   Medication Sig Dispense Refill Last Dose     buPROPion (WELLBUTRIN) 75 MG tablet TAKE 1 TABLET (75 MG) BY MOUTH 2 TIMES DAILY (Patient taking differently: Take 75 mg by mouth daily ) 60 tablet 0 8/23/2021 at AM     drospirenone-ethinyl estradiol (ELIS) 3-0.02 MG tablet Take 1 tablet by mouth daily   8/23/2021 at Unknown time   .        Maternal Past Medical History:     Past Medical History:   Diagnosis Date     Abnormal involuntary movement 1/31/2017    Author: Shaheed Freeman MD Service: Hospitalist Author Type: Physician    Filed: 5/14/2015  8:10 PM Note Time: 5/14/2015 10:35 AM Status: Signed   : Shaheed Freeman MD (Physician)     Expand All Collapse All    DATE OF ADMISSION:  05/12/2015      DATE OF DISCHARGE:  05/14/2015      PRIMARY CARE PHYSICIAN:  Nancy Brady MD      CHIEF COMPLAINT:  Involuntary jerky movements.         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 Orders    EEG        Expand All Collapse All    ONE-HOUR VIDEO ELECTROENCEPHALOGRAM       ORDERING PHYSICIAN:  Lito Jasmine MD       EEG #:        This EEG was done on [facility] with video monitoring recording of the spell.  EEG demonstrated some sharp wave activity bilaterally that is not rhythmic.  The patient otherwise had a spell that started with tingling and d     H/O magnetic resonance imaging of brain and brain stem 3/6/2017    2/13/2017 normal brain mri     Major depressive disorder, single episode, mild (H) 1995    chronic     Myalgia and myositis, unspecified 1995    episodic low back and hip pain     Ruptured ovarian cyst 1/2011    right, requiring laparoscopy for drainage intra-abdominal hematoma      Serotonin syndrome     SSRI related (paroxetine and venlaxine), also triggered by anesthetic agents in 2011     Supervision of other normal pregnancy      - vaginal, pergonal for infertility                       Family History:   This patient has no significant family history            Social History:     Social History     Tobacco Use     Smoking status: Never Smoker     Smokeless tobacco: Never Used   Substance Use Topics     Alcohol use: Yes     Comment: 1 per month            Review of Systems:   C: NEGATIVE for fever, chills, change in weight  E/M: NEGATIVE for ear, mouth and throat problems  R: NEGATIVE for significant cough or SOB  CV: NEGATIVE for chest pain, palpitations or peripheral edema          Physical Exam:   Vitals were reviewed    Constitutional: Awake, alert, cooperative, no apparent distress, and appears stated age.  Eyes: Lids and lashes normal, pupils equal, round and reactive to light, extra ocular muscles intact, sclera clear, conjunctiva normal.  ENT: Normocephalic, without obvious abnormality, atramatic, sinuses nontender on palpation, external ears without lesions, oral pharynx with moist mucus membranes, tonsils without erythema or exudates, gums normal and good dentition.  Neck: Supple, symmetrical, trachea midline, no adenopathy, thyroid symmetric, not enlarged and no tenderness, skin normal.  Hematologic / Lymphatic: No cervical lymphadenopathy and no supraclavicular lymphadenopathy.  Back: Symmetric, no curvature, spinous processes are non-tender on palpation, paraspinous muscles are non-tender on palpation, no costal vertebral tenderness.  Lungs: No increased work of breathing, good air exchange  Abdomen: diffusely tender with dressings in place due to recent surgery  Musculoskeletal: No redness, warmth, or swelling of the joints.  Full range of motion noted.  Neuropsychiatric: Normal affect, mood, orientation, memory and insight.  Skin: No rashes, erythema, pallor, petechia  or purpura.                        Assessment:   Dae Castañeda is POD#1 from an appendectomy based on CT findings with another CT finding suggesting pelvic congestion syndrome.          Plan:   Pelvic congestion syndrome is an entity that is suspect if it exists and how to diagnose it. It is felt to be a source of chronic pelvic pain of usually at least 3-6 months duration. It is often managed medically with hormonal manipulation such as birth control pills. Hysterectomy can be an option but this would require all other organ systems to be ruled out. She might have pelvic congestion syndrome but I dont think this is pertinent to her current hospitalization. Usually pelvic congestion syndrome does not present with acute pain to the ER like this patient did. I think she needs to continue with recovery from her current hospitalization and her GI workup that sounds like she might undergo during this hospitalization. Once she is through with this recovery if she has not improved then I told her to call the office and make an apt to discuss options such as hysterectomy but this would only be if she has ruled out all other causes and right now she is actively recovering from her current surgery. I see minimal need to follow along with this patient during this hospitalization but would be happy to give suggestions or opinions if desires. [phone]. Thank you  Shine Collazo MD   Yes

## 2024-06-07 NOTE — H&P ADULT - ASSESSMENT
58-year-old female with PMHx of HTN, DM, gastric ulcer, presenting with burning sensation of chest for 4 days.    #Chest burning  - Trop 32 => 54  - EKG showing  - Patient currently feeling  - Sp Plavix 300mg in ED  Plan  - Trend Trop  - Cardio eval Dr Casas      #HTN urgency  - Symptoms could be related to uncontrolled BP at home  - BP trending down with no intervention  Plan  - c/w     #Leukocytosis with neutrophilic shift  - No fever, no symptoms to suggest an active infection  Plan  - Monitor off Abx for now  - Repeat CBC with differential     #Elevated AG gap  - No MERRITT or evidence to suggest DKA  - Could be elevated lactic acid from metformin use   Plan  - Monitor for now    #DM on insulin complicated by diabetic neuropathy  - Last a1c un chart was 90% in 2020  - Glucose elevated on CMP  Plan  - c/w home insulin basal-bolus and adjust as needed  - Hold metformin  - a1c    #Hx of gastric ulcer?  - Last EGD in 2022 showing: Grade A esophagitis in the gastroesophageal junction compatible withnonspecific erosive esophagitis. (Biopsy). Erythema and congestion in the antrum and stomach body compatible with non-erosive gastritis. (Biopsy). Hiatal Hernia.Normal mucosa in the anterior bulb, distal bulb, first part of the duodenumand second part of the duodenum. (Biopsy).Surgical anatomy of the stomach noted.  Plan  - c/w PPI?  - Avoid NSAIDs    #DVT prophylaxis  #GI prophylaxis  #Activity  #Diet 58-year-old female with PMHx of HTN, DM, gastric ulcer, presenting with burning sensation of chest for 4 days.    #Chest burning  #Palpitations  - Trop 32 => 54  - EKG showing  - Patient currently feeling  - Sp Plavix 300mg in ED  Plan  - Monitor on tele for any arrhythmias  - Serial EKGs in case of chest pain  - Trend Trop  - Cardio eval Dr Casas      #HTN urgency  - Symptoms could be related to uncontrolled BP at home  - BP trending down with no intervention  Plan  - c/w     #Leukocytosis with neutrophilic shift  - No fever, no symptoms to suggest an active infection  Plan  - Monitor off Abx for now  - Repeat CBC with differential     #Elevated AG gap  - No MERRITT or evidence to suggest DKA  - Could be elevated lactic acid from metformin use   Plan  - Monitor for now    #DM on insulin complicated by diabetic neuropathy  - Last a1c un chart was 90% in 2020  - Glucose elevated on CMP  Plan  - c/w home insulin basal-bolus and adjust as needed  - Hold metformin  - a1c    #Hx of gastric sleeve  #Hx of gastric ulcer?  - Last EGD in 2022 showing: Grade A esophagitis in the gastroesophageal junction compatible withnonspecific erosive esophagitis. (Biopsy). Erythema and congestion in the antrum and stomach body compatible with non-erosive gastritis. (Biopsy). Hiatal Hernia.Normal mucosa in the anterior bulb, distal bulb, first part of the duodenumand second part of the duodenum. (Biopsy).Surgical anatomy of the stomach noted.  Plan  - c/w PPI?  - Avoid NSAIDs    #DVT prophylaxis  #GI prophylaxis  #Activity  #Diet                         58-year-old female with PMHx of HTN, DM, gastric ulcer?, presenting with burning sensation of chest for 4 days.    #Chest burning  #Palpitations  - Trop 32 => 54  - EKG showing sinus tachycardia, no acute ischemic changes  - Patient currently does not have any symptoms  - Sp Plavix 300mg in ED  - Recent calcium score test normal? as per patient  Plan  - Monitor on tele for any arrhythmias  - Serial EKGs in case of chest pain  - Trend Trop  - Resume BB (switch bystolic to lopressor)  - Cardio eval Dr Casas  - Get records of recent cardiac tests     #Hx of gastric sleeve  #Hx of gastric ulcer  - Last EGD in chart in 2022 showing: Grade A esophagitis in the gastroesophageal junction compatible with nonspecific erosive esophagitis. (Biopsy). Erythema and congestion in the antrum and stomach body compatible with non-erosive gastritis. (Biopsy). Hiatal Hernia. Normal mucosa in the anterior bulb, distal bulb, first part of the duodenum and second part of the duodenum. (Biopsy).Surgical anatomy of the stomach noted.  - Patient did another upper endoscopy in November 2023 at [facility] showing gastric ulcer?  - Burning chest pain could related to esophagitis/gastritis?  Plan  - c/w home PPI and increase to BID for now  - Consider GI eval if pain does not improve  - Avoid NSAIDs    #HTN urgency  - Symptoms could be related to uncontrolled BP at home?  - BP trending down with no intervention  Plan  - c/w losartan 50mg BID    #Headache- resolved  #Cervical stenosis? sp injection one day ago  - CTH negative   - Headache could be related to elevated BP vs cervicalgia   Plan  - Tylenol PRN for now    #Leukocytosis with neutrophilic shift  - No fever, no symptoms to suggest an active infection  - Could be reactive to injection she had yesterday?  Plan  - Monitor off Abx for now  - Repeat CBC with differential in AM    #Elevated AG gap  - No MERRITT or evidence to suggest DKA  - Could be elevated lactic acid from metformin use   Plan  - Monitor for now    #DM on insulin complicated by diabetic neuropathy  - Last a1c un chart was 90% in 2020  - Glucose elevated on CMP  - Unclear why patient is not on statin  Plan  - c/w home insulin basal-bolus and adjust as needed  - Hold metformin  - a1c  - Lipid profile    #DVT prophylaxis: Lovenox 40  #GI prophylaxis: home PPI  #Activity: IAT  #Diet: DASH, diabetic                         58-year-old female with PMHx of HTN, DM, gastric ulcer?, presenting with burning sensation of chest for 4 days.    #Chest burning  #Palpitations  - Trop 32 => 54  - EKG showing sinus tachycardia, no acute ischemic changes  - Patient currently does not have any symptoms  - Sp Plavix 300mg in ED  - Recent calcium score test normal? as per patient  Plan  - Monitor on tele for any arrhythmias  - Serial EKGs in case of chest pain  - Trend Trop  - TTE  - Resume BB (switch bystolic to lopressor)  - Cardio eval Dr Casas  - Get records of recent cardiac tests     #Hx of gastric sleeve  #Hx of gastric ulcer  - Last EGD in chart in 2022 showing: Grade A esophagitis in the gastroesophageal junction compatible with nonspecific erosive esophagitis. (Biopsy). Erythema and congestion in the antrum and stomach body compatible with non-erosive gastritis. (Biopsy). Hiatal Hernia. Normal mucosa in the anterior bulb, distal bulb, first part of the duodenum and second part of the duodenum. (Biopsy).Surgical anatomy of the stomach noted.  - Patient did another upper endoscopy in November 2023 at [facility] showing gastric ulcer?  - Burning chest pain could related to esophagitis/gastritis?  Plan  - c/w home PPI and increase to BID for now  - Consider GI eval if pain does not improve  - Avoid NSAIDs    #HTN urgency  - Symptoms could be related to uncontrolled BP at home?  - BP trending down with no intervention  Plan  - c/w losartan 50mg BID    #Headache- resolved  #Cervical stenosis? sp injection one day ago  - CTH negative   - Headache could be related to elevated BP vs cervicalgia   Plan  - Tylenol PRN for now    #Leukocytosis with neutrophilic shift  - No fever, no symptoms to suggest an active infection  - Could be reactive to injection she had yesterday?  Plan  - Monitor off Abx for now  - Repeat CBC with differential in AM    #Elevated AG gap  - No MERRITT or evidence to suggest DKA  - Could be elevated lactic acid from metformin use   Plan  - Monitor for now    #DM on insulin complicated by diabetic neuropathy  - Last a1c un chart was 90% in 2020  - Glucose elevated on CMP  - Unclear why patient is not on statin  Plan  - c/w home insulin basal-bolus and adjust as needed  - Hold metformin  - a1c  - Lipid profile    #DVT prophylaxis: Lovenox 40  #GI prophylaxis: home PPI  #Activity: IAT  #Diet: DASH, diabetic                         58-year-old female with PMHx of HTN, DM, gastric ulcer?, presenting with burning sensation of chest for 4 days.    #Chest burning  #Possibly cardiac chest pain  #Palpitations  - Pain reproducible on deep epigastric palpation  - Trop 32 => 54  - EKG showing sinus tachycardia, no acute ischemic changes  - Patient currently asymptomatic  - Sp Plavix 300mg in ED  - Recent calcium score test normal? as per patient  Plan  - Monitor on tele for any arrhythmias  - Serial EKGs in case of chest pain  - Trend Trop  - TTE  - Resume BB (switch bystolic to lopressor)  - Cardio eval Dr Casas  - Get records of recent cardiac tests     #Hx of gastric sleeve  #Hx of gastric ulcer  - Last EGD in chart in 2022 showing: Grade A esophagitis in the gastroesophageal junction compatible with nonspecific erosive esophagitis. (Biopsy). Erythema and congestion in the antrum and stomach body compatible with non-erosive gastritis. (Biopsy). Hiatal Hernia. Normal mucosa in the anterior bulb, distal bulb, first part of the duodenum and second part of the duodenum. (Biopsy).Surgical anatomy of the stomach noted.  - Patient did another upper endoscopy in November 2023 at [facility] showing gastric ulcer?  - Burning chest pain could related to esophagitis/gastritis?  Plan  - c/w home PPI and increase to BID for now  - Consider GI eval if pain does not improve  - Avoid NSAIDs    #HTN urgency  - Symptoms could be related to uncontrolled BP at home?  - BP trending down with no intervention  Plan  - c/w losartan 50mg BID    #Headache- resolved  #Cervical stenosis? sp injection one day ago  - CTH negative   - Headache could be related to elevated BP vs cervicalgia   Plan  - Tylenol PRN for now    #Leukocytosis with neutrophilic shift  - No fever, no symptoms to suggest an active infection  - Could be reactive to injection she had yesterday?  Plan  - Monitor off Abx for now  - Repeat CBC with differential in AM    #Elevated AG gap  - No MERRITT or evidence to suggest DKA  - Could be elevated lactic acid from metformin use   Plan  - Monitor for now    #DM on insulin complicated by diabetic neuropathy  - Last a1c un chart was 90% in 2020  - Glucose elevated on CMP  - Unclear why patient is not on statin  Plan  - c/w home insulin basal-bolus and adjust as needed  - Hold metformin  - a1c  - Lipid profile    #DVT prophylaxis: Lovenox 40  #GI prophylaxis: home PPI  #Activity: IAT  #Diet: DASH, diabetic

## 2024-06-07 NOTE — ED PROVIDER NOTE - CARE PLAN
1 Principal Discharge DX:	Burning chest pain   Principal Discharge DX:	NSTEMI (non-ST elevation myocardial infarction)

## 2024-06-07 NOTE — DISCHARGE NOTE NURSING/CASE MANAGEMENT/SOCIAL WORK - NSDCPEPTCAREGIVEDUMATLIST _GEN_ALL_CORE
Post-Care Instructions: I reviewed with the patient in detail post-care instructions. Patient is to wear sunprotection, and avoid picking at any of the treated lesions. Pt may apply Vaseline to crusted or scabbing areas.
Medical Necessity Information: It is in your best interest to select a reason for this procedure from the list below. All of these items fulfill various CMS LCD requirements except the new and changing color options.
Render Post-Care Instructions In Note?: no
Medical Necessity Clause: This procedure was medically necessary because the lesions that were treated were:
Consent: The patient's consent was obtained including but not limited to risks of crusting, scabbing, blistering, scarring, darker or lighter pigmentary change, recurrence, incomplete removal and infection.
Detail Level: Detailed
MI

## 2024-06-07 NOTE — H&P ADULT - NSHPPOAPULMEMBOLUS_GEN_A_CORE
[facility] INPATIENT ENCOUNTER  PHYSICAL MEDICINE AND REHABILITATION  DAILY PROGRESS NOTE    ADMISSION DATE:  11/26/2023  DATE:  12/11/2023  CURRENT HOSPITAL DAY:  Hospital Day: 16  ATTENDING PHYSICIAN:  Chari Camilo MD  CODE STATUS:  Full Resuscitation        HISTORY:    Daryl Sal is a 43 year old female patient admitted with Respiratory failure (CMD) [J96.90]with morbid obesity (BMI of 40), DM2 with diabetic PN, calciphylaxis with severe pain in low back and feet, severe PAD s/p bilateral TMA, ESRD on HD MWF via left AVF, CAD s/p CABG in 2022 with severe ischemic CM (EF of 27%), who is admitted to [facility] on 11/26/23 with shortness of breath, cough, and chest tightness.  She was ruled out for PT and COVID. She was found to have R>L pleural effusions felt due to acute on chronic combined heart failure. She required BIPAP and then O2NC. TTE showed EF of 18%. She has been seen by EPS for SVT/multifocal atrial tachycardia and treated with amiodarone. She was seen by pain service and started on gabapentin and PRN PO dilaudid. She is also on scheduled IV benadryl.  She underwent right and left heart catheterizations which showed severe multivessel CAD. Current plan appears to be for medical management.  The patient was treated with increased diuresis via HD.   She is no longer requiring O2 NC.  11/29 CXR showed only small bilateral pleural effusions. The patient had been kept NWB BLE for wound healing. Podiatry was requested for consultation and recommended WBAT BLE.   12/11 HD, working on [facility] transportation to and from HD , has not been fitted for custom shoes per pt - RN will call  Orthotics      MEDICATIONS:    The medication list was reviewed today.       OBJECTIVE:    VITAL SIGNS:     Vital Last Value 24 Hour Range   Temperature 98.1 °F (36.7 °C) (12/11/23 1130) Temp  Min: 97.5 °F (36.4 °C)  Max: 98.6 °F (37 °C)   Pulse 74 (12/11/23 1400) Pulse  Min: 71  Max: 77   Respiratory 18 (12/11/23  1130) Resp  Min: 15  Max: 18   Non-Invasive  Blood Pressure 105/67 (12/11/23 1400) BP  Min: 91/58  Max: 108/64   Pulse Oximetry 100 % (12/11/23 0800) SpO2  Min: 97 %  Max: 100 %     Vital Today Admitted   Weight  (UTD, pt. on cart) (12/11/23 1130) Weight: 106.6 kg (235 lb) (11/26/23 2342)       INTAKE/OUTPUT:      Intake/Output Summary (Last 24 hours) at 12/11/2023 1423  Last data filed at 12/11/2023 0600  Gross per 24 hour   Intake 790 ml   Output 0 ml   Net 790 ml       Bowel: Stool Amount: Medium (11/29/23 0000)  Stool Occurrence: 1 (12/08/23 1600)     Bladder PVR:        PHYSICAL EXAMINATION:  Physical Exam- alert, see in HD, able to lift LE off HD cart ,orientated  Discussed dc plan in detail with pt     Current Functional Status:     Mobility (since 12/9/2023)       reposition in bed  modified independent    supine to sit  modified independent    sit to supine  modified independent    sit to stand  contact guard/touching/steadying assist    stand to sit  contact guard/touching/steadying assist    toilet  contact guard/touching/steadying assist    transfers assistive devices  2-wheeled walker    gait assistance  contact guard/touching/steadying assist    1st trial  20    gait surface  even            Self Care/ADL (since 12/9/2023)       None            Communication/Cognition (since 12/9/2023)       None            LABORATORY DATA:    Recent Labs   Lab 12/11/23  0546 12/10/23  0544 12/09/23  0540   WBC 5.7 5.7 5.8   RBC 3.05* 3.06* 3.04*   HGB 8.2* 8.3* 8.2*   HCT 27.4* 27.5* 26.9*   MCV 89.8 89.9 88.5   MCH 26.9 27.1 27.0   MCHC 29.9* 30.2* 30.5*   RDWCV 19.6* 19.7* 19.8*    151 145   TLYMPH 43 42 33       Recent Labs   Lab 12/11/23  0546 12/10/23  0544 12/09/23  0540   SODIUM 137 138 139   CHLORIDE 101 100 101   BUN 30* 20 13   POTASSIUM 4.3 4.0 3.8   GLUCOSE 70 82 88   CREATININE 7.76* 6.52* 4.83*   CALCIUM 8.4 8.4 7.9*       Recent Labs   Lab 12/05/23  0554   INR 1.2       IMAGING STUDIES:      Cath/PV Case  Addendum:   Ost LAD to Prox LAD lesion with 100% stenosis.     Prox RCA lesion with 100% stenosis.     Mid Cx lesion with 100% stenosis.     Ramus lesion with 50% stenosis.     Ost Cx to Prox Cx lesion with 99% stenosis.     Origin to Prox Graft lesion with 100% stenosis.     Origin to Prox Graft lesion with 99% in-stent restenosis s/p IVUS  guided PCI using NI x 1     CARDIAC CATHETERIZATION REPORT     CARDIAC CATH INDICATION: NSTEMI and new onset cardiomyopathy       PROCEDURES PERFORMED:     Access: Ultrasound-guided right brachial vein access, ultrasound-guided  right common femoral   2.  Coronary angiography   3.  Left heart cath   4.  Ascending aortogram   5.  Right heart catheterization   6.  PCI to the SVG to PDA using NI x 1   7.  IVUS   8.  Perclose closure device to the right common femoral artery   9.  Bypass graft angiogram     PROCEDURE IN DETAILS:       The risks and alternatives of the procedures and conscious sedation were  explained to the patient and informed consent was obtained.The patient  brought to catheterization lab in stable condition.  The right groin was  prepped and draped in the usual sterile fashion. Time out performed.  Versed and Fentanyl given by nursing for moderate sedation, I was present  throughout the procedure and sedation time. A dedicated staff member was  charged with responsibility on the patient's level of consciousness, O2  saturation, end-tidal CO2, blood pressure, and heart rate. 2% lidocaine 10  mL was utilized infiltrate the in Right groin, Under US guidance (image  saved) because of possible Calcium in the artery, the vessel was accessed  using the modified Seldinger technique, using a micropuncture needle,  access was obtained in the right common femoral artery and a  micro sheath  was inserted without complications.  Angiogram was performed in the ROSEN  projection, which demonstrated an adequate access.  This up sized to a  standard 6-Polish  sheath.     Under ultrasound guidance micropuncture I accessed the right basilic vein  using modified Seldinger technique via singlewall cannulation advanced  then 5 Qatari sheath into the right brachial vein flushed and then  advanced PWP 5 Qatari catheter advanced the catheter into the chest and  then balloon was inflated then we advanced the catheter into the pulmonary  artery to which position oxygen sample sent for Abdulaziz cardiac output and  cardiac index finally balloon was deflated pulled back the catheter into  the RV followed by the PA followed by the RA finally catheter was removed  then we turned our attention to the left heart catheterization.     Over a wire,  JL4  diagnostic 6 Qatari catheter used to engage left  coronary artery and imaged. Then exchanged over a wire with JR4 diagnostic  6 Qatari catheter was used to cross the aortic valve and measure LVEDP,  pulled back to detect any aortic valve gradient. Right coronary artery was  engaged and imaged.  Then we attempted to engage the graft with the JR  which was unsuccessful I used initially 3 DRC as well which was  unsuccessful finally AL 0.75 guide was successful to engage the SVG to the  PDA, we used also AL-1 which was a failure and unsuccessful to engage the  graft we also performed ascending aortogram using pigtail catheter and  power injector device in the Tongan projection which shows a patent graft to  the severely stenotic lesion in the in-stent restenosis.  Finally catheter  was removed and decision was made to proceed with intervention to the  in-stent restenosis of the SVG to the PDA.       INTERVENTION INDICATION: Cardiomyopathy EF 20%, NSTEMI, severe ISR 99%  stenosis   INTERVENTIONAL TECHNIQUE:     -The culprit vessel: SVG to the PDA   -DELON flow before: 3   -DELON flow after: 3   -Residual stenosis: 0%     A weight-based bolus dose of IV unfractionated heparin was given to  achieve and maintain an ACT greater than 250 seconds for the  duration of  the procedure. ACT was checked to make sure therapeutic level before PCI  started.     AL 0.75 guide was advanced over wire and used to engage the SVG to PDA   Run-through wire was advanced into the distal PDA     Predilation was performed using 2.5 mm NC balloon then we advanced IVUS to  assess the etiology of the stent failure then we performed scoring NC  high-pressure balloon 3 mm x 10-minute balloon, then we deployed  drug-eluting stents [employer] 3 x 20 mm deployed at nominal  pressure and then postdilated with NC 3 mm based on IVUS measurements to  high-pressure up to 20 albina.  Then we performed IVUS to assess distal edge  of the stent and the stent full expansion finally IVUS was removed the  wire was removed     Final angiography confirmed DELON-3 flow without dissection, distal  embolization or perforation. The patient received aspirin 325 mg and  Plavix 600 mg PO in the cath lab.      Perclose closure device was used with excellent Hemostasis of the right  common femoral artery. Patient tolerated the procedure well and without  complications.He left without any precordial pain. He had 2+ right  dorsalis pedis pulse.  He was transferred to the recovery area in a stable  condition to be monitored. Discussed the findings with the patient and his  family.      -------------------------------------     CORONARY ANATOMY/FINDINGS:     1. Left main artery: Mild disease   2. Left anterior descending artery chronic total occluded proximally   3. Left circumflex chronic total occluded mid with 99% proximally   4.  Ramus intermedius diffuse moderate disease with   5. Right coronary artery: Chronic total occluded proximally       BRIEF HEMODYNAMICS:       Left Heart Cath Pressures:   LV Systolic: 106   LV Begin Diastolic: 7   LV End Diastolic: 30   LV1v PeakdPdt: 625   Ao Systolic: 108   Ao Diastolic: 59   Ao Mean: 80     Right Heart Cath Pressures:   Findings:   Hemodynamics:   1. Systemic blood  pressure and heart rate: 202/74 mmHg, 93 bpm   2. Right atrial pressures:     15/15/13 mmHg   3. Right ventricular pressures:    59/11/17 mmHg   4. Pulmonary artery pressures:   65/29/45 mmHg   5. Pulmonary capillary wedge pressures: 32/46/30 mmHg   6. Transpulmonary Gradient:    15   8. Pulmonary Artery Pulsatility Index (CAROLE): 2.8     Saturations Study:   1. Aortic saturations:     91%   3. Pulmonary artery saturations:   58%     Cardiac Outputs calculated by Abdulaziz method:   1. Cardiac output and index:    7.7 L/min, 3.56 L/min/m2   2. Pulmonary vascular resistance:   2.0 Woods Units, 156.9 (dynes x  sec)/cm5   3. Systemic vascular resistance:   8.8 Woods Units, 700.7 (dynes x  sec)/cm5   4. Right ventricular stroke work index:  1,225 (mmHg x ml)/m2   5. Cardiac power output:     2 W     CONCLUSION:    Severe multivessel CAD: LAD, LCx and RCA are chronic total occluded ()   Bypass graft angiogram: LIMA-LAD widely patent, SVG to LCx known to be  , SVG to PDA ISR 99% (stent failure etiology kerrie intimal hyperplasia)  s/p PCI NI x 1   Ascending aortogram shows only 1 grafts open   Elevated filling pressure LVEDP 30, PWPC 30   Preserved cardiac output and cardiac index 7.7 and 3.5   Pulmonary hypertension mean PA 45   Preserved carole and    Large V wave noted on the wedge position     PLAN:   Dual antiplatelet therapy with aspirin and Plavix for at least the next 1  year, then consider lifelong Plavix   Post PCI EKG and hydration for 4 hours   Cardiac rehab evaluation level 2 followed by level 3   Consider more negative fluid balance and ultrafiltration during dialysis  in the setting of elevated LVEDP   continue EP and heart failure evaluation for advanced heart failure   Repeat echocardiogram in 3 months and continue GDMT      Iveth Ballard MD [facility]   [facility] Interventional Cardiologist  Narrative:   Ost LAD to Prox LAD lesion with 100% stenosis.    Prox RCA lesion with 100% stenosis.    Mid Cx  lesion with 100% stenosis.    Ramus lesion with 50% stenosis.    Ost Cx to Prox Cx lesion with 99% stenosis.    Origin to Prox Graft lesion with 100% stenosis.    Origin to Prox Graft lesion with 99% in-stent restenosis s/p IVUS   guided PCI using NI x 1    CARDIAC CATHETERIZATION REPORT    CARDIAC CATH INDICATION: NSTEMI and new onset cardiomyopathy     PROCEDURES PERFORMED:    Access: Ultrasound-guided right brachial vein access, ultrasound-guided   right common femoral  2.  Coronary angiography  3.  Left heart cath  4.  Ascending aortogram  5.  Right heart catheterization  6.  PCI to the SVG to PDA using NI x 1  7.  IVUS  8.  Perclose closure device to the right common femoral artery  9.  Bypass graft angiogram    PROCEDURE IN DETAILS:      The risks and alternatives of the procedures and conscious sedation were   explained to the patient and informed consent was obtained.The patient   brought to catheterization lab in stable condition.  The right groin was   prepped and draped in the usual sterile fashion. Time out performed.   Versed and Fentanyl given by nursing for moderate sedation, I was present   throughout the procedure and sedation time. A dedicated staff member was   charged with responsibility on the patient's level of consciousness, O2   saturation, end-tidal CO2, blood pressure, and heart rate. 2% lidocaine 10   mL was utilized infiltrate the in Right groin, Under US guidance (image   saved) because of possible Calcium in the artery, the vessel was accessed   using the modified Seldinger technique, using a micropuncture needle,   access was obtained in the right common femoral artery and a  micro sheath   was inserted without complications.  Angiogram was performed in the ROSEN   projection, which demonstrated an adequate access.  This up sized to a   standard 6-Irish sheath.    Under ultrasound guidance micropuncture I accessed the right basilic vein   using modified Seldinger technique via  singlewall cannulation advanced   then 5 Malaysian sheath into the right brachial vein flushed and then   advanced PWP 5 Malaysian catheter advanced the catheter into the chest and   then balloon was inflated then we advanced the catheter into the pulmonary   artery to which position oxygen sample sent for Abdulaziz cardiac output and   cardiac index finally balloon was deflated pulled back the catheter into   the RV followed by the PA followed by the RA finally catheter was removed   then we turned our attention to the left heart catheterization.    Over a wire,  JL4  diagnostic 6 Malaysian catheter used to engage left   coronary artery and imaged. Then exchanged over a wire with JR4 diagnostic   6 Malaysian catheter was used to cross the aortic valve and measure LVEDP,   pulled back to detect any aortic valve gradient. Right coronary artery was   engaged and imaged.  Then we attempted to engage the graft with the JR   which was unsuccessful I used initially 3 DRC as well which was   unsuccessful finally AL 0.75 guide was successful to engage the SVG to the   PDA, we used also AL-1 which was a failure and unsuccessful to engage the   graft we also performed ascending aortogram using pigtail catheter and   power injector device in the INDER projection which shows a patent graft to   the severely stenotic lesion in the in-stent restenosis.  Finally catheter   was removed and decision was made to proceed with intervention to the   in-stent restenosis of the SVG to the PDA.      INTERVENTION INDICATION: Cardiomyopathy EF 20%, NSTEMI, severe ISR 99%   stenosis  INTERVENTIONAL TECHNIQUE:    -The culprit vessel: SVG to the PDA  -DELON flow before: 3  -DELON flow after: 3  -Residual stenosis: 0%    A weight-based bolus dose of IV unfractionated heparin was given to   achieve and maintain an ACT greater than 250 seconds for the duration of   the procedure. ACT was checked to make sure therapeutic level before PCI   started.    AL 0.75 guide was  advanced over wire and used to engage the SVG to PDA  Run-through wire was advanced into the distal PDA    Predilation was performed using 2.5 mm NC balloon then we advanced IVUS to   assess the etiology of the stent failure then we performed scoring NC   high-pressure balloon 3 mm x 10-minute balloon, then we deployed   drug-eluting stents [employer] 3 x 20 mm deployed at nominal   pressure and then postdilated with NC 3 mm based on IVUS measurements to   high-pressure up to 20 albina.  Then we performed IVUS to assess distal edge   of the stent and the stent full expansion finally IVUS was removed the   wire was removed    Final angiography confirmed DELON-3 flow without dissection, distal   embolization or perforation. The patient received aspirin 325 mg and   Plavix 600 mg PO in the cath lab.     Perclose closure device was used with excellent Hemostasis of the right   common femoral artery. Patient tolerated the procedure well and without   complications.He left without any precordial pain. He had 2+ right   dorsalis pedis pulse.  He was transferred to the recovery area in a stable   condition to be monitored. Discussed the findings with the patient and his   family.     -------------------------------------    CORONARY ANATOMY/FINDINGS:    1. Left main artery: Mild disease  2. Left anterior descending artery chronic total occluded proximally  3. Left circumflex chronic total occluded mid with 99% proximally  4.  Ramus intermedius diffuse moderate disease with  5. Right coronary artery: Chronic total occluded proximally     BRIEF HEMODYNAMICS:      Left Heart Cath Pressures:  LV Systolic: 106  LV Begin Diastolic: 7  LV End Diastolic: 30  LV1v PeakdPdt: 625  Ao Systolic: 108  Ao Diastolic: 59  Ao Mean: 80    Right Heart Cath Pressures:  Findings:  Hemodynamics:  1. Systemic blood pressure and heart rate: 202/74 mmHg, 93 bpm  2. Right atrial pressures:     15/15/13 mmHg  3. Right ventricular pressures:     59/11/17 mmHg  4. Pulmonary artery pressures:   65/29/45 mmHg  5. Pulmonary capillary wedge pressures: 32/46/30 mmHg  6. Transpulmonary Gradient:    15  8. Pulmonary Artery Pulsatility Index (CAROLE): 2.8    Saturations Study:  1. Aortic saturations:     91%  3. Pulmonary artery saturations:   58%    Cardiac Outputs calculated by Abdulaziz method:  1. Cardiac output and index:    7.7 L/min, 3.56 L/min/m2  2. Pulmonary vascular resistance:   2.0 Woods Units, 156.9 (dynes x   sec)/cm5  3. Systemic vascular resistance:   8.8 Woods Units, 700.7 (dynes x   sec)/cm5  4. Right ventricular stroke work index:  1,225 (mmHg x ml)/m2  5. Cardiac power output:     2 W    CONCLUSION:   Severe multivessel CAD: LAD, LCx and RCA are chronic total occluded ()  Bypass graft angiogram: LIMA-LAD widely patent, SVG to LCx known to be   , SVG to PDA ISR 99% (stent failure etiology kerrie intimal hyperplasia)   s/p PCI NI x 1  Ascending aortogram shows only 1 grafts open  Elevated filling pressure LVEDP 30, PWPC 30  Preserved cardiac output and cardiac index 7.7 and 3.5  Pulmonary hypertension mean PA 45  Preserved carole and   Large V wave noted on the wedge position    PLAN:  Dual antiplatelet therapy with aspirin and Brilinta for at least the next   1 year, then consider lifelong Plavix  Post PCI EKG and hydration for 4 hours  Cardiac rehab evaluation level 2 followed by level 3  Consider more negative fluid balance and ultrafiltration during dialysis   in the setting of elevated LVEDP  continue EP and heart failure evaluation for advanced heart failure  Repeat echocardiogram in 3 months and continue GDMT     Iveth Ballard MD [facility]  [facility] Interventional Cardiologist           ASSESSMENT:  Bilateral transmetatarsal amputations, newly WBAT BLE  Unsteadiness on feet  DM2 with PN  Morbid obesity (BMI of 40)  Severe multivessel CAD  Calciphylaxis with chronic feet and back pain, followed by pain service and on narcotic pain  medications  ESRD on HD MWF via AVF, transferring outpatient dialysis chair  SVT/multifocal atrial tachycardia  Multivessel CAD s/p CABG  Severe ischemic cardiomyopathy  Acute on chronic combined heart failure  Vtach risk - to use LIfevest on discharge  Fall risk  14. Moderate protein malnutrition with albjmin of 2.7  15. Hyperlipidemia  16. Chronic anemia    PLAN:     Pt seen in HD, she is frustrated being in the hospital - pt does not have transportation to HD thus going to AIR is not recommended unless pt has transportation to HD, pt refusing ERIN, she is requesting to go home - states \" she will figure things out -\" asked PT to see pt again for stairs evaluation - pt states that she has 4 stairs to get out of house - pt stated aunt took her to HD but now she is working and is unavailable - D/w SW -she will meet with pt and discuss options   RN will call  to order custom diabetic shoes with toe fillers   See rehab consult for additional info         TIME SPENT:    I spent 45 minutes on this case with counseling and coordination care       Catrachita MARSHALL  [facility]    Care discussed and overseen by PM&R attending     no

## 2024-06-07 NOTE — H&P ADULT - NSHPLABSRESULTS_GEN_ALL_CORE
VITAL SIGNS: Last 24 Hours  T(C): 36.8 (07 Jun 2024 08:13), Max: 36.8 (07 Jun 2024 01:13)  T(F): 98.2 (07 Jun 2024 08:13), Max: 98.2 (07 Jun 2024 01:13)  HR: 86 (07 Jun 2024 08:13) (86 - 101)  BP: 156/89 (07 Jun 2024 08:13) (141/73 - 188/101)  BP(mean): --  RR: 16 (07 Jun 2024 08:13) (16 - 18)  SpO2: 96% (07 Jun 2024 07:17) (96% - 98%)    LABS:                        14.2   15.62 )-----------( 274      ( 07 Jun 2024 03:10 )             41.7     06-07    135  |  99  |  23<H>  ----------------------------<  281<H>  5.0   |  18  |  1.0    Ca    10.0      07 Jun 2024 03:10    TPro  7.2  /  Alb  4.5  /  TBili  0.4  /  DBili  x   /  AST  32  /  ALT  36  /  AlkPhos  85  06-07      Urinalysis Basic - ( 07 Jun 2024 03:10 )    Color: x / Appearance: x / SG: x / pH: x  Gluc: 281 mg/dL / Ketone: x  / Bili: x / Urobili: x   Blood: x / Protein: x / Nitrite: x   Leuk Esterase: x / RBC: x / WBC x   Sq Epi: x / Non Sq Epi: x / Bacteria: x

## 2024-06-07 NOTE — ED PROVIDER NOTE - PHYSICAL EXAMINATION
CONSTITUTIONAL: Well-developed; well-nourished; in no acute distress.   SKIN: warm, dry  HEAD: Normocephalic  ENT: No nasal discharge; airway clear.  NECK: Supple; non tender.  CARD: S1, S2 normal;  Regular rate and rhythm.   RESP: No wheezes, rales or rhonchi.  ABD: soft ntnd  EXT: Normal ROM.  No clubbing, cyanosis or edema.   NEURO: Alert, oriented, grossly unremarkable  PSYCH: Cooperative, appropriate.

## 2024-06-07 NOTE — H&P ADULT - NSHPPHYSICALEXAM_GEN_ALL_CORE
PHYSICAL EXAM:  CONSTITUTIONAL: No acute distress, well-developed, well-groomed, AAOx3  HEAD: Atraumatic, normocephalic  EYES: EOM intact, PERRLA, conjunctiva and sclera clear  ENT: Supple, no masses, no thyromegaly, no bruits, no JVD; moist mucous membranes  PULMONARY: Clear to auscultation bilaterally; no wheezes, rales, or rhonchi  CARDIOVASCULAR: Regular rate and rhythm; no murmurs, rubs, or gallops  GASTROINTESTINAL: Soft, non-tender, non-distended; bowel sounds present  MUSCULOSKELETAL: 2+ peripheral pulses; no clubbing, no cyanosis, no edema  NEUROLOGY: non-focal  SKIN: No rashes or lesions; warm and d PHYSICAL EXAM:  CONSTITUTIONAL: No acute distress, AAOx3  PULMONARY: Clear to auscultation bilaterally; no wheezes  CARDIOVASCULAR: Regular rate and rhythm;  GASTROINTESTINAL: Soft, tender in epigastric area on deep palpation, non-distended; bowel sounds present  MUSCULOSKELETAL: 2+ peripheral pulses; no edema  NEUROLOGY: non-focal

## 2024-06-07 NOTE — H&P ADULT - ATTENDING COMMENTS
58-year-old female with PMHx of HTN, DM, gastric ulcer?, presenting with burning sensation of chest for 4 days.    #Chest pain  #Palpitations  - Pain reproducible on deep epigastric palpation  - Trop 32 => 54  - EKG showing sinus tachycardia, no acute ischemic changes  - Patient currently asymptomatic  - Sp Plavix 300mg in ED  - Recent calcium score test normal? as per patient  Plan  - Monitor on tele for any arrhythmias  - Serial EKGs in case of chest pain  - Trend Trop  - TTE  - Resume BB (switch bystolic to lopressor)  - Cardio eval Dr Casas  - Get records of recent cardiac tests     #Hx of gastric sleeve  #Hx of gastric ulcer  - Last EGD in chart in 2022 showing: Grade A esophagitis in the gastroesophageal junction compatible with nonspecific erosive esophagitis. (Biopsy). Erythema and congestion in the antrum and stomach body compatible with non-erosive gastritis. (Biopsy). Hiatal Hernia. Normal mucosa in the anterior bulb, distal bulb, first part of the duodenum and second part of the duodenum. (Biopsy).Surgical anatomy of the stomach noted.  - Patient did another upper endoscopy in November 2023 at [facility] showing gastric ulcer?  - Burning chest pain could related to esophagitis/gastritis?  Plan  - c/w home PPI and increase to BID for now  - Consider GI eval if pain does not improve  - Avoid NSAIDs    #HTN urgency  - Symptoms could be related to uncontrolled BP at home?  - BP trending down with no intervention  Plan  - c/w losartan 50mg BID    #Headache- resolved  #Cervical stenosis? sp injection one day ago  - CTH negative   - Headache could be related to elevated BP vs cervicalgia   Plan  - Tylenol PRN for now    #Leukocytosis with neutrophilic shift  - No fever, no symptoms to suggest an active infection  - Could be reactive to injection she had yesterday?  Plan  - Monitor off Abx for now  - Repeat CBC with differential in AM    #Elevated AG gap  - No MERRITT or evidence to suggest DKA  - Could be elevated lactic acid from metformin use   Plan  - Monitor for now    #DM on insulin complicated by diabetic neuropathy  - Last a1c un chart was 90% in 2020  - Glucose elevated on CMP  - Unclear why patient is not on statin  Plan  - c/w home insulin basal-bolus and adjust as needed  - Hold metformin  - a1c  - Lipid profile    #DVT prophylaxis: Lovenox 40  #GI prophylaxis: home PPI  #Activity: IAT  #Diet: DASH, diabetic 58-year-old female with PMHx of HTN, DM, gastric ulcer?, presenting with burning sensation of chest for 4 days.    #Chest pain  #Palpitations  Troponins trending up to 120  EKG showing sinus tachycardia, no acute ischemic changes  Patient currently asymptomatic  Sp Plavix 300mg in ED  Recent calcium score test normal, likely CCTA NC  - Monitor on tele for any arrhythmias  - Continuing trending troponins  - Echo  - Cont BB  - Case discussed with Dr. Casas, pt had stress echo this year which was suboptimal, but normal. recd NST  - Nuclear stress test in AM, NPO @ midnight (severe contrast allergy)    #Hx of gastric sleeve  #Hx of gastric ulcer  Last EGD in chart in 2022 showing: Grade A esophagitis in the gastroesophageal junction compatible with nonspecific erosive esophagitis. (Biopsy). Erythema and congestion in the antrum and stomach body compatible with non-erosive gastritis. (Biopsy). Hiatal Hernia. Normal mucosa in the anterior bulb, distal bulb, first part of the duodenum and second part of the duodenum. (Biopsy).Surgical anatomy of the stomach noted.  Patient reports she did another upper endoscopy in November 2023 at [facility] showing gastric ulcer  Plan  - c/w home PPI and increase to BID for now  - Consider GI eval if pain does not improve and cardiac workup is negative  - Avoid NSAIDs    #HTN urgency  - c/w losartan 50mg BID    #Headache- resolved  #Chronic neck and back pain  CTH negative   Recent had epidural steroid injection to neck  Plan  - Tylenol PRN for now    #DM on insulin complicated by diabetic neuropathy  - Cont insulin regimen    #DVT prophylaxis: Lovenox 40    #Progress Note Handoff  Pending (specify):  Nuclear stress test  Family discussion: dw pt regarding plan of care  Disposition: Home

## 2024-06-07 NOTE — ED ADULT NURSE REASSESSMENT NOTE - NS ED NURSE REASSESS COMMENT FT1
Pt aaox3, awaiting transport to . Pt reports minor headache. Pt denies CP. maintained on cardiac monitoring. PIV in place.

## 2024-06-07 NOTE — ED PROVIDER NOTE - OBJECTIVE STATEMENT
58-year-old female, past medical history of hypertension, diabetes, gastric ulcer, presenting with chest burning that has been ongoing intermittently for 4 days, no alleviating or aggravating factors.  She also notes that she has been having intermittent palpitations for the last 4 months and has upcoming cardiology appointment with Dr. Casas next week possibly for a Holter monitor.  She states she checked her blood pressure at home and it was high and she has a pressure-like headache that resembles prior headaches a little bit more severe in nature.  Denies fevers, chills, shortness of breath, nausea, vomiting, abdominal pain, weakness, numbness.

## 2024-06-08 ENCOUNTER — RESULT REVIEW (OUTPATIENT)
Age: 59
End: 2024-06-08

## 2024-06-08 LAB
A1C WITH ESTIMATED AVERAGE GLUCOSE RESULT: 9.5 % — HIGH (ref 4–5.6)
ANION GAP SERPL CALC-SCNC: 10 MMOL/L — SIGNIFICANT CHANGE UP (ref 7–14)
BASOPHILS # BLD AUTO: 0.05 K/UL — SIGNIFICANT CHANGE UP (ref 0–0.2)
BASOPHILS NFR BLD AUTO: 0.5 % — SIGNIFICANT CHANGE UP (ref 0–1)
BUN SERPL-MCNC: 23 MG/DL — HIGH (ref 10–20)
CALCIUM SERPL-MCNC: 8.9 MG/DL — SIGNIFICANT CHANGE UP (ref 8.4–10.4)
CHLORIDE SERPL-SCNC: 104 MMOL/L — SIGNIFICANT CHANGE UP (ref 98–110)
CO2 SERPL-SCNC: 26 MMOL/L — SIGNIFICANT CHANGE UP (ref 17–32)
CREAT SERPL-MCNC: 0.9 MG/DL — SIGNIFICANT CHANGE UP (ref 0.7–1.5)
EGFR: 73 ML/MIN/1.73M2 — SIGNIFICANT CHANGE UP
EOSINOPHIL # BLD AUTO: 0.07 K/UL — SIGNIFICANT CHANGE UP (ref 0–0.7)
EOSINOPHIL NFR BLD AUTO: 0.8 % — SIGNIFICANT CHANGE UP (ref 0–8)
ESTIMATED AVERAGE GLUCOSE: 226 MG/DL — HIGH (ref 68–114)
GLUCOSE BLDC GLUCOMTR-MCNC: 258 MG/DL — HIGH (ref 70–99)
GLUCOSE BLDC GLUCOMTR-MCNC: 285 MG/DL — HIGH (ref 70–99)
GLUCOSE BLDC GLUCOMTR-MCNC: 324 MG/DL — HIGH (ref 70–99)
GLUCOSE BLDC GLUCOMTR-MCNC: 330 MG/DL — HIGH (ref 70–99)
GLUCOSE SERPL-MCNC: 288 MG/DL — HIGH (ref 70–99)
HCT VFR BLD CALC: 37.1 % — SIGNIFICANT CHANGE UP (ref 37–47)
HGB BLD-MCNC: 12.2 G/DL — SIGNIFICANT CHANGE UP (ref 12–16)
IMM GRANULOCYTES NFR BLD AUTO: 0.2 % — SIGNIFICANT CHANGE UP (ref 0.1–0.3)
LYMPHOCYTES # BLD AUTO: 3.52 K/UL — HIGH (ref 1.2–3.4)
LYMPHOCYTES # BLD AUTO: 38.1 % — SIGNIFICANT CHANGE UP (ref 20.5–51.1)
MAGNESIUM SERPL-MCNC: 1.9 MG/DL — SIGNIFICANT CHANGE UP (ref 1.8–2.4)
MCHC RBC-ENTMCNC: 27.7 PG — SIGNIFICANT CHANGE UP (ref 27–31)
MCHC RBC-ENTMCNC: 32.9 G/DL — SIGNIFICANT CHANGE UP (ref 32–37)
MCV RBC AUTO: 84.1 FL — SIGNIFICANT CHANGE UP (ref 81–99)
MONOCYTES # BLD AUTO: 0.7 K/UL — HIGH (ref 0.1–0.6)
MONOCYTES NFR BLD AUTO: 7.6 % — SIGNIFICANT CHANGE UP (ref 1.7–9.3)
NEUTROPHILS # BLD AUTO: 4.89 K/UL — SIGNIFICANT CHANGE UP (ref 1.4–6.5)
NEUTROPHILS NFR BLD AUTO: 52.8 % — SIGNIFICANT CHANGE UP (ref 42.2–75.2)
NRBC # BLD: 0 /100 WBCS — SIGNIFICANT CHANGE UP (ref 0–0)
PLATELET # BLD AUTO: 192 K/UL — SIGNIFICANT CHANGE UP (ref 130–400)
PMV BLD: 10 FL — SIGNIFICANT CHANGE UP (ref 7.4–10.4)
POTASSIUM SERPL-MCNC: 4.5 MMOL/L — SIGNIFICANT CHANGE UP (ref 3.5–5)
POTASSIUM SERPL-SCNC: 4.5 MMOL/L — SIGNIFICANT CHANGE UP (ref 3.5–5)
RBC # BLD: 4.41 M/UL — SIGNIFICANT CHANGE UP (ref 4.2–5.4)
RBC # FLD: 13.7 % — SIGNIFICANT CHANGE UP (ref 11.5–14.5)
SODIUM SERPL-SCNC: 140 MMOL/L — SIGNIFICANT CHANGE UP (ref 135–146)
TROPONIN T, HIGH SENSITIVITY RESULT: 121 NG/L — CRITICAL HIGH (ref 6–13)
TROPONIN T, HIGH SENSITIVITY RESULT: 139 NG/L — CRITICAL HIGH (ref 6–13)
WBC # BLD: 9.25 K/UL — SIGNIFICANT CHANGE UP (ref 4.8–10.8)
WBC # FLD AUTO: 9.25 K/UL — SIGNIFICANT CHANGE UP (ref 4.8–10.8)

## 2024-06-08 PROCEDURE — 93018 CV STRESS TEST I&R ONLY: CPT

## 2024-06-08 PROCEDURE — 99233 SBSQ HOSP IP/OBS HIGH 50: CPT

## 2024-06-08 PROCEDURE — 93306 TTE W/DOPPLER COMPLETE: CPT | Mod: 26

## 2024-06-08 PROCEDURE — 93016 CV STRESS TEST SUPVJ ONLY: CPT

## 2024-06-08 PROCEDURE — 78452 HT MUSCLE IMAGE SPECT MULT: CPT | Mod: 26

## 2024-06-08 RX ORDER — INSULIN GLARGINE 100 [IU]/ML
58 INJECTION, SOLUTION SUBCUTANEOUS AT BEDTIME
Refills: 0 | Status: DISCONTINUED | OUTPATIENT
Start: 2024-06-08 | End: 2024-06-11

## 2024-06-08 RX ADMIN — NEBIVOLOL HYDROCHLORIDE 5 MILLIGRAM(S): 5 TABLET ORAL at 21:39

## 2024-06-08 RX ADMIN — LOSARTAN POTASSIUM 50 MILLIGRAM(S): 100 TABLET, FILM COATED ORAL at 17:34

## 2024-06-08 RX ADMIN — PANTOPRAZOLE SODIUM 40 MILLIGRAM(S): 20 TABLET, DELAYED RELEASE ORAL at 17:34

## 2024-06-08 RX ADMIN — Medication 22 UNIT(S): at 16:54

## 2024-06-08 RX ADMIN — LOSARTAN POTASSIUM 50 MILLIGRAM(S): 100 TABLET, FILM COATED ORAL at 05:02

## 2024-06-08 RX ADMIN — Medication 4: at 16:53

## 2024-06-08 RX ADMIN — PANTOPRAZOLE SODIUM 40 MILLIGRAM(S): 20 TABLET, DELAYED RELEASE ORAL at 05:02

## 2024-06-08 RX ADMIN — ENOXAPARIN SODIUM 40 MILLIGRAM(S): 100 INJECTION SUBCUTANEOUS at 11:53

## 2024-06-08 RX ADMIN — INSULIN GLARGINE 58 UNIT(S): 100 INJECTION, SOLUTION SUBCUTANEOUS at 21:40

## 2024-06-08 NOTE — PROGRESS NOTE ADULT - ASSESSMENT
58-year-old female with PMHx of HTN, DM, gastric ulcer?, presenting with burning sensation of chest for 4 days.    #Chest pain  #Palpitations  Troponins trending up to 120  EKG showing sinus tachycardia, no acute ischemic changes  Patient currently asymptomatic  Sp Plavix 300mg in ED  Recent calcium score test normal, likely CCTA NC  - Monitor on tele for any arrhythmias  - Continuing trending troponins  - Echo  - Cont BB  - Case discussed with Dr. Casas, pt had stress echo this year which was suboptimal, but normal. recd NST  - Nuclear stress test in AM, NPO @ midnight (severe contrast allergy)    #Hx of gastric sleeve  #Hx of gastric ulcer  Last EGD in chart in 2022 showing: Grade A esophagitis in the gastroesophageal junction compatible with nonspecific erosive esophagitis. (Biopsy). Erythema and congestion in the antrum and stomach body compatible with non-erosive gastritis. (Biopsy). Hiatal Hernia. Normal mucosa in the anterior bulb, distal bulb, first part of the duodenum and second part of the duodenum. (Biopsy).Surgical anatomy of the stomach noted.  Patient reports she did another upper endoscopy in November 2023 at [facility] showing gastric ulcer  Plan  - c/w home PPI and increase to BID for now  - Consider GI eval if pain does not improve and cardiac workup is negative  - Avoid NSAIDs    #HTN urgency  - c/w losartan 50mg BID    #Headache- resolved  #Chronic neck and back pain  CTH negative   Recent had epidural steroid injection to neck  Plan  - Tylenol PRN for now    #DM on insulin complicated by diabetic neuropathy  - Cont insulin regimen, increased lantus to 58    #DVT prophylaxis: Lovenox 40    #Progress Note Handoff  Pending (specify):  Nuclear stress test

## 2024-06-08 NOTE — PROGRESS NOTE ADULT - SUBJECTIVE AND OBJECTIVE BOX
24H events:    Patient is a 58y old Female who presents with a chief complaint of Chest pain (07 Jun 2024 19:13)    Primary diagnosis of NSTEMI (non-ST elevation myocardial infarction)    Family communication:  Contact date:  Name of person contacted:  Relationship to patient:  Communication details:  What matters most:    PAST MEDICAL & SURGICAL HISTORY  Uterine cancer  1999    DM (diabetes mellitus)    HTN (hypertension)    Obese    Pneumonia  2012 post gastric sleeve    H/O pneumothorax  2012 complications post  gastric sleeve    Ureteral stone    Hydronephrosis, left    S/P hysterectomy  1999, total    History of cholecystectomy    S/P gastric surgery  SLEEVE 2012 -post op complications. ICU X MANY WKS PER PT    S/P gastric surgery  2000 BAND PROCEDURE & REMOVED    H/O hernia repair    SOCIAL HISTORY:  Social History:  Non smoker  No alcohol abuse (07 Jun 2024 08:47)    ALLERGIES:  Benadryl (Hives; Urticaria)  IV Contrast (Anaphylaxis)  latex (Anaphylaxis; Rash; Hives)  nonsteroidal anti-inflammatory agents (Blisters (Mild to Mod))  shellfish (Anaphylaxis)  iodine (Rash; Urticaria; Hives)  penicillins (Anaphylaxis)  sulfa drugs (Anaphylaxis)    MEDICATIONS:  STANDING MEDICATIONS  dextrose 50% Injectable 25 Gram(s) IV Push once  dextrose 50% Injectable 12.5 Gram(s) IV Push once  enoxaparin Injectable 40 milliGRAM(s) SubCutaneous every 24 hours  insulin glargine Injectable (LANTUS) 58 Unit(s) SubCutaneous at bedtime  insulin lispro (ADMELOG) corrective regimen sliding scale   SubCutaneous three times a day before meals  insulin lispro Injectable (ADMELOG) 22 Unit(s) SubCutaneous three times a day before meals  losartan 50 milliGRAM(s) Oral every 12 hours  nebivolol 5 milliGRAM(s) Oral at bedtime  pantoprazole    Tablet 40 milliGRAM(s) Oral every 12 hours  regadenoson Injectable 0.4 milliGRAM(s) IV Push once    PRN MEDICATIONS  dextrose Oral Gel 15 Gram(s) Oral once PRN    VITALS:   T(F): 97.8  HR: 74  BP: 153/77  RR: 18  SpO2: 98%    PHYSICAL EXAM:  GENERAL:   (x  ) NAD, lying in bed comfortably     (  ) obtunded     (  ) lethargic     (  ) somnolent    HEAD:   (x  ) Atraumatic     (  ) hematoma     (  ) laceration (specify location:       )     NECK:  ( x ) Supple     (  ) neck stiffness     (  ) nuchal rigidity     (  )  no JVD     (  ) JVD present ( -- cm)    HEART:  Rate -->     (x  ) normal rate     (  ) bradycardic     (  ) tachycardic  Rhythm -->     ( x ) regular     (  ) regularly irregular     (  ) irregularly irregular  Murmurs -->     (  ) normal s1s2     (  ) systolic murmur     (  ) diastolic murmur     (  ) continuous murmur      (  ) S3 present     (  ) S4 present    LUNGS:   (x  )Unlabored respirations     (  ) tachypnea  ( x B/L air entry     (  ) decreased breath sounds in:  (location     )    (  ) no adventitious sound     (  ) crackles     (  ) wheezing      (  ) rhonchi      (specify location:       )  (  ) chest wall tenderness (specify location:       )    ABDOMEN:   (  x) Soft     (  ) tense   |   (  ) nondistended     (  ) distended   |   (  ) +BS     (  ) hypoactive bowel sounds     (  ) hyperactive bowel sounds  (  ) nontender     (  ) RUQ tenderness     (  ) RLQ tenderness     (  ) LLQ tenderness     (  ) epigastric tenderness     (  ) diffuse tenderness  (  ) Splenomegaly      (  ) Hepatomegaly      (  ) Jaundice     (  ) ecchymosis     EXTREMITIES:  ( x ) Normal     (  ) Rash     (  ) ecchymosis     (  ) varicose veins      (  ) pitting edema     (  ) non-pitting edema   (  ) ulceration     (  ) gangrene:     (location:     )    NERVOUS SYSTEM:    ( x ) A&Ox3     (  ) confused     (  ) lethargic  CN II-XII:     (  ) Intact     (  ) deficits found     (Specify:     )   Upper extremities:     (  ) no sensorimotor deficits     (  ) weakness     (  ) loss of proprioception/vibration     (  ) loss of touch/temperature (specify:    )  Lower extremities:     (  ) no sensorimotor deficits     (  ) weakness     (  ) loss of proprioception/vibration     (  ) loss of touch/temperature (specify:    )    LABS:                        12.2   9.25  )-----------( 192      ( 08 Jun 2024 07:30 )             37.1     06-08    140  |  104  |  23<H>  ----------------------------<  288<H>  4.5   |  26  |  0.9    Ca    8.9      08 Jun 2024 07:30  Mg     1.9     06-08    TPro  7.2  /  Alb  4.5  /  TBili  0.4  /  DBili  x   /  AST  32  /  ALT  36  /  AlkPhos  85  06-07    Urinalysis Basic - ( 08 Jun 2024 07:30 )    Color: x / Appearance: x / SG: x / pH: x  Gluc: 288 mg/dL / Ketone: x  / Bili: x / Urobili: x   Blood: x / Protein: x / Nitrite: x   Leuk Esterase: x / RBC: x / WBC x   Sq Epi: x / Non Sq Epi: x / Bacteria: x

## 2024-06-09 LAB
ALBUMIN SERPL ELPH-MCNC: 3.9 G/DL — SIGNIFICANT CHANGE UP (ref 3.5–5.2)
ALP SERPL-CCNC: 62 U/L — SIGNIFICANT CHANGE UP (ref 30–115)
ALT FLD-CCNC: 30 U/L — SIGNIFICANT CHANGE UP (ref 0–41)
ANION GAP SERPL CALC-SCNC: 11 MMOL/L — SIGNIFICANT CHANGE UP (ref 7–14)
AST SERPL-CCNC: 26 U/L — SIGNIFICANT CHANGE UP (ref 0–41)
BILIRUB SERPL-MCNC: 0.3 MG/DL — SIGNIFICANT CHANGE UP (ref 0.2–1.2)
BUN SERPL-MCNC: 15 MG/DL — SIGNIFICANT CHANGE UP (ref 10–20)
CALCIUM SERPL-MCNC: 8.9 MG/DL — SIGNIFICANT CHANGE UP (ref 8.4–10.5)
CHLORIDE SERPL-SCNC: 103 MMOL/L — SIGNIFICANT CHANGE UP (ref 98–110)
CO2 SERPL-SCNC: 27 MMOL/L — SIGNIFICANT CHANGE UP (ref 17–32)
CREAT SERPL-MCNC: 0.7 MG/DL — SIGNIFICANT CHANGE UP (ref 0.7–1.5)
EGFR: 99 ML/MIN/1.73M2 — SIGNIFICANT CHANGE UP
GLUCOSE BLDC GLUCOMTR-MCNC: 150 MG/DL — HIGH (ref 70–99)
GLUCOSE BLDC GLUCOMTR-MCNC: 157 MG/DL — HIGH (ref 70–99)
GLUCOSE BLDC GLUCOMTR-MCNC: 248 MG/DL — HIGH (ref 70–99)
GLUCOSE BLDC GLUCOMTR-MCNC: 271 MG/DL — HIGH (ref 70–99)
GLUCOSE SERPL-MCNC: 247 MG/DL — HIGH (ref 70–99)
HCT VFR BLD CALC: 36.9 % — LOW (ref 37–47)
HGB BLD-MCNC: 12.3 G/DL — SIGNIFICANT CHANGE UP (ref 12–16)
MCHC RBC-ENTMCNC: 27.6 PG — SIGNIFICANT CHANGE UP (ref 27–31)
MCHC RBC-ENTMCNC: 33.3 G/DL — SIGNIFICANT CHANGE UP (ref 32–37)
MCV RBC AUTO: 82.7 FL — SIGNIFICANT CHANGE UP (ref 81–99)
NRBC # BLD: 0 /100 WBCS — SIGNIFICANT CHANGE UP (ref 0–0)
PLATELET # BLD AUTO: 215 K/UL — SIGNIFICANT CHANGE UP (ref 130–400)
PMV BLD: 10.1 FL — SIGNIFICANT CHANGE UP (ref 7.4–10.4)
POTASSIUM SERPL-MCNC: 4.2 MMOL/L — SIGNIFICANT CHANGE UP (ref 3.5–5)
POTASSIUM SERPL-SCNC: 4.2 MMOL/L — SIGNIFICANT CHANGE UP (ref 3.5–5)
PROT SERPL-MCNC: 6 G/DL — SIGNIFICANT CHANGE UP (ref 6–8)
RBC # BLD: 4.46 M/UL — SIGNIFICANT CHANGE UP (ref 4.2–5.4)
RBC # FLD: 13.4 % — SIGNIFICANT CHANGE UP (ref 11.5–14.5)
SODIUM SERPL-SCNC: 141 MMOL/L — SIGNIFICANT CHANGE UP (ref 135–146)
WBC # BLD: 7.22 K/UL — SIGNIFICANT CHANGE UP (ref 4.8–10.8)
WBC # FLD AUTO: 7.22 K/UL — SIGNIFICANT CHANGE UP (ref 4.8–10.8)

## 2024-06-09 PROCEDURE — 99233 SBSQ HOSP IP/OBS HIGH 50: CPT

## 2024-06-09 RX ORDER — CLOPIDOGREL BISULFATE 75 MG/1
75 TABLET, FILM COATED ORAL DAILY
Refills: 0 | Status: DISCONTINUED | OUTPATIENT
Start: 2024-06-09 | End: 2024-06-10

## 2024-06-09 RX ORDER — ATORVASTATIN CALCIUM 80 MG/1
80 TABLET, FILM COATED ORAL AT BEDTIME
Refills: 0 | Status: DISCONTINUED | OUTPATIENT
Start: 2024-06-09 | End: 2024-06-11

## 2024-06-09 RX ORDER — ALPRAZOLAM 0.25 MG
0.25 TABLET ORAL ONCE
Refills: 0 | Status: DISCONTINUED | OUTPATIENT
Start: 2024-06-09 | End: 2024-06-09

## 2024-06-09 RX ADMIN — Medication 60 MILLIGRAM(S): at 17:45

## 2024-06-09 RX ADMIN — Medication 0.25 MILLIGRAM(S): at 18:34

## 2024-06-09 RX ADMIN — CLOPIDOGREL BISULFATE 75 MILLIGRAM(S): 75 TABLET, FILM COATED ORAL at 11:50

## 2024-06-09 RX ADMIN — LOSARTAN POTASSIUM 50 MILLIGRAM(S): 100 TABLET, FILM COATED ORAL at 05:19

## 2024-06-09 RX ADMIN — PANTOPRAZOLE SODIUM 40 MILLIGRAM(S): 20 TABLET, DELAYED RELEASE ORAL at 17:46

## 2024-06-09 RX ADMIN — PANTOPRAZOLE SODIUM 40 MILLIGRAM(S): 20 TABLET, DELAYED RELEASE ORAL at 05:19

## 2024-06-09 RX ADMIN — Medication 22 UNIT(S): at 07:58

## 2024-06-09 RX ADMIN — Medication 3: at 11:49

## 2024-06-09 RX ADMIN — Medication 60 MILLIGRAM(S): at 23:17

## 2024-06-09 RX ADMIN — Medication 22 UNIT(S): at 11:49

## 2024-06-09 RX ADMIN — ENOXAPARIN SODIUM 40 MILLIGRAM(S): 100 INJECTION SUBCUTANEOUS at 11:49

## 2024-06-09 RX ADMIN — NEBIVOLOL HYDROCHLORIDE 5 MILLIGRAM(S): 5 TABLET ORAL at 21:32

## 2024-06-09 RX ADMIN — ATORVASTATIN CALCIUM 80 MILLIGRAM(S): 80 TABLET, FILM COATED ORAL at 21:31

## 2024-06-09 RX ADMIN — LOSARTAN POTASSIUM 50 MILLIGRAM(S): 100 TABLET, FILM COATED ORAL at 17:45

## 2024-06-09 RX ADMIN — Medication 2: at 07:57

## 2024-06-09 RX ADMIN — Medication 22 UNIT(S): at 16:45

## 2024-06-09 NOTE — PROGRESS NOTE ADULT - SUBJECTIVE AND OBJECTIVE BOX
MAGGI BHAGAT  58y, Female  Allergy: Benadryl (Hives; Urticaria)  IV Contrast (Anaphylaxis)  latex (Anaphylaxis; Rash; Hives)  nonsteroidal anti-inflammatory agents (Blisters (Mild to Mod))  shellfish (Anaphylaxis)  iodine (Rash; Urticaria; Hives)  penicillins (Anaphylaxis)  sulfa drugs (Anaphylaxis)    Hospital Day: 2d    Patient seen and examined. No acute events overnight    PMH/PSH:  PAST MEDICAL & SURGICAL HISTORY:  Uterine cancer  1999      DM (diabetes mellitus)      HTN (hypertension)      Obese      Pneumonia  2012 post gastric sleeve      H/O pneumothorax  2012 complications post  gastric sleeve      Ureteral stone      Hydronephrosis, left      S/P hysterectomy  1999, total      History of cholecystectomy      S/P gastric surgery  SLEEVE 2012 -post op complications. ICU X MANY WKS PER PT      S/P gastric surgery  2000 BAND PROCEDURE & REMOVED      H/O hernia repair          VITALS:  T(F): 97.7 (06-09-24 @ 04:26), Max: 97.7 (06-09-24 @ 04:26)  HR: 63 (06-09-24 @ 04:26)  BP: 172/80 (06-09-24 @ 04:26) (123/76 - 172/80)  RR: 18 (06-09-24 @ 04:26)  SpO2: 100% (06-08-24 @ 12:46)    TESTS & MEASUREMENTS:  Weight (Kg):   BMI (kg/m2): 37.4 (06-07)    06-07-24 @ 07:01  -  06-08-24 @ 07:00  --------------------------------------------------------  IN: 480 mL / OUT: 1325 mL / NET: -845 mL    06-08-24 @ 07:01  -  06-09-24 @ 07:00  --------------------------------------------------------  IN: 450 mL / OUT: 0 mL / NET: 450 mL                            12.3   7.22  )-----------( 215      ( 09 Jun 2024 05:42 )             36.9       06-09    141  |  103  |  15  ----------------------------<  247<H>  4.2   |  27  |  0.7    Ca    8.9      09 Jun 2024 05:42  Mg     1.9     06-08    TPro  6.0  /  Alb  3.9  /  TBili  0.3  /  DBili  x   /  AST  26  /  ALT  30  /  AlkPhos  62  06-09    LIVER FUNCTIONS - ( 09 Jun 2024 05:42 )  Alb: 3.9 g/dL / Pro: 6.0 g/dL / ALK PHOS: 62 U/L / ALT: 30 U/L / AST: 26 U/L / GGT: x                 Urinalysis Basic - ( 09 Jun 2024 05:42 )    Color: x / Appearance: x / SG: x / pH: x  Gluc: 247 mg/dL / Ketone: x  / Bili: x / Urobili: x   Blood: x / Protein: x / Nitrite: x   Leuk Esterase: x / RBC: x / WBC x   Sq Epi: x / Non Sq Epi: x / Bacteria: x        RADIOLOGY & ADDITIONAL TESTS:    RECENT DIAGNOSTIC ORDERS:      MEDICATIONS:  MEDICATIONS  (STANDING):  atorvastatin 80 milliGRAM(s) Oral at bedtime  clopidogrel Tablet 75 milliGRAM(s) Oral daily  dextrose 50% Injectable 25 Gram(s) IV Push once  dextrose 50% Injectable 12.5 Gram(s) IV Push once  enoxaparin Injectable 40 milliGRAM(s) SubCutaneous every 24 hours  insulin glargine Injectable (LANTUS) 58 Unit(s) SubCutaneous at bedtime  insulin lispro (ADMELOG) corrective regimen sliding scale   SubCutaneous three times a day before meals  insulin lispro Injectable (ADMELOG) 22 Unit(s) SubCutaneous three times a day before meals  losartan 50 milliGRAM(s) Oral every 12 hours  nebivolol 5 milliGRAM(s) Oral at bedtime  pantoprazole    Tablet 40 milliGRAM(s) Oral every 12 hours  regadenoson Injectable 0.4 milliGRAM(s) IV Push once    MEDICATIONS  (PRN):  dextrose Oral Gel 15 Gram(s) Oral once PRN Blood Glucose LESS THAN 70 milliGRAM(s)/deciliter      HOME MEDICATIONS:  Bystolic 5 mg oral tablet (06-07)  Levemir FlexPen 100 units/mL subcutaneous solution (06-07)  losartan 50 mg oral tablet (06-07)  metFORMIN 1000 mg oral tablet (06-07)  NovoLOG FlexPen 100 units/mL injectable solution (06-07)  omeprazole 40 mg oral delayed release capsule (06-07)      REVIEW OF SYSTEMS:  All other review of systems is negative unless indicated above.     PHYSICAL EXAM:  PHYSICAL EXAM:  GENERAL: NAD  HEAD:  Atraumatic, Normocephalic  NECK: Supple, No JVD  CHEST/LUNG: Clear to auscultation bilaterally; No wheeze  HEART: Regular rate and rhythm; No murmurs, rubs, or gallops  ABDOMEN: Soft, Nontender, Nondistended; Bowel sounds present  EXTREMITIES:  2+ Peripheral Pulses, No clubbing, cyanosis, or edema  SKIN: No rashes or lesions

## 2024-06-09 NOTE — PROGRESS NOTE ADULT - ASSESSMENT
58-year-old female with PMHx of HTN, DM, gastric ulcer?, presenting with burning sensation of chest for 4 days.    #Chest pain  #Palpitations  EKG showing sinus tachycardia, no acute ischemic changes  Pt has intermittent chest burning pain  Sp Plavix 300mg in ED  Recent calcium score test normal, likely CCTA NC  dw cardiology  Troponins peaked at 140  NST 06/08: Moderate sized defect on anterior wall of LV  Echo 06/08 55-60% EF; grade 1 diastolic dysfunction  - Monitor on tele for any arrhythmias  - Continuing trending troponins  - Cont BB  - Cont plavix 75mg qD  - Start lipitor 80mg qHs  - f/u cardiology for further ischemic workup. May need CCU given severe contrast allergy for close monitoring after C    #Hx of gastric sleeve  #Hx of gastric ulcer  Last EGD in chart in 2022 showing: Grade A esophagitis in the gastroesophageal junction compatible with nonspecific erosive esophagitis. (Biopsy). Erythema and congestion in the antrum and stomach body compatible with non-erosive gastritis. (Biopsy). Hiatal Hernia. Normal mucosa in the anterior bulb, distal bulb, first part of the duodenum and second part of the duodenum. (Biopsy).Surgical anatomy of the stomach noted.  Patient reports she did another upper endoscopy in November 2023 at [facility] showing gastric ulcer  Plan  - c/w home PPI , increased to BID on this admission  - Avoid NSAIDs    #HTN urgency  - c/w losartan 50mg BID    #Headache- resolved  #Chronic neck and back pain  CTH negative   Recent had epidural steroid injection to neck  Plan  - Tylenol PRN for now    #DM on insulin complicated by diabetic neuropathy  - Cont insulin regimen    #DVT prophylaxis: Lovenox 40    #Progress Note Handoff  Pending (specify):  Cardio f/u  Family discussion: junito pt regarding plan of care  Disposition: Home.  58-year-old female with PMHx of HTN, DM, gastric ulcer?, presenting with burning sensation of chest for 4 days.    #Chest pain  #Palpitations  EKG showing sinus tachycardia, no acute ischemic changes  Pt has intermittent chest burning pain  Sp Plavix 300mg in ED  Recent calcium score test normal, likely CCTA NC  dw cardiology  Troponins peaked at 140  NST 06/08: Moderate sized defect on anterior wall of LV  Echo 06/08 55-60% EF; grade 1 diastolic dysfunction  - Monitor on tele for any arrhythmias  - Continuing trending troponins  - Cont BB  - Cont plavix 75mg qD  - Start lipitor 80mg qHs  - dw cardiology, plan for [facility] tomorrow with anesthesiology backup. Will begin premedicating patient tonight.    #Hx of gastric sleeve  #Hx of gastric ulcer  Last EGD in chart in 2022 showing: Grade A esophagitis in the gastroesophageal junction compatible with nonspecific erosive esophagitis. (Biopsy). Erythema and congestion in the antrum and stomach body compatible with non-erosive gastritis. (Biopsy). Hiatal Hernia. Normal mucosa in the anterior bulb, distal bulb, first part of the duodenum and second part of the duodenum. (Biopsy).Surgical anatomy of the stomach noted.  Patient reports she did another upper endoscopy in November 2023 at [facility] showing gastric ulcer  Plan  - c/w home PPI , increased to BID on this admission  - Avoid NSAIDs    #HTN urgency  - c/w losartan 50mg BID    #Headache- resolved  #Chronic neck and back pain  CTH negative   Recent had epidural steroid injection to neck  Plan  - Tylenol PRN for now    #DM on insulin complicated by diabetic neuropathy  - Cont insulin regimen    #DVT prophylaxis: Lovenox 40    #Progress Note Handoff  Pending (specify):  Cardio f/u  Family discussion: junito pt regarding plan of care  Disposition: Home.

## 2024-06-10 LAB
ANION GAP SERPL CALC-SCNC: 15 MMOL/L — HIGH (ref 7–14)
ANION GAP SERPL CALC-SCNC: 19 MMOL/L — HIGH (ref 7–14)
APTT BLD: 30.3 SEC — SIGNIFICANT CHANGE UP (ref 27–39.2)
BUN SERPL-MCNC: 14 MG/DL — SIGNIFICANT CHANGE UP (ref 10–20)
BUN SERPL-MCNC: 14 MG/DL — SIGNIFICANT CHANGE UP (ref 10–20)
CALCIUM SERPL-MCNC: 10.1 MG/DL — SIGNIFICANT CHANGE UP (ref 8.4–10.5)
CALCIUM SERPL-MCNC: 9.2 MG/DL — SIGNIFICANT CHANGE UP (ref 8.4–10.5)
CHLORIDE SERPL-SCNC: 102 MMOL/L — SIGNIFICANT CHANGE UP (ref 98–110)
CHLORIDE SERPL-SCNC: 99 MMOL/L — SIGNIFICANT CHANGE UP (ref 98–110)
CO2 SERPL-SCNC: 21 MMOL/L — SIGNIFICANT CHANGE UP (ref 17–32)
CO2 SERPL-SCNC: 21 MMOL/L — SIGNIFICANT CHANGE UP (ref 17–32)
CREAT SERPL-MCNC: 0.6 MG/DL — LOW (ref 0.7–1.5)
CREAT SERPL-MCNC: 0.8 MG/DL — SIGNIFICANT CHANGE UP (ref 0.7–1.5)
EGFR: 103 ML/MIN/1.73M2 — SIGNIFICANT CHANGE UP
EGFR: 84 ML/MIN/1.73M2 — SIGNIFICANT CHANGE UP
GLUCOSE BLDC GLUCOMTR-MCNC: 263 MG/DL — HIGH (ref 70–99)
GLUCOSE BLDC GLUCOMTR-MCNC: 270 MG/DL — HIGH (ref 70–99)
GLUCOSE BLDC GLUCOMTR-MCNC: 302 MG/DL — HIGH (ref 70–99)
GLUCOSE BLDC GLUCOMTR-MCNC: 309 MG/DL — HIGH (ref 70–99)
GLUCOSE SERPL-MCNC: 333 MG/DL — HIGH (ref 70–99)
GLUCOSE SERPL-MCNC: 339 MG/DL — HIGH (ref 70–99)
HCT VFR BLD CALC: 46.2 % — SIGNIFICANT CHANGE UP (ref 37–47)
HGB BLD-MCNC: 15.5 G/DL — SIGNIFICANT CHANGE UP (ref 12–16)
INR BLD: 0.98 RATIO — SIGNIFICANT CHANGE UP (ref 0.65–1.3)
MAGNESIUM SERPL-MCNC: 2 MG/DL — SIGNIFICANT CHANGE UP (ref 1.8–2.4)
MCHC RBC-ENTMCNC: 27.9 PG — SIGNIFICANT CHANGE UP (ref 27–31)
MCHC RBC-ENTMCNC: 33.5 G/DL — SIGNIFICANT CHANGE UP (ref 32–37)
MCV RBC AUTO: 83.1 FL — SIGNIFICANT CHANGE UP (ref 81–99)
NRBC # BLD: 0 /100 WBCS — SIGNIFICANT CHANGE UP (ref 0–0)
PLATELET # BLD AUTO: 299 K/UL — SIGNIFICANT CHANGE UP (ref 130–400)
PMV BLD: 10.1 FL — SIGNIFICANT CHANGE UP (ref 7.4–10.4)
POTASSIUM SERPL-MCNC: 4.6 MMOL/L — SIGNIFICANT CHANGE UP (ref 3.5–5)
POTASSIUM SERPL-MCNC: 4.6 MMOL/L — SIGNIFICANT CHANGE UP (ref 3.5–5)
POTASSIUM SERPL-SCNC: 4.6 MMOL/L — SIGNIFICANT CHANGE UP (ref 3.5–5)
POTASSIUM SERPL-SCNC: 4.6 MMOL/L — SIGNIFICANT CHANGE UP (ref 3.5–5)
PROTHROM AB SERPL-ACNC: 11.2 SEC — SIGNIFICANT CHANGE UP (ref 9.95–12.87)
RBC # BLD: 5.56 M/UL — HIGH (ref 4.2–5.4)
RBC # FLD: 13.5 % — SIGNIFICANT CHANGE UP (ref 11.5–14.5)
SODIUM SERPL-SCNC: 138 MMOL/L — SIGNIFICANT CHANGE UP (ref 135–146)
SODIUM SERPL-SCNC: 139 MMOL/L — SIGNIFICANT CHANGE UP (ref 135–146)
WBC # BLD: 16.66 K/UL — HIGH (ref 4.8–10.8)
WBC # FLD AUTO: 16.66 K/UL — HIGH (ref 4.8–10.8)

## 2024-06-10 PROCEDURE — 93010 ELECTROCARDIOGRAM REPORT: CPT

## 2024-06-10 PROCEDURE — 92928 PRQ TCAT PLMT NTRAC ST 1 LES: CPT | Mod: LC

## 2024-06-10 PROCEDURE — 99233 SBSQ HOSP IP/OBS HIGH 50: CPT

## 2024-06-10 PROCEDURE — 93458 L HRT ARTERY/VENTRICLE ANGIO: CPT | Mod: 26

## 2024-06-10 RX ORDER — TICAGRELOR 90 MG/1
1 TABLET ORAL
Qty: 60 | Refills: 3
Start: 2024-06-10 | End: 2024-10-07

## 2024-06-10 RX ORDER — EPINEPHRINE 0.3 MG/.3ML
0.3 INJECTION INTRAMUSCULAR; SUBCUTANEOUS ONCE
Refills: 0 | Status: DISCONTINUED | OUTPATIENT
Start: 2024-06-10 | End: 2024-06-11

## 2024-06-10 RX ORDER — HYDROCORTISONE 20 MG
200 TABLET ORAL ONCE
Refills: 0 | Status: COMPLETED | OUTPATIENT
Start: 2024-06-10 | End: 2024-06-10

## 2024-06-10 RX ORDER — HYDROCORTISONE 20 MG
100 TABLET ORAL ONCE
Refills: 0 | Status: COMPLETED | OUTPATIENT
Start: 2024-06-10 | End: 2024-06-10

## 2024-06-10 RX ORDER — SODIUM CHLORIDE 9 MG/ML
1000 INJECTION INTRAMUSCULAR; INTRAVENOUS; SUBCUTANEOUS
Refills: 0 | Status: DISCONTINUED | OUTPATIENT
Start: 2024-06-10 | End: 2024-06-11

## 2024-06-10 RX ORDER — FAMOTIDINE 10 MG/ML
20 INJECTION INTRAVENOUS ONCE
Refills: 0 | Status: COMPLETED | OUTPATIENT
Start: 2024-06-10 | End: 2024-06-10

## 2024-06-10 RX ORDER — HYDROCORTISONE 20 MG
60 TABLET ORAL ONCE
Refills: 0 | Status: COMPLETED | OUTPATIENT
Start: 2024-06-10 | End: 2024-06-10

## 2024-06-10 RX ORDER — SODIUM CHLORIDE 9 MG/ML
1000 INJECTION INTRAMUSCULAR; INTRAVENOUS; SUBCUTANEOUS
Refills: 0 | Status: DISCONTINUED | OUTPATIENT
Start: 2024-06-10 | End: 2024-06-10

## 2024-06-10 RX ORDER — SODIUM CHLORIDE 9 MG/ML
250 INJECTION INTRAMUSCULAR; INTRAVENOUS; SUBCUTANEOUS ONCE
Refills: 0 | Status: COMPLETED | OUTPATIENT
Start: 2024-06-10 | End: 2024-06-10

## 2024-06-10 RX ORDER — TICAGRELOR 90 MG/1
90 TABLET ORAL EVERY 12 HOURS
Refills: 0 | Status: DISCONTINUED | OUTPATIENT
Start: 2024-06-10 | End: 2024-06-11

## 2024-06-10 RX ORDER — ALPRAZOLAM 0.25 MG
0.5 TABLET ORAL ONCE
Refills: 0 | Status: DISCONTINUED | OUTPATIENT
Start: 2024-06-10 | End: 2024-06-10

## 2024-06-10 RX ADMIN — LOSARTAN POTASSIUM 50 MILLIGRAM(S): 100 TABLET, FILM COATED ORAL at 17:50

## 2024-06-10 RX ADMIN — Medication 22 UNIT(S): at 10:34

## 2024-06-10 RX ADMIN — Medication 0.5 MILLIGRAM(S): at 05:08

## 2024-06-10 RX ADMIN — LOSARTAN POTASSIUM 50 MILLIGRAM(S): 100 TABLET, FILM COATED ORAL at 05:08

## 2024-06-10 RX ADMIN — SODIUM CHLORIDE 75 MILLILITER(S): 9 INJECTION INTRAMUSCULAR; INTRAVENOUS; SUBCUTANEOUS at 07:38

## 2024-06-10 RX ADMIN — Medication 4: at 10:35

## 2024-06-10 RX ADMIN — Medication 200 MILLIGRAM(S): at 07:38

## 2024-06-10 RX ADMIN — SODIUM CHLORIDE 500 MILLILITER(S): 9 INJECTION INTRAMUSCULAR; INTRAVENOUS; SUBCUTANEOUS at 07:21

## 2024-06-10 RX ADMIN — Medication 60 MILLIGRAM(S): at 22:08

## 2024-06-10 RX ADMIN — TICAGRELOR 90 MILLIGRAM(S): 90 TABLET ORAL at 17:50

## 2024-06-10 RX ADMIN — Medication 22 UNIT(S): at 16:24

## 2024-06-10 RX ADMIN — Medication 60 MILLIGRAM(S): at 05:08

## 2024-06-10 RX ADMIN — PANTOPRAZOLE SODIUM 40 MILLIGRAM(S): 20 TABLET, DELAYED RELEASE ORAL at 17:50

## 2024-06-10 RX ADMIN — NEBIVOLOL HYDROCHLORIDE 5 MILLIGRAM(S): 5 TABLET ORAL at 22:01

## 2024-06-10 RX ADMIN — Medication 3: at 16:24

## 2024-06-10 RX ADMIN — Medication 100 MILLIGRAM(S): at 18:13

## 2024-06-10 RX ADMIN — INSULIN GLARGINE 58 UNIT(S): 100 INJECTION, SOLUTION SUBCUTANEOUS at 21:45

## 2024-06-10 RX ADMIN — SODIUM CHLORIDE 150 MILLILITER(S): 9 INJECTION INTRAMUSCULAR; INTRAVENOUS; SUBCUTANEOUS at 09:43

## 2024-06-10 RX ADMIN — FAMOTIDINE 20 MILLIGRAM(S): 10 INJECTION INTRAVENOUS at 07:46

## 2024-06-10 RX ADMIN — CLOPIDOGREL BISULFATE 75 MILLIGRAM(S): 75 TABLET, FILM COATED ORAL at 07:25

## 2024-06-10 NOTE — PROGRESS NOTE ADULT - SUBJECTIVE AND OBJECTIVE BOX
24H events:    Patient is a 58y old Female who presents with a chief complaint of Chest pain (07 Jun 2024 19:13)    Primary diagnosis of NSTEMI (non-ST elevation myocardial infarction)    Family communication:  Contact date:  Name of person contacted:  Relationship to patient:  Communication details:  What matters most:    PAST MEDICAL & SURGICAL HISTORY  Uterine cancer  1999    DM (diabetes mellitus)    HTN (hypertension)    Obese    Pneumonia  2012 post gastric sleeve    H/O pneumothorax  2012 complications post  gastric sleeve    Ureteral stone    Hydronephrosis, left    S/P hysterectomy  1999, total    History of cholecystectomy    S/P gastric surgery  SLEEVE 2012 -post op complications. ICU X MANY WKS PER PT    S/P gastric surgery  2000 BAND PROCEDURE & REMOVED    H/O hernia repair    SOCIAL HISTORY:  Social History:  Non smoker  No alcohol abuse (07 Jun 2024 08:47)    ALLERGIES:  Benadryl (Hives; Urticaria)  IV Contrast (Anaphylaxis)  latex (Anaphylaxis; Rash; Hives)  nonsteroidal anti-inflammatory agents (Blisters (Mild to Mod))  shellfish (Anaphylaxis)  iodine (Rash; Urticaria; Hives)  penicillins (Anaphylaxis)  sulfa drugs (Anaphylaxis)    MEDICATIONS:  STANDING MEDICATIONS  atorvastatin 80 milliGRAM(s) Oral at bedtime  clopidogrel Tablet 75 milliGRAM(s) Oral daily  dextrose 50% Injectable 25 Gram(s) IV Push once  dextrose 50% Injectable 12.5 Gram(s) IV Push once  enoxaparin Injectable 40 milliGRAM(s) SubCutaneous every 24 hours  EPINEPHrine     1 mG/mL Injectable 0.3 milliGRAM(s) IntraMuscular once  insulin glargine Injectable (LANTUS) 58 Unit(s) SubCutaneous at bedtime  insulin lispro (ADMELOG) corrective regimen sliding scale   SubCutaneous three times a day before meals  insulin lispro Injectable (ADMELOG) 22 Unit(s) SubCutaneous three times a day before meals  losartan 50 milliGRAM(s) Oral every 12 hours  nebivolol 5 milliGRAM(s) Oral at bedtime  pantoprazole    Tablet 40 milliGRAM(s) Oral every 12 hours  regadenoson Injectable 0.4 milliGRAM(s) IV Push once  sodium chloride 0.9%. 1000 milliLiter(s) IV Continuous <Continuous>  ticagrelor 90 milliGRAM(s) Oral every 12 hours    PRN MEDICATIONS  dextrose Oral Gel 15 Gram(s) Oral once PRN    VITALS:   T(F): 97.6  HR: 73  BP: 120/71  RR: 14  SpO2: 96%    PHYSICAL EXAM:  GENERAL:   ( x ) NAD, lying in bed comfortably     (  ) obtunded     (  ) lethargic     (  ) somnolent    HEAD:   (x  ) Atraumatic     (  ) hematoma     (  ) laceration (specify location:       )     NECK:  (x  ) Supple     (  ) neck stiffness     (  ) nuchal rigidity     (  )  no JVD     (  ) JVD present ( -- cm)    HEART:  Rate -->     (x  ) normal rate     (  ) bradycardic     (  ) tachycardic  Rhythm -->     (  ) regular     (  ) regularly irregular     (  ) irregularly irregular  Murmurs -->     (  ) normal s1s2     (  ) systolic murmur     (  ) diastolic murmur     (  ) continuous murmur      (  ) S3 present     (  ) S4 present    LUNGS:   (x  )Unlabored respirations     (  ) tachypnea  (x  ) B/L air entry     (  ) decreased breath sounds in:  (location     )    (  ) no adventitious sound     (  ) crackles     (  ) wheezing      (  ) rhonchi      (specify location:       )  (  ) chest wall tenderness (specify location:       )    ABDOMEN:   ( x ) Soft     (  ) tense   |   (  ) nondistended     (  ) distended   |   (  ) +BS     (  ) hypoactive bowel sounds     (  ) hyperactive bowel sounds  (  ) nontender     (  ) RUQ tenderness     (  ) RLQ tenderness     (  ) LLQ tenderness     (  ) epigastric tenderness     (  ) diffuse tenderness  (  ) Splenomegaly      (  ) Hepatomegaly      (  ) Jaundice     (  ) ecchymosis     EXTREMITIES:  ( x ) Normal     (  ) Rash     (  ) ecchymosis     (  ) varicose veins      (  ) pitting edema     (  ) non-pitting edema   (  ) ulceration     (  ) gangrene:     (location:     )    NERVOUS SYSTEM:    (x  ) A&Ox3     (  ) confused     (  ) lethargic  CN II-XII:     (  ) Intact     (  ) deficits found     (Specify:     )   Upper extremities:     (  ) no sensorimotor deficits     (  ) weakness     (  ) loss of proprioception/vibration     (  ) loss of touch/temperature (specify:    )  Lower extremities:     (  ) no sensorimotor deficits     (  ) weakness     (  ) loss of proprioception/vibration     (  ) loss of touch/temperature (specify:    )    LABS:                        15.5   16.66 )-----------( 299      ( 10 Diogo 2024 06:36 )             46.2     06-10    139  |  99  |  14  ----------------------------<  333<H>  4.6   |  21  |  0.8    Ca    10.1      10 Diogo 2024 06:36  Mg     2.0     06-10    TPro  6.0  /  Alb  3.9  /  TBili  0.3  /  DBili  x   /  AST  26  /  ALT  30  /  AlkPhos  62  06-09    PT/INR - ( 10 Diogo 2024 06:36 )   PT: 11.20 sec;   INR: 0.98 ratio       PTT - ( 10 Diogo 2024 06:36 )  PTT:30.3 sec  Urinalysis Basic - ( 10 Diogo 2024 06:36 )    Color: x / Appearance: x / SG: x / pH: x  Gluc: 333 mg/dL / Ketone: x  / Bili: x / Urobili: x   Blood: x / Protein: x / Nitrite: x   Leuk Esterase: x / RBC: x / WBC x   Sq Epi: x / Non Sq Epi: x / Bacteria: x   24H events:    Patient is a 58y old Female who presents with a chief complaint of Chest pain (07 Jun 2024 19:13)    Primary diagnosis of NSTEMI (non-ST elevation myocardial infarction)    Family communication:    Contact date:  Name of person contacted:  Relationship to patient:  Communication details:  What matters most:    PAST MEDICAL & SURGICAL HISTORY  Uterine cancer  1999    DM (diabetes mellitus)    HTN (hypertension)    Obese    Pneumonia  2012 post gastric sleeve    H/O pneumothorax  2012 complications post  gastric sleeve    Ureteral stone    Hydronephrosis, left    S/P hysterectomy  1999, total    History of cholecystectomy    S/P gastric surgery  SLEEVE 2012 -post op complications. ICU X MANY WKS PER PT    S/P gastric surgery  2000 BAND PROCEDURE & REMOVED    H/O hernia repair    SOCIAL HISTORY:  Social History:  Non smoker  No alcohol abuse (07 Jun 2024 08:47)    ALLERGIES:  Benadryl (Hives; Urticaria)  IV Contrast (Anaphylaxis)  latex (Anaphylaxis; Rash; Hives)  nonsteroidal anti-inflammatory agents (Blisters (Mild to Mod))  shellfish (Anaphylaxis)  iodine (Rash; Urticaria; Hives)  penicillins (Anaphylaxis)  sulfa drugs (Anaphylaxis)    MEDICATIONS:  STANDING MEDICATIONS  atorvastatin 80 milliGRAM(s) Oral at bedtime  clopidogrel Tablet 75 milliGRAM(s) Oral daily  dextrose 50% Injectable 25 Gram(s) IV Push once  dextrose 50% Injectable 12.5 Gram(s) IV Push once  enoxaparin Injectable 40 milliGRAM(s) SubCutaneous every 24 hours  EPINEPHrine     1 mG/mL Injectable 0.3 milliGRAM(s) IntraMuscular once  insulin glargine Injectable (LANTUS) 58 Unit(s) SubCutaneous at bedtime  insulin lispro (ADMELOG) corrective regimen sliding scale   SubCutaneous three times a day before meals  insulin lispro Injectable (ADMELOG) 22 Unit(s) SubCutaneous three times a day before meals  losartan 50 milliGRAM(s) Oral every 12 hours  nebivolol 5 milliGRAM(s) Oral at bedtime  pantoprazole    Tablet 40 milliGRAM(s) Oral every 12 hours  regadenoson Injectable 0.4 milliGRAM(s) IV Push once  sodium chloride 0.9%. 1000 milliLiter(s) IV Continuous <Continuous>  ticagrelor 90 milliGRAM(s) Oral every 12 hours    PRN MEDICATIONS  dextrose Oral Gel 15 Gram(s) Oral once PRN    VITALS:   T(F): 97.6  HR: 73  BP: 120/71  RR: 14  SpO2: 96%    PHYSICAL EXAM:  GENERAL:   ( x ) NAD, lying in bed comfortably     (  ) obtunded     (  ) lethargic     (  ) somnolent    HEAD:   (x  ) Atraumatic     (  ) hematoma     (  ) laceration (specify location:       )     NECK:  (x  ) Supple     (  ) neck stiffness     (  ) nuchal rigidity     (  )  no JVD     (  ) JVD present ( -- cm)    HEART:  Rate -->     (x  ) normal rate     (  ) bradycardic     (  ) tachycardic  Rhythm -->     (  ) regular     (  ) regularly irregular     (  ) irregularly irregular  Murmurs -->     (  ) normal s1s2     (  ) systolic murmur     (  ) diastolic murmur     (  ) continuous murmur      (  ) S3 present     (  ) S4 present    LUNGS:   (x  )Unlabored respirations     (  ) tachypnea  (x  ) B/L air entry     (  ) decreased breath sounds in:  (location     )    (  ) no adventitious sound     (  ) crackles     (  ) wheezing      (  ) rhonchi      (specify location:       )  (  ) chest wall tenderness (specify location:       )    ABDOMEN:   ( x ) Soft     (  ) tense   |   (  ) nondistended     (  ) distended   |   (  ) +BS     (  ) hypoactive bowel sounds     (  ) hyperactive bowel sounds  (  ) nontender     (  ) RUQ tenderness     (  ) RLQ tenderness     (  ) LLQ tenderness     (  ) epigastric tenderness     (  ) diffuse tenderness  (  ) Splenomegaly      (  ) Hepatomegaly      (  ) Jaundice     (  ) ecchymosis     EXTREMITIES:  ( x ) Normal     (  ) Rash     (  ) ecchymosis     (  ) varicose veins      (  ) pitting edema     (  ) non-pitting edema   (  ) ulceration     (  ) gangrene:     (location:     )    NERVOUS SYSTEM:    (x  ) A&Ox3     (  ) confused     (  ) lethargic  CN II-XII:     (  ) Intact     (  ) deficits found     (Specify:     )   Upper extremities:     (  ) no sensorimotor deficits     (  ) weakness     (  ) loss of proprioception/vibration     (  ) loss of touch/temperature (specify:    )  Lower extremities:     (  ) no sensorimotor deficits     (  ) weakness     (  ) loss of proprioception/vibration     (  ) loss of touch/temperature (specify:    )    LABS:                        15.5   16.66 )-----------( 299      ( 10 Diogo 2024 06:36 )             46.2     06-10    139  |  99  |  14  ----------------------------<  333<H>  4.6   |  21  |  0.8    Ca    10.1      10 Diogo 2024 06:36  Mg     2.0     06-10    TPro  6.0  /  Alb  3.9  /  TBili  0.3  /  DBili  x   /  AST  26  /  ALT  30  /  AlkPhos  62  06-09    PT/INR - ( 10 Diogo 2024 06:36 )   PT: 11.20 sec;   INR: 0.98 ratio       PTT - ( 10 Diogo 2024 06:36 )  PTT:30.3 sec  Urinalysis Basic - ( 10 Diogo 2024 06:36 )    Color: x / Appearance: x / SG: x / pH: x  Gluc: 333 mg/dL / Ketone: x  / Bili: x / Urobili: x   Blood: x / Protein: x / Nitrite: x   Leuk Esterase: x / RBC: x / WBC x   Sq Epi: x / Non Sq Epi: x / Bacteria: x

## 2024-06-10 NOTE — PROGRESS NOTE ADULT - ATTENDING COMMENTS
**My note supersedes the resident's note in the event of discrepancies**  58-year-old female with PMHx of HTN, DM, gastric ulcer?, presenting with burning sensation of chest for 4 days.    #Chest pain  #Palpitations  EKG showing sinus tachycardia, no acute ischemic changes  Pt has intermittent chest burning pain  Sp Plavix 300mg in ED  Recent calcium score test normal, likely CCTA NC  dw cardiology  Troponins peaked at 140  - Monitor on tele for any arrhythmias  - Continuing trending troponins  - Echo  - Cont BB  - NST today  - If NST negative, GI eval for possible endoscopy    #Hx of gastric sleeve  #Hx of gastric ulcer  Last EGD in chart in 2022 showing: Grade A esophagitis in the gastroesophageal junction compatible with nonspecific erosive esophagitis. (Biopsy). Erythema and congestion in the antrum and stomach body compatible with non-erosive gastritis. (Biopsy). Hiatal Hernia. Normal mucosa in the anterior bulb, distal bulb, first part of the duodenum and second part of the duodenum. (Biopsy).Surgical anatomy of the stomach noted.  Patient reports she did another upper endoscopy in November 2023 at [facility] showing gastric ulcer  Plan  - c/w home PPI and increase to BID for now  - Consider GI eval if pain does not improve and cardiac workup is negative  - Avoid NSAIDs    #HTN urgency  - c/w losartan 50mg BID    #Headache- resolved  #Chronic neck and back pain  CTH negative   Recent had epidural steroid injection to neck  Plan  - Tylenol PRN for now    #DM on insulin complicated by diabetic neuropathy  - Cont insulin regimen    #DVT prophylaxis: Lovenox 40    #Progress Note Handoff  Pending (specify):  Nuclear stress test  Family discussion: junito pt regarding plan of care  Disposition: Home.
58-year-old female with PMHx of HTN, DM, gastric ulcer?, presenting with burning sensation of chest for 4 days.    #Chest pain  #Palpitations  #Severe Disease of D1  #Severe disease of distal LCX s/p Successful PCI with DARIEN  #Severe disease of OM1 s/p successful PCI with DARIEN  - S/P PCI of OM1 and distal LCx on 6/10.  - Pt with hx of allergy to ASA, c/w Brilinta monotherapy  - Plan for staged PCI of D1, timing to be decided.  Troponins peaked at 140  NST 06/08: Moderate sized defect on anterior wall of LV  Echo 06/08 55-60% EF; grade 1 diastolic dysfunction  - Monitor on tele for any arrhythmias  - Continue trending troponin  - Cont BB  - Start lipitor 80mg qHs    #Hx of gastric sleeve  #Hx of gastric ulcer  Last EGD in chart in 2022 showing: Grade A esophagitis in the gastroesophageal junction compatible with nonspecific erosive esophagitis. (Biopsy). Erythema and congestion in the antrum and stomach body compatible with non-erosive gastritis. (Biopsy). Hiatal Hernia. Normal mucosa in the anterior bulb, distal bulb, first part of the duodenum and second part of the duodenum. (Biopsy).Surgical anatomy of the stomach noted.  Patient reports she did another upper endoscopy in November 2023 at [facility] showing gastric ulcer  Plan  - c/w home PPI , increased to BID on this admission  - Avoid NSAIDs    #HTN urgency  - c/w losartan 50mg BID    #Headache- resolved  #Chronic neck and back pain  CTH negative   Recent had epidural steroid injection to neck  Plan  - Tylenol PRN for now    #DM on insulin complicated by diabetic neuropathy  - Cont insulin regimen    #DVT prophylaxis: Lovenox 40

## 2024-06-10 NOTE — PROGRESS NOTE ADULT - PROVIDER SPECIALTY LIST ADULT
Internal Medicine [Time Spent: ___ minutes] : I have spent [unfilled] minutes of time on the encounter. [>50% of the face to face encounter time was spent on counseling and/or coordination of care for ___] : Greater than 50% of the face to face encounter time was spent on counseling and/or coordination of care for [unfilled] CCU

## 2024-06-10 NOTE — PROGRESS NOTE ADULT - ASSESSMENT
58-year-old female with PMHx of HTN, DM, gastric ulcer?, presenting with burning sensation of chest for 4 days.    #Chest pain  #Palpitations  #Severe Disease of D1  #Severe disease of distal LCX s/p Successful PCI with DARIEN  #Severe disease of OM1 s/p successful PCI with DARIEN  - S/P PCI of OM1 and distal LCx on 6/10.  - Pt with hx of allergy to ASA, c/w Brilinta monotherapy  - Plan for staged PCI of D1, timing to be decided.  EKG showing sinus tachycardia, no acute ischemic changes  Troponins peaked at 140  NST 06/08: Moderate sized defect on anterior wall of LV  Echo 06/08 55-60% EF; grade 1 diastolic dysfunction  - Monitor on tele for any arrhythmias  - Continuing trending troponins  - Cont BB  - Cont plavix 75mg qD  - Start lipitor 80mg qHs    #Hx of gastric sleeve  #Hx of gastric ulcer  Last EGD in chart in 2022 showing: Grade A esophagitis in the gastroesophageal junction compatible with nonspecific erosive esophagitis. (Biopsy). Erythema and congestion in the antrum and stomach body compatible with non-erosive gastritis. (Biopsy). Hiatal Hernia. Normal mucosa in the anterior bulb, distal bulb, first part of the duodenum and second part of the duodenum. (Biopsy).Surgical anatomy of the stomach noted.  Patient reports she did another upper endoscopy in November 2023 at [facility] showing gastric ulcer  Plan  - c/w home PPI , increased to BID on this admission  - Avoid NSAIDs    #HTN urgency  - c/w losartan 50mg BID    #Headache- resolved  #Chronic neck and back pain  CTH negative   Recent had epidural steroid injection to neck  Plan  - Tylenol PRN for now    #DM on insulin complicated by diabetic neuropathy  - Cont insulin regimen    #DVT prophylaxis: Lovenox 40       58-year-old female with PMHx of HTN, DM, gastric ulcer?, presenting with burning sensation of chest for 4 days.    #Chest pain  #Palpitations  #Severe Disease of D1  #Severe disease of distal LCX s/p Successful PCI with DARIEN  #Severe disease of OM1 s/p successful PCI with DARIEN  - S/P PCI of OM1 and distal LCx on 6/10.  - Pt with hx of allergy to ASA, c/w Brilinta monotherapy  - Plan for staged PCI of D1, timing to be decided.  Troponins peaked at 140  NST 06/08: Moderate sized defect on anterior wall of LV  Echo 06/08 55-60% EF; grade 1 diastolic dysfunction  - Monitor on tele for any arrhythmias  - Continue trending troponin  - Cont BB  - Start lipitor 80mg qHs    #Hx of gastric sleeve  #Hx of gastric ulcer  Last EGD in chart in 2022 showing: Grade A esophagitis in the gastroesophageal junction compatible with nonspecific erosive esophagitis. (Biopsy). Erythema and congestion in the antrum and stomach body compatible with non-erosive gastritis. (Biopsy). Hiatal Hernia. Normal mucosa in the anterior bulb, distal bulb, first part of the duodenum and second part of the duodenum. (Biopsy).Surgical anatomy of the stomach noted.  Patient reports she did another upper endoscopy in November 2023 at [facility] showing gastric ulcer  Plan  - c/w home PPI , increased to BID on this admission  - Avoid NSAIDs    #HTN urgency  - c/w losartan 50mg BID    #Headache- resolved  #Chronic neck and back pain  CTH negative   Recent had epidural steroid injection to neck  Plan  - Tylenol PRN for now    #DM on insulin complicated by diabetic neuropathy  - Cont insulin regimen    #DVT prophylaxis: Lovenox 40

## 2024-06-10 NOTE — CHART NOTE - NSCHARTNOTEFT_GEN_A_CORE
PROCEDURE: [facility]    PRIMARY PHYSICIAN: Dr. aCsas    FELLOW: Meme      PROCEDURE DESCRIPTION:   Anesthesia: Local + Sedation     Access: R Radial  Closure: D Stat    Intervention: Successful PCI of OM1 and distal LCx  Implants: Elmer Yuma 2.5 x 15 to distal LCx, Elmer Frotnier 2.75 x 18 to OM1    IV Contrast:  120 mL      ESTIMATED BLOOD LOSS: <10cc    SPECIMENS REMOVED: None    FINDINGS:   DOMINANCE: Left Dominant    LM: Mild disease  LAD: Mild disease diffusely  D1: 100% stenosis of proximal segment  CX: Proximal segment with mild disease, distal segment with 99% stenosis  OM1: 90% stenosis  LPL: Mild disease  RCA: Small, mild disease diffusely      LVEDP: 8  mmHg        POST-OP DIAGNOSIS:  Severe Disease of D1  Severe disease of distal LCX s/p Successful PCI with DARIEN  Severe disease of OM1 s/p successful PCI with DARIEN       PLAN OF CARE:   - Post-procedure LVEDP-guided IV fluids: 150 cc/hr x 6 hrs  - Monitor for signs of anaphylaxis  - Pt with hx of allergy to ASA, c/w Brilinta monotherapy  - Plan for staged PCI of D1, timing to be decided

## 2024-06-10 NOTE — CHART NOTE - NSCHARTNOTEFT_GEN_A_CORE
s/p PCI/DARIEN of OM1 and dCx    -Brilinta called to pt's pharmacy, and it is $59 co-pay per month, pt is agreeable to pay, and it is available for pick-up s/p PCI/DARIEN of OM1 and dCx    -Brilinta called to pt's pharmacy, and it is $5 co-pay per month, pt is agreeable to pay, and it is available for pick-up

## 2024-06-10 NOTE — CHART NOTE - NSCHARTNOTEFT_GEN_A_CORE
PREOPERATIVE DAY OF PROCEDURE EVALUATION:  I have personally seen and examined the patient.  I agree with the history and physical which I have reviewed and noted any changes below.     58-year-old female with severe Contrast Iodine allergy (anaphylaxis), PMHx of HTN, HLD, DM, h/o gastric sleeve, gastric ulcer by EGD in 11/2023 (on PPI BID), who presented to ED with burning sensation of chest for 4 days, EKG showing sinus tachycardia, no acute ischemic changes, NST 06/08 -> Moderate sized defect on anterior wall of LV, Echo 06/08 55-60% EF; grade 1 diastolic dysfunction  Pt is now referred for LHC with possible intervention if clinically indicated.         Bleeding Risk Score:   1.4%  IVF: 250cc NS bolus followed by 75/hr  ASA 324mg po x 1  received Plavix 300mg po x 1 on 6/7, 75mg po x 1 today  -on BB, statin, ARB    VS:   Right Steven:       (Signed electronically by __________)  06-10-24 @ 07:01 PREOPERATIVE DAY OF PROCEDURE EVALUATION:  I have personally seen and examined the patient.  I agree with the history and physical which I have reviewed and noted any changes below.     58-year-old F with severe Contrast Iodine allergy (anaphylaxis), PMHx of HTN, HLD, DM, h/o gastric sleeve, gastric ulcer by EGD in 11/2023 (on PPI BID), who presented to ED with burning sensation of chest for 4 days, EKG showing sinus tachycardia, no acute ischemic changes, NST 06/08 -> Moderate sized defect on anterior wall, Echo 06/08: 55-60% EF; grade 1 diastolic dysfunction.  Pt is now referred for LHC with possible intervention if clinically indicated.       Bleeding Risk Score:   1.4%  IVF: 250cc NS bolus followed by 75/hr  -received Plavix 300mg po x 1 on 6/7 in ED, 75mg po x 1 today  -allergic to NSAIDs  -on BB, statin, ARB    VS:   Right Steven:       (Signed electronically by __________)  06-10-24 @ 07:01 PREOPERATIVE DAY OF PROCEDURE EVALUATION:  I have personally seen and examined the patient.  I agree with the history and physical which I have reviewed and noted any changes below.     58-year-old F with severe Contrast Iodine allergy (anaphylaxis), ASA allergy, PMHx of HTN, HLD, DM, h/o gastric sleeve 10 yrs ago, gastric ulcer by EGD in 11/2023 (was on PPI BID, now daily), s/p epidural injection in the lower back on 6/6/24, who presented to ED with burning sensation of chest for 4 days @ rest, EKG showing sinus tachycardia, no acute ischemic changes, NST 06/08 -> Moderate sized defect on anterior wall, Echo 06/08: 55-60% EF; grade 1 diastolic dysfunction.  Pt is now referred for LHC with possible intervention if clinically indicated.       Bleeding Risk Score:   1.4%  IVF: 250cc NS bolus followed by 75/hr  -received Plavix 300mg po x 1 on 6/7 in ED, 75mg po x 1 today  -allergic to ASA  -on BB, ARB  -started statin on this admission  -Premedicated with Prednisone 60mg po x 3 prior to cath, Solucortef 200mg IVx1 now and Pepcid 20mg IV x 1 now  -pt claims allergy to Benadryl    VS: 173/87, 95, 16, 98% on RA  Right Steven: positive       (Signed electronically by __________)  06-10-24 @ 07:01 PREOPERATIVE DAY OF PROCEDURE EVALUATION:  I have personally seen and examined the patient.  I agree with the history and physical which I have reviewed and noted any changes below.     58-year-old F with severe Contrast Iodine allergy (anaphylaxis), ASA allergy, PMHx of HTN, HLD, DM, h/o gastric sleeve 10 yrs ago, gastric ulcer by EGD in 11/2023 (was on PPI BID, now daily), s/p epidural injection in the lower back on 6/6/24, who presented to ED with burning sensation of chest for 4 days @ rest, EKG showing sinus tachycardia, no acute ischemic changes, NST 06/08 -> Moderate sized defect on anterior wall, Echo 06/08: EF 55-60%, G1DD.  Pt is now referred for LHC with possible intervention if clinically indicated.       Bleeding Risk Score:   1.4%  IVF: 250cc NS bolus followed by 75/hr  -received Plavix 300mg po x 1 on 6/7 in ED, 75mg po x 1 today  -allergic to ASA  -on BB, ARB  -started statin on this admission  -Premedicated with Prednisone 60mg po x 3 prior to cath, Solucortef 200mg IVx1 now and Pepcid 20mg IV x 1 now  -pt claims allergy to Benadryl    VS: 173/87, 95, 16, 98% on RA  Right Steven: positive       (Signed electronically by __________)  06-10-24 @ 07:01

## 2024-06-11 ENCOUNTER — TRANSCRIPTION ENCOUNTER (OUTPATIENT)
Age: 59
End: 2024-06-11

## 2024-06-11 VITALS
RESPIRATION RATE: 18 BRPM | HEART RATE: 67 BPM | DIASTOLIC BLOOD PRESSURE: 70 MMHG | TEMPERATURE: 98 F | SYSTOLIC BLOOD PRESSURE: 151 MMHG

## 2024-06-11 LAB
ALBUMIN SERPL ELPH-MCNC: 3.9 G/DL — SIGNIFICANT CHANGE UP (ref 3.5–5.2)
ALP SERPL-CCNC: 71 U/L — SIGNIFICANT CHANGE UP (ref 30–115)
ALT FLD-CCNC: 48 U/L — HIGH (ref 0–41)
ANION GAP SERPL CALC-SCNC: 13 MMOL/L — SIGNIFICANT CHANGE UP (ref 7–14)
AST SERPL-CCNC: 30 U/L — SIGNIFICANT CHANGE UP (ref 0–41)
BILIRUB SERPL-MCNC: 0.4 MG/DL — SIGNIFICANT CHANGE UP (ref 0.2–1.2)
BUN SERPL-MCNC: 21 MG/DL — HIGH (ref 10–20)
CALCIUM SERPL-MCNC: 9.1 MG/DL — SIGNIFICANT CHANGE UP (ref 8.4–10.5)
CHLORIDE SERPL-SCNC: 104 MMOL/L — SIGNIFICANT CHANGE UP (ref 98–110)
CO2 SERPL-SCNC: 22 MMOL/L — SIGNIFICANT CHANGE UP (ref 17–32)
CREAT SERPL-MCNC: 0.7 MG/DL — SIGNIFICANT CHANGE UP (ref 0.7–1.5)
EGFR: 99 ML/MIN/1.73M2 — SIGNIFICANT CHANGE UP
GLUCOSE BLDC GLUCOMTR-MCNC: 243 MG/DL — HIGH (ref 70–99)
GLUCOSE BLDC GLUCOMTR-MCNC: 285 MG/DL — HIGH (ref 70–99)
GLUCOSE SERPL-MCNC: 336 MG/DL — HIGH (ref 70–99)
HCT VFR BLD CALC: 37.2 % — SIGNIFICANT CHANGE UP (ref 37–47)
HGB BLD-MCNC: 12.5 G/DL — SIGNIFICANT CHANGE UP (ref 12–16)
MCHC RBC-ENTMCNC: 27.7 PG — SIGNIFICANT CHANGE UP (ref 27–31)
MCHC RBC-ENTMCNC: 33.6 G/DL — SIGNIFICANT CHANGE UP (ref 32–37)
MCV RBC AUTO: 82.5 FL — SIGNIFICANT CHANGE UP (ref 81–99)
NRBC # BLD: 0 /100 WBCS — SIGNIFICANT CHANGE UP (ref 0–0)
PLATELET # BLD AUTO: 241 K/UL — SIGNIFICANT CHANGE UP (ref 130–400)
PMV BLD: 9.9 FL — SIGNIFICANT CHANGE UP (ref 7.4–10.4)
POTASSIUM SERPL-MCNC: 4.5 MMOL/L — SIGNIFICANT CHANGE UP (ref 3.5–5)
POTASSIUM SERPL-SCNC: 4.5 MMOL/L — SIGNIFICANT CHANGE UP (ref 3.5–5)
PROT SERPL-MCNC: 5.7 G/DL — LOW (ref 6–8)
RBC # BLD: 4.51 M/UL — SIGNIFICANT CHANGE UP (ref 4.2–5.4)
RBC # FLD: 13.5 % — SIGNIFICANT CHANGE UP (ref 11.5–14.5)
SODIUM SERPL-SCNC: 139 MMOL/L — SIGNIFICANT CHANGE UP (ref 135–146)
WBC # BLD: 13.89 K/UL — HIGH (ref 4.8–10.8)
WBC # FLD AUTO: 13.89 K/UL — HIGH (ref 4.8–10.8)

## 2024-06-11 PROCEDURE — 93010 ELECTROCARDIOGRAM REPORT: CPT

## 2024-06-11 PROCEDURE — 99239 HOSP IP/OBS DSCHRG MGMT >30: CPT

## 2024-06-11 RX ORDER — ROSUVASTATIN CALCIUM 5 MG/1
1 TABLET ORAL
Qty: 30 | Refills: 1
Start: 2024-06-11 | End: 2024-08-09

## 2024-06-11 RX ORDER — ALPRAZOLAM 0.25 MG
0.5 TABLET ORAL ONCE
Refills: 0 | Status: DISCONTINUED | OUTPATIENT
Start: 2024-06-11 | End: 2024-06-11

## 2024-06-11 RX ADMIN — Medication 2: at 07:58

## 2024-06-11 RX ADMIN — LOSARTAN POTASSIUM 50 MILLIGRAM(S): 100 TABLET, FILM COATED ORAL at 05:49

## 2024-06-11 RX ADMIN — Medication 22 UNIT(S): at 07:59

## 2024-06-11 RX ADMIN — Medication 0.5 MILLIGRAM(S): at 00:41

## 2024-06-11 RX ADMIN — TICAGRELOR 90 MILLIGRAM(S): 90 TABLET ORAL at 05:49

## 2024-06-11 RX ADMIN — PANTOPRAZOLE SODIUM 40 MILLIGRAM(S): 20 TABLET, DELAYED RELEASE ORAL at 05:49

## 2024-06-11 NOTE — PROGRESS NOTE ADULT - SUBJECTIVE AND OBJECTIVE BOX
Cardiology Follow up s/p PCI DARIEN     MAGGI BHAGAT   58y Female  PAST MEDICAL & SURGICAL HISTORY:  Uterine cancer 1999  DM (diabetes mellitus)  HTN (hypertension)  Obese  Pneumonia  2012 post gastric sleeve  H/O pneumothorax  2012 complications post  gastric sleeve  Ureteral stone  Hydronephrosis, left  S/P hysterectomy  1999,total  History of cholecystectomy  S/P gastric surgery  SLEEVE 2012 -post op complications. ICU X MANY WKS PER PT  S/P gastric surgery  2000 BAND PROCEDURE & REMOVED  H/O hernia repair         HPI:  Case of 58-year-old female with PMHx of HTN, DM, gastric ulcer?, presenting with burning sensation of chest for 4 days.    Such sensation has been ongoing intermittently for 4 days, no alleviating or aggravating factors.  She also notes that she has been having intermittent palpitations for the last 4 months.  Today she also mentions pressure like headache that is bilateral.    She has been following with Dr Casas for the palpitations, patient mentioned doing a calcium score test? recently and it was reportedly within normal limits.    Of note, she has cervical disc disease and she got an injection in her neck with pain management yesterday.    Denies fevers, chills, shortness of breath, nausea, vomiting, abdominal pain, weakness.    In the ED, vitals were   · /101 mm Hg  · Heart Rate 101 /min  · Respiration Rate (breaths/min) 18 /min  · Temp (F) 98.2 Degrees F  · SpO2 (%) 98 %    Labs were remarkable for WBC count of 15k, Trop 32 =>54, AG 18  EKG showing sinus tachy    CTH No evidence of acute intracranial pathology.  CXR: Low lung volume. Stable opacity adjacent to the aorta. Refer to the report of the CT scan of the chest dated July 19, 2023.    In the ED, patient was given Plavix 300mg and Famotidine.   (07 Jun 2024 08:47)    Allergies    Benadryl (Hives; Urticaria)  IV Contrast (Anaphylaxis)  latex (Anaphylaxis; Rash; Hives)  nonsteroidal anti-inflammatory agents (Blisters (Mild to Mod))  shellfish (Anaphylaxis)  iodine (Rash; Urticaria; Hives)  penicillins (Anaphylaxis)  sulfa drugs (Anaphylaxis)    Intolerances      Patient seen and examined at bedside. No acute events overnight.  Patient without complaints. Pt ambulated without issues/symptoms  Denies CP, SOB, palpitations, or dizziness  No events on telemetry overnight    Vital Signs Last 24 Hrs  T(C): 36.5 (10 Diogo 2024 19:59), Max: 36.5 (10 Diogo 2024 19:59)  T(F): 97.7 (10 Diogo 2024 19:59), Max: 97.7 (10 Diogo 2024 19:59)  HR: 62 (11 Jun 2024 04:56) (62 - 98)  BP: 166/78 (11 Jun 2024 04:56) (119/65 - 166/78)  BP(mean): 110 (11 Jun 2024 04:56) (110 - 110)  RR: 18 (11 Jun 2024 04:56) (12 - 18)  SpO2: 96% (11 Jun 2024 04:56) (95% - 99%)    Parameters below as of 11 Jun 2024 04:56  Patient On (Oxygen Delivery Method): room air        MEDICATIONS  (STANDING):  atorvastatin 80 milliGRAM(s) Oral at bedtime  dextrose 50% Injectable 25 Gram(s) IV Push once  dextrose 50% Injectable 12.5 Gram(s) IV Push once  enoxaparin Injectable 40 milliGRAM(s) SubCutaneous every 24 hours  EPINEPHrine     1 mG/mL Injectable 0.3 milliGRAM(s) IntraMuscular once  insulin glargine Injectable (LANTUS) 58 Unit(s) SubCutaneous at bedtime  insulin lispro (ADMELOG) corrective regimen sliding scale   SubCutaneous three times a day before meals  insulin lispro Injectable (ADMELOG) 22 Unit(s) SubCutaneous three times a day before meals  losartan 50 milliGRAM(s) Oral every 12 hours  nebivolol 5 milliGRAM(s) Oral at bedtime  pantoprazole    Tablet 40 milliGRAM(s) Oral every 12 hours  regadenoson Injectable 0.4 milliGRAM(s) IV Push once  sodium chloride 0.9%. 1000 milliLiter(s) (150 mL/Hr) IV Continuous <Continuous>  ticagrelor 90 milliGRAM(s) Oral every 12 hours    MEDICATIONS  (PRN):  dextrose Oral Gel 15 Gram(s) Oral once PRN Blood Glucose LESS THAN 70 milliGRAM(s)/deciliter      REVIEW OF SYSTEMS:          All negative except as mentioned in HPI    PHYSICAL EXAM:           CONSTITUTIONAL: Well-developed; well-nourished; in no acute distress  	SKIN: warm, dry  	HEAD: Normocephalic; atraumatic  	EYES: PERRL.  	ENT: No nasal discharge, airway clear, mucous membranes moist  	NECK: Supple; non tender.  	CARD: +S1, +S2, no murmurs, gallops, or rubs. Regular rate and rhythm    	RESP: No wheezes, rales or rhonchi. CTA B/L  	ABD: soft ntnd, + BS x 4 quadrants  	EXT: moves all extremities,  no clubbing, cyanosis or edema  	NEURO: Alert and oriented x3, no focal deficits          PSYCH: Cooperative, appropriate          VASCULAR:  + Rad / + PTs / +  DPs          EXTREMITY:           	   Right Radial: Dressing D/C/I, pressure dressing removed, access site soft, no hematoma, no pain, + pulses, no sign of infection, no numbness            ECG: SR                                                                                                                2D ECHO:< from: TTE Echo Complete w/o Contrast w/ Doppler (06.08.24 @ 12:43) >    Summary:   1. Left ventricular ejection fraction, by visual estimation, is 55 to   60%.   2. Technically difficult study.   3. Normal global left ventricular systolic function.   4. Spectral Doppler shows impaired relaxation pattern of left   ventricular myocardial filling (Grade I diastolic dysfunction).   5. Normal left atrial size.   6. Normal right atrial size.   7. Trace mitral valve regurgitation.   8. There is no evidence of pericardial effusion.      LABS:                        12.5   13.89 )-----------( 241      ( 11 Jun 2024 04:43 )             37.2     06-11    139  |  104  |  21<H>  ----------------------------<  336<H>  4.5   |  22  |  0.7    Ca    9.1      11 Jun 2024 04:43  Mg     2.0     06-10    TPro  5.7<L>  /  Alb  3.9  /  TBili  0.4  /  DBili  x   /  AST  30  /  ALT  48<H>  /  AlkPhos  71  06-11        LIVER FUNCTIONS - ( 11 Jun 2024 04:43 )  Alb: 3.9 g/dL / Pro: 5.7 g/dL / ALK PHOS: 71 U/L / ALT: 48 U/L / AST: 30 U/L / GGT: x             A/P:  I discussed the case with Cardiologist Dr. Casas and recommend the following:    S/P PCI:     FINDINGS:   DOMINANCE: Left Dominant    LM: Mild disease  LAD: Mild disease diffusely  D1: 100% stenosis of proximal segment  CX: Proximal segment with mild disease, distal segment with 99% stenosis  OM1: 90% stenosis  LPL: Mild disease  RCA: Small, mild disease diffusely      LVEDP: 8  mmHg        Intervention: Successful PCI of OM1 and distal LCx  Implants: [place] 2.5 x 15 to distal LCx, [place] 2.75 x 18 to OM1                      Care per CCU SDU team                   monitor for signs/symptoms of bleeding   	     Continue DAPT ( Brilinta 90mg twice daily), ARB, B-Blocker, Statin Therapy                   Patient has severe allergy to ASA                                Patient pharmacy called in for Brilinta prescription and it is  $5.00  per month                 Patient agreeing to take DAPT for at least one year or as directed by cardiologist                    Pt given instructions on importance of taking antiplatelet medication or risk acute stent thrombosis/death                   Post cath instructions, access site care and activity restrictions reviewed with patient                     Discussed with patient to return to hospital if experience chest pain, shortness breath, dizziness and site bleeding                   Aggressive risk factor modification, diet counseling, smoking cessation discussed with patient                       Can discharge patient from cardiac standpoint at after ambulating without symptoms and access site wnl, ECG and blood work reviewed   Cardiac rehab information provided/ referral and communication for cardiac rehab completed                   Benefits of Cardiac Rehab discussed with patient,  Patient instructed to call and make first                               appointment after first f/u visit with Cardiologist                    Follow up with Cardiology Dr. Casas  in  two weeks.  Instructed to call and make an appointment                      Discharge instructions as follows, when ready to d/c:                   - Continue medical regimen as prescribed to prevent chest pain                   - Continue dual anti-platelet therapy, beta blocker, statin, arb, ppi                    - If you are diabetic and taking medication containing Metformin, do not take them for 48 hours after the procedure                   - Instructed to call 911 if chest pain, shortness of breath or bleeding from access site                   - No heavy lifting >10lbs x 1 week                   - No driving x 24 hours                   - No baths, swimming pools x 1 week, may shower                   - Low sodium low fat low cholesterol diet                   - Follow-up with Cardiologist in 1 weeks after discharge

## 2024-06-11 NOTE — DISCHARGE NOTE PROVIDER - PROVIDER TOKENS
PROVIDER:[TOKEN:[[id number]],FOLLOWUP:[1 week]],PROVIDER:[TOKEN:[[id number]],FOLLOWUP:[1 week]]

## 2024-06-11 NOTE — DISCHARGE NOTE PROVIDER - NSDCMRMEDTOKEN_GEN_ALL_CORE_FT
Brilinta (ticagrelor) 90 mg oral tablet: 1 tab(s) orally 2 times a day  Bystolic 5 mg oral tablet: 1 tab(s) orally once a day  Levemir FlexPen 100 units/mL subcutaneous solution: 50 unit(s) subcutaneous once a day (at bedtime)  losartan 50 mg oral tablet: 1 tab(s) orally 2 times a day  metFORMIN 1000 mg oral tablet: 1 tab(s) orally 2 times a day  NovoLOG FlexPen 100 units/mL injectable solution: 22 unit(s) injectable 3 times a day (before meals)  omeprazole 40 mg oral delayed release capsule: 1 cap(s) orally once a day   Brilinta (ticagrelor) 90 mg oral tablet: 1 tab(s) orally 2 times a day  Bystolic 5 mg oral tablet: 1 tab(s) orally once a day  Levemir FlexPen 100 units/mL subcutaneous solution: 50 unit(s) subcutaneous once a day (at bedtime)  losartan 50 mg oral tablet: 1 tab(s) orally 2 times a day  metFORMIN 1000 mg oral tablet: 1 tab(s) orally 2 times a day  NovoLOG FlexPen 100 units/mL injectable solution: 22 unit(s) injectable 3 times a day (before meals)  omeprazole 40 mg oral delayed release capsule: 1 cap(s) orally once a day  rosuvastatin 10 mg oral tablet: 1 tab(s) orally once a day (at bedtime)

## 2024-06-11 NOTE — DISCHARGE NOTE PROVIDER - NSDCFUADDINST_GEN_ALL_CORE_FT
Please follow up with your cardiologist Dr. Casas within a week  Please take your Brillinta as directed  Please take your Statin

## 2024-06-11 NOTE — DISCHARGE NOTE PROVIDER - HOSPITAL COURSE
Case of 58-year-old female with PMHx of HTN, DM, gastric ulcer?, presenting with burning sensation of chest for 4 days.  Such sensation has been ongoing intermittently for 4 days, no alleviating or aggravating factors.  She also notes that she has been having intermittent palpitations for the last 4 months.  Today she also mentions pressure like headache that is bilateral.  She has been following with Dr Casas for the palpitations, patient mentioned doing a calcium score test recently and it was reportedly within normal limits.  Of note, she has cervical disc disease and she got an injection in her neck with pain management yesterday.  Denies fevers, chills, shortness of breath, nausea, vomiting, abdominal pain, weakness.  In the ED, vitals were   · /101 mm Hg  · Heart Rate 101 /min  · Respiration Rate (breaths/min) 18 /min  · Temp (F) 98.2 Degrees F  · SpO2 (%) 98 %  Labs were remarkable for WBC count of 15k, Trop 32 =>54, AG 18  EKG showing sinus tachy  CTH No evidence of acute intracranial pathology.  CXR: Low lung volume. Stable opacity adjacent to the aorta. Refer to the report of the CT scan of the chest dated July 19, 2023.  In the ED, patient was given Plavix 300mg and Famotidine.    The patient was admitted to medicine for chest pain, Serial Ekgs were negative but troponins continued to increase. The pt is known to have allergies to contrast and hence a CCTA could not be performed. A nuclear stress test showed a reversible defect of the anterior wall,. The pt was subsequently taken for a LHC and 2 stents were placed in the LCx and OM1. The pt is currently stable for Discharge.    #Chest pain  #Palpitations  #Severe Disease of D1  #Severe disease of distal LCX s/p Successful PCI with DARIEN  #Severe disease of OM1 s/p successful PCI with DARIEN  - S/P PCI of OM1 and distal LCx on 6/10.  - Pt with hx of allergy to ASA, c/w Brilinta monotherapy  - Plan for staged PCI of D1, timing to be decided by Dr. Casas  Troponins peaked at 140  NST 06/08: Moderate sized defect on anterior wall of LV  Echo 06/08 55-60% EF; grade 1 diastolic dysfunction  - Cont BB  - Start rosuvastatin 10 Mg at bedtime    #Hx of gastric sleeve  #Hx of gastric ulcer  Last EGD in chart in 2022 showing: Grade A esophagitis in the gastroesophageal junction compatible with nonspecific erosive esophagitis. (Biopsy). Erythema and congestion in the antrum and stomach body compatible with non-erosive gastritis. (Biopsy). Hiatal Hernia. Normal mucosa in the anterior bulb, distal bulb, first part of the duodenum and second part of the duodenum. (Biopsy).Surgical anatomy of the stomach noted.  Patient reports she did another upper endoscopy in November 2023 at [facility] showing gastric ulcer  Plan  - c/w home PPI   - Avoid NSAIDs    #HTN urgency  - c/w losartan 50mg BID    #Headache- resolved  #Chronic neck and back pain  CTH negative   Recent had epidural steroid injection to neck  Plan  - Tylenol PRN    #DM on insulin complicated by diabetic neuropathy  - Cont insulin regimen  - F/U out/pt for regimen modification   Case of 58-year-old female with PMHx of HTN, DM, gastric ulcer?, presenting with burning sensation of chest for 4 days.  Such sensation has been ongoing intermittently for 4 days, no alleviating or aggravating factors.  She also notes that she has been having intermittent palpitations for the last 4 months.  Today she also mentions pressure like headache that is bilateral.  She has been following with Dr Casas for the palpitations, patient mentioned doing a calcium score test recently and it was reportedly within normal limits.  Of note, she has cervical disc disease and she got an injection in her neck with pain management yesterday.  Denies fevers, chills, shortness of breath, nausea, vomiting, abdominal pain, weakness.  In the ED, vitals were   · /101 mm Hg  · Heart Rate 101 /min  · Respiration Rate (breaths/min) 18 /min  · Temp (F) 98.2 Degrees F  · SpO2 (%) 98 %  Labs were remarkable for WBC count of 15k, Trop 32 =>54, AG 18  EKG showing sinus tachy  CTH No evidence of acute intracranial pathology.  CXR: Low lung volume. Stable opacity adjacent to the aorta. Refer to the report of the CT scan of the chest dated July 19, 2023.  In the ED, patient was given Plavix 300mg and Famotidine.    The patient was admitted to medicine for chest pain, Serial Ekgs were negative but troponins continued to increase. The pt is known to have allergies to contrast and hence a CCTA could not be performed. A nuclear stress test showed a reversible defect of the anterior wall,. The pt was subsequently taken for a LHC and 2 stents were placed in the LCx and OM1. The pt is currently stable for Discharge.    #Chest pain  #Palpitations  #Severe Disease of D1  #Severe disease of distal LCX s/p Successful PCI with DARIEN  #Severe disease of OM1 s/p successful PCI with DARIEN  - S/P PCI of OM1 and distal LCx on 6/10.  - Pt with hx of allergy to ASA, c/w Brilinta monotherapy  - Plan for staged PCI of D1, timing to be decided by Dr. Cassa, vs. medical therapy  Troponins peaked at 140  NST 06/08: Moderate sized defect on anterior wall of LV  Echo 06/08 55-60% EF; grade 1 diastolic dysfunction  - Cont BB  - Start rosuvastatin 10 Mg at bedtime    #Hx of gastric sleeve  #Hx of gastric ulcer  Last EGD in chart in 2022 showing: Grade A esophagitis in the gastroesophageal junction compatible with nonspecific erosive esophagitis. (Biopsy). Erythema and congestion in the antrum and stomach body compatible with non-erosive gastritis. (Biopsy). Hiatal Hernia. Normal mucosa in the anterior bulb, distal bulb, first part of the duodenum and second part of the duodenum. (Biopsy).Surgical anatomy of the stomach noted.  Patient reports she did another upper endoscopy in November 2023 at [facility] showing gastric ulcer  Plan  - c/w home PPI   - Avoid NSAIDs    #HTN urgency  - c/w losartan 50mg BID    #Headache- resolved  #Chronic neck and back pain  CTH negative   Recent had epidural steroid injection to neck  Plan  - Tylenol PRN    #DM on insulin complicated by diabetic neuropathy  - Cont insulin regimen, start monjaro. We discussed Farxiga, but she would like to address any change of therapy with her endocrinologist  - F/U out/pt for regimen modification   Case of 58-year-old female with PMHx of HTN, DM, gastric ulcer?, presenting with burning sensation of chest for 4 days.  Such sensation has been ongoing intermittently for 4 days, no alleviating or aggravating factors.  She also notes that she has been having intermittent palpitations for the last 4 months.  Today she also mentions pressure like headache that is bilateral.  She has been following with Dr Casas for the palpitations, patient mentioned doing a calcium score test recently and it was reportedly within normal limits.  Of note, she has cervical disc disease and she got an injection in her neck with pain management yesterday.  Denies fevers, chills, shortness of breath, nausea, vomiting, abdominal pain, weakness.  In the ED, vitals were   · /101 mm Hg  · Heart Rate 101 /min  · Respiration Rate (breaths/min) 18 /min  · Temp (F) 98.2 Degrees F  · SpO2 (%) 98 %  Labs were remarkable for WBC count of 15k, Trop 32 =>54, AG 18  EKG showing sinus tachy  CTH No evidence of acute intracranial pathology.  CXR: Low lung volume. Stable opacity adjacent to the aorta. Refer to the report of the CT scan of the chest dated July 19, 2023.  In the ED, patient was given Plavix 300mg and Famotidine.    The patient was admitted to medicine for chest pain, Serial Ekgs were negative but troponins continued to increase. The pt is known to have allergies to contrast and hence a CCTA could not be performed. A nuclear stress test showed a reversible defect of the anterior wall,. The pt was subsequently taken for a LHC and 2 stents were placed in the LCx and OM1. The pt is currently stable for Discharge.    #Chest pain  #Palpitations  #Severe Disease of D1  #Severe disease of distal LCX s/p Successful PCI with DARIEN  #Severe disease of OM1 s/p successful PCI with DARIEN  - S/P PCI of OM1 and distal LCx on 6/10.  - Pt with hx of allergy to ASA, c/w Brilinta monotherapy  - Plan for staged PCI of D1, timing to be decided by Dr. Casas, vs. medical therapy  Troponins peaked at 140  NST 06/08: Moderate sized defect on anterior wall of LV  Echo 06/08 55-60% EF; grade 1 diastolic dysfunction  - Cont BB  - Start rosuvastatin 10 Mg at bedtime    #Hx of gastric sleeve  #Hx of gastric ulcer  Last EGD in chart in 2022 showing: Grade A esophagitis in the gastroesophageal junction compatible with nonspecific erosive esophagitis. (Biopsy). Erythema and congestion in the antrum and stomach body compatible with non-erosive gastritis. (Biopsy). Hiatal Hernia. Normal mucosa in the anterior bulb, distal bulb, first part of the duodenum and second part of the duodenum. (Biopsy).Surgical anatomy of the stomach noted.  Patient reports she did another upper endoscopy in November 2023 at [facility] showing gastric ulcer  Plan  - c/w home PPI   - Avoid NSAIDs    #HTN urgency  - c/w losartan 50mg BID    #Headache- resolved  #Chronic neck and back pain  CTH negative   Recent had epidural steroid injection to neck  Plan  - Tylenol PRN    #DM on insulin complicated by diabetic neuropathy  - A1c 9.5%  - Cont insulin regimen, start monjaro. We discussed Farxiga, but she would like to address any change of therapy with her endocrinologist  - F/U out/pt for regimen modification

## 2024-06-11 NOTE — DISCHARGE NOTE PROVIDER - NSDCCPCAREPLAN_GEN_ALL_CORE_FT
PRINCIPAL DISCHARGE DIAGNOSIS  Diagnosis: NSTEMI (non-ST elevation myocardial infarction)  Assessment and Plan of Treatment: A non-ST-elevation myocardial infarction is a type of heart attack that happens when a part of your heart is not getting enough oxygen.  The factors you can manage most are your lifestyle choices. These include:  Tobacco use and smoking.  Diet, including your intake of sodium (blood pressure), sugar (diabetes) or fat (cholesterol).  Your level of physical activity.  Percutaneous coronary intervention is a procedure where an interventional cardiologist inserts a catheter device into a major blood vessel somewhere in your body (usually in your wrist or near your upper thigh). They then thread that device up to your heart and your artery in question. Once there, they inflate a balloon at the device’s tip, helping clear the blockage. Stent placement, which uses a scaffold-like device to help hold the blood vessel open, is also possible and common during PCI.  PCI works best when done sooner rather than later.  Medication  Several medications can help people who are having a heart attack. These include:  Aspirin or other antiplatelet medications. These drugs stop platelets from bunching together and forming clots in your blood.   Anticoagulants. Like antiplatelet medications, these also interfere with clotting, but do so by interfering with the clotting process itself rather than the platelets.  Angiotensin-converting enzyme (ACE) inhibitors. These medications interfere with your body’s natural conversion of a protein that raises your blood pressure. Blocking that process lowers your blood pressure.   Beta-blockers. These medications slow down your heart rate and cause your heart to pump with less force. Both effects are important because they reduce how much oxygen your heart needs by easing how hard it works.   Statins. These medications lower how much cholesterol is in your blood and reduce the risk that plaque in your coronary arteries will worsen.

## 2024-06-11 NOTE — DISCHARGE NOTE PROVIDER - CARE PROVIDERS DIRECT ADDRESSES
[email or web address]

## 2024-06-11 NOTE — DISCHARGE NOTE PROVIDER - CARE PROVIDER_API CALL
Chico Casas  Interventional Cardiology  [address] [place] [address]  Phone: [phone]  Fax: [phone]  Follow Up Time: 1 week    Angela Mckeon  Internal Medicine  [address] [facility], [facility] [address]  [place] [address]  Phone: [phone]  Fax: [phone]  Follow Up Time: 1 week

## 2024-06-17 DIAGNOSIS — I21.4 NON-ST ELEVATION (NSTEMI) MYOCARDIAL INFARCTION: ICD-10-CM

## 2024-06-17 DIAGNOSIS — Z82.49 FAMILY HISTORY OF ISCHEMIC HEART DISEASE AND OTHER DISEASES OF THE CIRCULATORY SYSTEM: ICD-10-CM

## 2024-06-17 DIAGNOSIS — R51.9 HEADACHE, UNSPECIFIED: ICD-10-CM

## 2024-06-17 DIAGNOSIS — Z79.84 LONG TERM (CURRENT) USE OF ORAL HYPOGLYCEMIC DRUGS: ICD-10-CM

## 2024-06-17 DIAGNOSIS — Z88.2 ALLERGY STATUS TO SULFONAMIDES: ICD-10-CM

## 2024-06-17 DIAGNOSIS — Z91.040 LATEX ALLERGY STATUS: ICD-10-CM

## 2024-06-17 DIAGNOSIS — Z85.42 PERSONAL HISTORY OF MALIGNANT NEOPLASM OF OTHER PARTS OF UTERUS: ICD-10-CM

## 2024-06-17 DIAGNOSIS — Z90.49 ACQUIRED ABSENCE OF OTHER SPECIFIED PARTS OF DIGESTIVE TRACT: ICD-10-CM

## 2024-06-17 DIAGNOSIS — Z88.0 ALLERGY STATUS TO PENICILLIN: ICD-10-CM

## 2024-06-17 DIAGNOSIS — Z88.6 ALLERGY STATUS TO ANALGESIC AGENT: ICD-10-CM

## 2024-06-17 DIAGNOSIS — Z88.8 ALLERGY STATUS TO OTHER DRUGS, MEDICAMENTS AND BIOLOGICAL SUBSTANCES: ICD-10-CM

## 2024-06-17 DIAGNOSIS — E11.40 TYPE 2 DIABETES MELLITUS WITH DIABETIC NEUROPATHY, UNSPECIFIED: ICD-10-CM

## 2024-06-17 DIAGNOSIS — Z79.02 LONG TERM (CURRENT) USE OF ANTITHROMBOTICS/ANTIPLATELETS: ICD-10-CM

## 2024-06-17 DIAGNOSIS — R00.0 TACHYCARDIA, UNSPECIFIED: ICD-10-CM

## 2024-06-17 DIAGNOSIS — Z91.013 ALLERGY TO SEAFOOD: ICD-10-CM

## 2024-06-17 DIAGNOSIS — Z91.041 RADIOGRAPHIC DYE ALLERGY STATUS: ICD-10-CM

## 2024-06-17 DIAGNOSIS — Z79.4 LONG TERM (CURRENT) USE OF INSULIN: ICD-10-CM

## 2024-06-17 DIAGNOSIS — E66.9 OBESITY, UNSPECIFIED: ICD-10-CM

## 2024-06-17 DIAGNOSIS — Z83.3 FAMILY HISTORY OF DIABETES MELLITUS: ICD-10-CM

## 2024-06-17 DIAGNOSIS — Z87.11 PERSONAL HISTORY OF PEPTIC ULCER DISEASE: ICD-10-CM

## 2024-06-17 DIAGNOSIS — I16.0 HYPERTENSIVE URGENCY: ICD-10-CM

## 2024-06-17 DIAGNOSIS — Z98.84 BARIATRIC SURGERY STATUS: ICD-10-CM

## 2024-06-17 DIAGNOSIS — I25.10 ATHEROSCLEROTIC HEART DISEASE OF NATIVE CORONARY ARTERY WITHOUT ANGINA PECTORIS: ICD-10-CM

## 2024-06-17 DIAGNOSIS — M50.30 OTHER CERVICAL DISC DEGENERATION, UNSPECIFIED CERVICAL REGION: ICD-10-CM

## 2024-06-27 ENCOUNTER — APPOINTMENT (OUTPATIENT)
Age: 59
End: 2024-06-27

## 2024-06-27 ENCOUNTER — OUTPATIENT (OUTPATIENT)
Dept: OUTPATIENT SERVICES | Facility: HOSPITAL | Age: 59
LOS: 1 days | End: 2024-06-27
Payer: COMMERCIAL

## 2024-06-27 DIAGNOSIS — I25.10 ATHEROSCLEROTIC HEART DISEASE OF NATIVE CORONARY ARTERY WITHOUT ANGINA PECTORIS: ICD-10-CM

## 2024-06-27 DIAGNOSIS — Z98.890 OTHER SPECIFIED POSTPROCEDURAL STATES: Chronic | ICD-10-CM

## 2024-06-27 DIAGNOSIS — Z90.710 ACQUIRED ABSENCE OF BOTH CERVIX AND UTERUS: Chronic | ICD-10-CM

## 2024-06-27 DIAGNOSIS — Z90.49 ACQUIRED ABSENCE OF OTHER SPECIFIED PARTS OF DIGESTIVE TRACT: Chronic | ICD-10-CM

## 2024-06-27 PROCEDURE — 93798 PHYS/QHP OP CAR RHAB W/ECG: CPT

## 2024-06-28 DIAGNOSIS — I25.10 ATHEROSCLEROTIC HEART DISEASE OF NATIVE CORONARY ARTERY WITHOUT ANGINA PECTORIS: ICD-10-CM

## 2024-07-01 ENCOUNTER — APPOINTMENT (OUTPATIENT)
Age: 59
End: 2024-07-01

## 2024-07-01 ENCOUNTER — OUTPATIENT (OUTPATIENT)
Dept: OUTPATIENT SERVICES | Facility: HOSPITAL | Age: 59
LOS: 1 days | End: 2024-07-01
Payer: COMMERCIAL

## 2024-07-01 DIAGNOSIS — Z90.710 ACQUIRED ABSENCE OF BOTH CERVIX AND UTERUS: Chronic | ICD-10-CM

## 2024-07-01 DIAGNOSIS — I25.10 ATHEROSCLEROTIC HEART DISEASE OF NATIVE CORONARY ARTERY WITHOUT ANGINA PECTORIS: ICD-10-CM

## 2024-07-01 DIAGNOSIS — Z98.890 OTHER SPECIFIED POSTPROCEDURAL STATES: Chronic | ICD-10-CM

## 2024-07-01 DIAGNOSIS — Z90.49 ACQUIRED ABSENCE OF OTHER SPECIFIED PARTS OF DIGESTIVE TRACT: Chronic | ICD-10-CM

## 2024-07-01 LAB — GLUCOSE BLDC GLUCOMTR-MCNC: 232 MG/DL — HIGH (ref 70–99)

## 2024-07-01 PROCEDURE — 82962 GLUCOSE BLOOD TEST: CPT

## 2024-07-01 PROCEDURE — 93798 PHYS/QHP OP CAR RHAB W/ECG: CPT

## 2024-07-02 ENCOUNTER — EMERGENCY (EMERGENCY)
Facility: HOSPITAL | Age: 59
LOS: 0 days | Discharge: HOME | End: 2024-07-02
Attending: STUDENT IN AN ORGANIZED HEALTH CARE EDUCATION/TRAINING PROGRAM
Payer: COMMERCIAL

## 2024-07-02 VITALS
SYSTOLIC BLOOD PRESSURE: 143 MMHG | RESPIRATION RATE: 18 BRPM | TEMPERATURE: 98 F | OXYGEN SATURATION: 99 % | HEART RATE: 63 BPM | DIASTOLIC BLOOD PRESSURE: 73 MMHG

## 2024-07-02 VITALS
TEMPERATURE: 98 F | SYSTOLIC BLOOD PRESSURE: 127 MMHG | OXYGEN SATURATION: 97 % | RESPIRATION RATE: 18 BRPM | HEART RATE: 75 BPM | DIASTOLIC BLOOD PRESSURE: 75 MMHG | WEIGHT: 210.1 LBS | HEIGHT: 64 IN

## 2024-07-02 DIAGNOSIS — Z98.890 OTHER SPECIFIED POSTPROCEDURAL STATES: Chronic | ICD-10-CM

## 2024-07-02 DIAGNOSIS — I25.10 ATHEROSCLEROTIC HEART DISEASE OF NATIVE CORONARY ARTERY WITHOUT ANGINA PECTORIS: ICD-10-CM

## 2024-07-02 DIAGNOSIS — Z90.49 ACQUIRED ABSENCE OF OTHER SPECIFIED PARTS OF DIGESTIVE TRACT: Chronic | ICD-10-CM

## 2024-07-02 DIAGNOSIS — Z90.710 ACQUIRED ABSENCE OF BOTH CERVIX AND UTERUS: Chronic | ICD-10-CM

## 2024-07-02 LAB
ALBUMIN SERPL ELPH-MCNC: 4.7 G/DL — SIGNIFICANT CHANGE UP (ref 3.5–5.2)
ALP SERPL-CCNC: 73 U/L — SIGNIFICANT CHANGE UP (ref 30–115)
ALT FLD-CCNC: 37 U/L — SIGNIFICANT CHANGE UP (ref 0–41)
ANION GAP SERPL CALC-SCNC: 11 MMOL/L — SIGNIFICANT CHANGE UP (ref 7–14)
AST SERPL-CCNC: 29 U/L — SIGNIFICANT CHANGE UP (ref 0–41)
BASOPHILS # BLD AUTO: 0.05 K/UL — SIGNIFICANT CHANGE UP (ref 0–0.2)
BASOPHILS NFR BLD AUTO: 0.8 % — SIGNIFICANT CHANGE UP (ref 0–1)
BILIRUB SERPL-MCNC: 0.6 MG/DL — SIGNIFICANT CHANGE UP (ref 0.2–1.2)
BUN SERPL-MCNC: 11 MG/DL — SIGNIFICANT CHANGE UP (ref 10–20)
CALCIUM SERPL-MCNC: 9.8 MG/DL — SIGNIFICANT CHANGE UP (ref 8.4–10.5)
CHLORIDE SERPL-SCNC: 104 MMOL/L — SIGNIFICANT CHANGE UP (ref 98–110)
CO2 SERPL-SCNC: 29 MMOL/L — SIGNIFICANT CHANGE UP (ref 17–32)
CREAT SERPL-MCNC: 0.8 MG/DL — SIGNIFICANT CHANGE UP (ref 0.7–1.5)
EGFR: 84 ML/MIN/1.73M2 — SIGNIFICANT CHANGE UP
EOSINOPHIL # BLD AUTO: 0.11 K/UL — SIGNIFICANT CHANGE UP (ref 0–0.7)
EOSINOPHIL NFR BLD AUTO: 1.8 % — SIGNIFICANT CHANGE UP (ref 0–8)
GLUCOSE SERPL-MCNC: 165 MG/DL — HIGH (ref 70–99)
HCT VFR BLD CALC: 39.4 % — SIGNIFICANT CHANGE UP (ref 37–47)
HGB BLD-MCNC: 13.4 G/DL — SIGNIFICANT CHANGE UP (ref 12–16)
IMM GRANULOCYTES NFR BLD AUTO: 0.3 % — SIGNIFICANT CHANGE UP (ref 0.1–0.3)
LYMPHOCYTES # BLD AUTO: 1.27 K/UL — SIGNIFICANT CHANGE UP (ref 1.2–3.4)
LYMPHOCYTES # BLD AUTO: 21 % — SIGNIFICANT CHANGE UP (ref 20.5–51.1)
MCHC RBC-ENTMCNC: 28.2 PG — SIGNIFICANT CHANGE UP (ref 27–31)
MCHC RBC-ENTMCNC: 34 G/DL — SIGNIFICANT CHANGE UP (ref 32–37)
MCV RBC AUTO: 82.8 FL — SIGNIFICANT CHANGE UP (ref 81–99)
MONOCYTES # BLD AUTO: 0.46 K/UL — SIGNIFICANT CHANGE UP (ref 0.1–0.6)
MONOCYTES NFR BLD AUTO: 7.6 % — SIGNIFICANT CHANGE UP (ref 1.7–9.3)
NEUTROPHILS # BLD AUTO: 4.13 K/UL — SIGNIFICANT CHANGE UP (ref 1.4–6.5)
NEUTROPHILS NFR BLD AUTO: 68.5 % — SIGNIFICANT CHANGE UP (ref 42.2–75.2)
NRBC # BLD: 0 /100 WBCS — SIGNIFICANT CHANGE UP (ref 0–0)
PLATELET # BLD AUTO: 229 K/UL — SIGNIFICANT CHANGE UP (ref 130–400)
PMV BLD: 9.6 FL — SIGNIFICANT CHANGE UP (ref 7.4–10.4)
POTASSIUM SERPL-MCNC: 4.3 MMOL/L — SIGNIFICANT CHANGE UP (ref 3.5–5)
POTASSIUM SERPL-SCNC: 4.3 MMOL/L — SIGNIFICANT CHANGE UP (ref 3.5–5)
PROT SERPL-MCNC: 6.6 G/DL — SIGNIFICANT CHANGE UP (ref 6–8)
RBC # BLD: 4.76 M/UL — SIGNIFICANT CHANGE UP (ref 4.2–5.4)
RBC # FLD: 13.6 % — SIGNIFICANT CHANGE UP (ref 11.5–14.5)
SODIUM SERPL-SCNC: 144 MMOL/L — SIGNIFICANT CHANGE UP (ref 135–146)
TROPONIN T, HIGH SENSITIVITY RESULT: 11 NG/L — SIGNIFICANT CHANGE UP (ref 6–13)
TROPONIN T, HIGH SENSITIVITY RESULT: 8 NG/L — SIGNIFICANT CHANGE UP (ref 6–13)
WBC # BLD: 6.04 K/UL — SIGNIFICANT CHANGE UP (ref 4.8–10.8)
WBC # FLD AUTO: 6.04 K/UL — SIGNIFICANT CHANGE UP (ref 4.8–10.8)

## 2024-07-02 PROCEDURE — 99285 EMERGENCY DEPT VISIT HI MDM: CPT

## 2024-07-02 PROCEDURE — 93005 ELECTROCARDIOGRAM TRACING: CPT

## 2024-07-02 PROCEDURE — 84484 ASSAY OF TROPONIN QUANT: CPT

## 2024-07-02 PROCEDURE — 93010 ELECTROCARDIOGRAM REPORT: CPT

## 2024-07-02 PROCEDURE — 71045 X-RAY EXAM CHEST 1 VIEW: CPT

## 2024-07-02 PROCEDURE — 82962 GLUCOSE BLOOD TEST: CPT

## 2024-07-02 PROCEDURE — 71045 X-RAY EXAM CHEST 1 VIEW: CPT | Mod: 26

## 2024-07-02 PROCEDURE — 85025 COMPLETE CBC W/AUTO DIFF WBC: CPT

## 2024-07-02 PROCEDURE — 36415 COLL VENOUS BLD VENIPUNCTURE: CPT

## 2024-07-02 PROCEDURE — 80053 COMPREHEN METABOLIC PANEL: CPT

## 2024-07-02 PROCEDURE — 99285 EMERGENCY DEPT VISIT HI MDM: CPT | Mod: 25

## 2024-07-03 ENCOUNTER — APPOINTMENT (OUTPATIENT)
Age: 59
End: 2024-07-03

## 2024-07-05 ENCOUNTER — APPOINTMENT (OUTPATIENT)
Age: 59
End: 2024-07-05

## 2024-07-05 DIAGNOSIS — Z91.041 RADIOGRAPHIC DYE ALLERGY STATUS: ICD-10-CM

## 2024-07-05 DIAGNOSIS — R42 DIZZINESS AND GIDDINESS: ICD-10-CM

## 2024-07-05 DIAGNOSIS — Z88.8 ALLERGY STATUS TO OTHER DRUGS, MEDICAMENTS AND BIOLOGICAL SUBSTANCES STATUS: ICD-10-CM

## 2024-07-05 DIAGNOSIS — R06.02 SHORTNESS OF BREATH: ICD-10-CM

## 2024-07-05 DIAGNOSIS — R07.89 OTHER CHEST PAIN: ICD-10-CM

## 2024-07-05 DIAGNOSIS — Z91.013 ALLERGY TO SEAFOOD: ICD-10-CM

## 2024-07-05 DIAGNOSIS — Z88.0 ALLERGY STATUS TO PENICILLIN: ICD-10-CM

## 2024-07-05 DIAGNOSIS — Z91.040 LATEX ALLERGY STATUS: ICD-10-CM

## 2024-07-05 DIAGNOSIS — Z88.2 ALLERGY STATUS TO SULFONAMIDES: ICD-10-CM

## 2024-07-08 ENCOUNTER — APPOINTMENT (OUTPATIENT)
Age: 59
End: 2024-07-08

## 2024-07-10 ENCOUNTER — APPOINTMENT (OUTPATIENT)
Age: 59
End: 2024-07-10

## 2024-07-12 ENCOUNTER — APPOINTMENT (OUTPATIENT)
Age: 59
End: 2024-07-12

## 2024-07-13 ENCOUNTER — NON-APPOINTMENT (OUTPATIENT)
Age: 59
End: 2024-07-13

## 2024-07-14 ENCOUNTER — EMERGENCY (EMERGENCY)
Facility: HOSPITAL | Age: 59
LOS: 1 days | Discharge: HOME | End: 2024-07-14
Attending: EMERGENCY MEDICINE | Admitting: EMERGENCY MEDICINE
Payer: COMMERCIAL

## 2024-07-14 VITALS
WEIGHT: 207.9 LBS | OXYGEN SATURATION: 96 % | RESPIRATION RATE: 16 BRPM | SYSTOLIC BLOOD PRESSURE: 149 MMHG | HEIGHT: 64 IN | TEMPERATURE: 98 F | DIASTOLIC BLOOD PRESSURE: 80 MMHG | HEART RATE: 70 BPM

## 2024-07-14 VITALS
RESPIRATION RATE: 18 BRPM | OXYGEN SATURATION: 99 % | TEMPERATURE: 99 F | HEART RATE: 63 BPM | DIASTOLIC BLOOD PRESSURE: 68 MMHG | SYSTOLIC BLOOD PRESSURE: 139 MMHG

## 2024-07-14 DIAGNOSIS — Z98.890 OTHER SPECIFIED POSTPROCEDURAL STATES: Chronic | ICD-10-CM

## 2024-07-14 DIAGNOSIS — Z90.49 ACQUIRED ABSENCE OF OTHER SPECIFIED PARTS OF DIGESTIVE TRACT: Chronic | ICD-10-CM

## 2024-07-14 DIAGNOSIS — Z90.710 ACQUIRED ABSENCE OF BOTH CERVIX AND UTERUS: Chronic | ICD-10-CM

## 2024-07-14 LAB
ALBUMIN SERPL ELPH-MCNC: 4.1 G/DL — SIGNIFICANT CHANGE UP (ref 3.3–5)
ALP SERPL-CCNC: 72 U/L — SIGNIFICANT CHANGE UP (ref 40–120)
ALT FLD-CCNC: 25 U/L — SIGNIFICANT CHANGE UP (ref 4–33)
ANION GAP SERPL CALC-SCNC: 11 MMOL/L — SIGNIFICANT CHANGE UP (ref 7–14)
AST SERPL-CCNC: 28 U/L — SIGNIFICANT CHANGE UP (ref 4–32)
BASOPHILS # BLD AUTO: 0.04 K/UL — SIGNIFICANT CHANGE UP (ref 0–0.2)
BASOPHILS NFR BLD AUTO: 0.6 % — SIGNIFICANT CHANGE UP (ref 0–2)
BILIRUB SERPL-MCNC: 0.3 MG/DL — SIGNIFICANT CHANGE UP (ref 0.2–1.2)
BUN SERPL-MCNC: 9 MG/DL — SIGNIFICANT CHANGE UP (ref 7–23)
CALCIUM SERPL-MCNC: 9.7 MG/DL — SIGNIFICANT CHANGE UP (ref 8.4–10.5)
CHLORIDE SERPL-SCNC: 106 MMOL/L — SIGNIFICANT CHANGE UP (ref 98–107)
CO2 SERPL-SCNC: 24 MMOL/L — SIGNIFICANT CHANGE UP (ref 22–31)
CREAT SERPL-MCNC: 0.66 MG/DL — SIGNIFICANT CHANGE UP (ref 0.5–1.3)
EGFR: 100 ML/MIN/1.73M2 — SIGNIFICANT CHANGE UP
EOSINOPHIL # BLD AUTO: 0.14 K/UL — SIGNIFICANT CHANGE UP (ref 0–0.5)
EOSINOPHIL NFR BLD AUTO: 2 % — SIGNIFICANT CHANGE UP (ref 0–6)
GLUCOSE SERPL-MCNC: 169 MG/DL — HIGH (ref 70–99)
HCT VFR BLD CALC: 38 % — SIGNIFICANT CHANGE UP (ref 34.5–45)
HGB BLD-MCNC: 13.2 G/DL — SIGNIFICANT CHANGE UP (ref 11.5–15.5)
IANC: 4.59 K/UL — SIGNIFICANT CHANGE UP (ref 1.8–7.4)
IMM GRANULOCYTES NFR BLD AUTO: 0.1 % — SIGNIFICANT CHANGE UP (ref 0–0.9)
LYMPHOCYTES # BLD AUTO: 1.57 K/UL — SIGNIFICANT CHANGE UP (ref 1–3.3)
LYMPHOCYTES # BLD AUTO: 22.9 % — SIGNIFICANT CHANGE UP (ref 13–44)
MCHC RBC-ENTMCNC: 28.3 PG — SIGNIFICANT CHANGE UP (ref 27–34)
MCHC RBC-ENTMCNC: 34.7 GM/DL — SIGNIFICANT CHANGE UP (ref 32–36)
MCV RBC AUTO: 81.5 FL — SIGNIFICANT CHANGE UP (ref 80–100)
MONOCYTES # BLD AUTO: 0.51 K/UL — SIGNIFICANT CHANGE UP (ref 0–0.9)
MONOCYTES NFR BLD AUTO: 7.4 % — SIGNIFICANT CHANGE UP (ref 2–14)
NEUTROPHILS # BLD AUTO: 4.59 K/UL — SIGNIFICANT CHANGE UP (ref 1.8–7.4)
NEUTROPHILS NFR BLD AUTO: 67 % — SIGNIFICANT CHANGE UP (ref 43–77)
NRBC # BLD: 0 /100 WBCS — SIGNIFICANT CHANGE UP (ref 0–0)
NRBC # FLD: 0 K/UL — SIGNIFICANT CHANGE UP (ref 0–0)
PLATELET # BLD AUTO: 181 K/UL — SIGNIFICANT CHANGE UP (ref 150–400)
POTASSIUM SERPL-MCNC: 4.3 MMOL/L — SIGNIFICANT CHANGE UP (ref 3.5–5.3)
POTASSIUM SERPL-SCNC: 4.3 MMOL/L — SIGNIFICANT CHANGE UP (ref 3.5–5.3)
PROT SERPL-MCNC: 6.7 G/DL — SIGNIFICANT CHANGE UP (ref 6–8.3)
RBC # BLD: 4.66 M/UL — SIGNIFICANT CHANGE UP (ref 3.8–5.2)
RBC # FLD: 14.3 % — SIGNIFICANT CHANGE UP (ref 10.3–14.5)
SODIUM SERPL-SCNC: 141 MMOL/L — SIGNIFICANT CHANGE UP (ref 135–145)
TROPONIN T, HIGH SENSITIVITY RESULT: 10 NG/L — SIGNIFICANT CHANGE UP
WBC # BLD: 6.86 K/UL — SIGNIFICANT CHANGE UP (ref 3.8–10.5)
WBC # FLD AUTO: 6.86 K/UL — SIGNIFICANT CHANGE UP (ref 3.8–10.5)

## 2024-07-14 PROCEDURE — 93010 ELECTROCARDIOGRAM REPORT: CPT

## 2024-07-14 PROCEDURE — 71046 X-RAY EXAM CHEST 2 VIEWS: CPT | Mod: 26

## 2024-07-14 PROCEDURE — 99284 EMERGENCY DEPT VISIT MOD MDM: CPT

## 2024-07-14 RX ORDER — ACETAMINOPHEN 325 MG
975 TABLET ORAL ONCE
Refills: 0 | Status: COMPLETED | OUTPATIENT
Start: 2024-07-14 | End: 2024-07-14

## 2024-07-15 ENCOUNTER — APPOINTMENT (OUTPATIENT)
Age: 59
End: 2024-07-15

## 2024-07-17 ENCOUNTER — APPOINTMENT (OUTPATIENT)
Age: 59
End: 2024-07-17

## 2024-07-19 ENCOUNTER — APPOINTMENT (OUTPATIENT)
Age: 59
End: 2024-07-19

## 2024-07-22 ENCOUNTER — APPOINTMENT (OUTPATIENT)
Age: 59
End: 2024-07-22

## 2024-07-24 ENCOUNTER — APPOINTMENT (OUTPATIENT)
Age: 59
End: 2024-07-24

## 2024-07-26 ENCOUNTER — APPOINTMENT (OUTPATIENT)
Age: 59
End: 2024-07-26

## 2024-07-29 ENCOUNTER — APPOINTMENT (OUTPATIENT)
Age: 59
End: 2024-07-29

## 2024-07-31 ENCOUNTER — APPOINTMENT (OUTPATIENT)
Age: 59
End: 2024-07-31

## 2024-08-02 ENCOUNTER — APPOINTMENT (OUTPATIENT)
Age: 59
End: 2024-08-02

## 2024-08-05 ENCOUNTER — APPOINTMENT (OUTPATIENT)
Age: 59
End: 2024-08-05

## 2024-08-07 ENCOUNTER — APPOINTMENT (OUTPATIENT)
Age: 59
End: 2024-08-07

## 2024-08-09 ENCOUNTER — APPOINTMENT (OUTPATIENT)
Age: 59
End: 2024-08-09

## 2024-08-12 ENCOUNTER — APPOINTMENT (OUTPATIENT)
Age: 59
End: 2024-08-12

## 2024-08-14 ENCOUNTER — APPOINTMENT (OUTPATIENT)
Age: 59
End: 2024-08-14

## 2024-08-14 ENCOUNTER — APPOINTMENT (OUTPATIENT)
Dept: PULMONOLOGY | Facility: CLINIC | Age: 59
End: 2024-08-14
Payer: COMMERCIAL

## 2024-08-14 VITALS
WEIGHT: 205 LBS | OXYGEN SATURATION: 98 % | SYSTOLIC BLOOD PRESSURE: 116 MMHG | HEART RATE: 69 BPM | BODY MASS INDEX: 35 KG/M2 | HEIGHT: 64 IN | DIASTOLIC BLOOD PRESSURE: 66 MMHG

## 2024-08-14 DIAGNOSIS — R09.82 POSTNASAL DRIP: ICD-10-CM

## 2024-08-14 DIAGNOSIS — G47.33 OBSTRUCTIVE SLEEP APNEA (ADULT) (PEDIATRIC): ICD-10-CM

## 2024-08-14 DIAGNOSIS — R05.3 CHRONIC COUGH: ICD-10-CM

## 2024-08-14 PROCEDURE — G2211 COMPLEX E/M VISIT ADD ON: CPT | Mod: NC

## 2024-08-14 PROCEDURE — 99213 OFFICE O/P EST LOW 20 MIN: CPT

## 2024-08-14 NOTE — DISCUSSION/SUMMARY
[FreeTextEntry1] : COUGH/ PND/ HAAD NASAL SPRAYS WEIGHT LOSS MULTIPLE LUNG NODULES STABLE SOB SP STENT ON BRILINTA 9/11 EXPOSURE

## 2024-08-16 ENCOUNTER — APPOINTMENT (OUTPATIENT)
Age: 59
End: 2024-08-16

## 2024-08-19 ENCOUNTER — APPOINTMENT (OUTPATIENT)
Age: 59
End: 2024-08-19

## 2024-08-21 ENCOUNTER — APPOINTMENT (OUTPATIENT)
Age: 59
End: 2024-08-21

## 2024-08-23 ENCOUNTER — APPOINTMENT (OUTPATIENT)
Age: 59
End: 2024-08-23

## 2024-08-26 ENCOUNTER — APPOINTMENT (OUTPATIENT)
Age: 59
End: 2024-08-26

## 2024-08-28 ENCOUNTER — APPOINTMENT (OUTPATIENT)
Age: 59
End: 2024-08-28

## 2024-08-29 ENCOUNTER — NON-APPOINTMENT (OUTPATIENT)
Age: 59
End: 2024-08-29

## 2024-08-30 ENCOUNTER — APPOINTMENT (OUTPATIENT)
Age: 59
End: 2024-08-30

## 2024-09-04 ENCOUNTER — APPOINTMENT (OUTPATIENT)
Age: 59
End: 2024-09-04

## 2024-09-06 ENCOUNTER — APPOINTMENT (OUTPATIENT)
Age: 59
End: 2024-09-06

## 2024-09-09 ENCOUNTER — APPOINTMENT (OUTPATIENT)
Age: 59
End: 2024-09-09

## 2024-09-11 ENCOUNTER — APPOINTMENT (OUTPATIENT)
Age: 59
End: 2024-09-11

## 2024-09-11 DIAGNOSIS — N63.0 UNSPECIFIED LUMP IN UNSPECIFIED BREAST: ICD-10-CM

## 2024-09-13 ENCOUNTER — APPOINTMENT (OUTPATIENT)
Age: 59
End: 2024-09-13

## 2024-09-16 ENCOUNTER — APPOINTMENT (OUTPATIENT)
Age: 59
End: 2024-09-16

## 2024-09-16 ENCOUNTER — RESULT REVIEW (OUTPATIENT)
Age: 59
End: 2024-09-16

## 2024-09-16 ENCOUNTER — OUTPATIENT (OUTPATIENT)
Dept: OUTPATIENT SERVICES | Facility: HOSPITAL | Age: 59
LOS: 1 days | End: 2024-09-16
Payer: COMMERCIAL

## 2024-09-16 DIAGNOSIS — Z90.49 ACQUIRED ABSENCE OF OTHER SPECIFIED PARTS OF DIGESTIVE TRACT: Chronic | ICD-10-CM

## 2024-09-16 DIAGNOSIS — Z90.710 ACQUIRED ABSENCE OF BOTH CERVIX AND UTERUS: Chronic | ICD-10-CM

## 2024-09-16 DIAGNOSIS — N63.10 UNSPECIFIED LUMP IN THE RIGHT BREAST, UNSPECIFIED QUADRANT: ICD-10-CM

## 2024-09-16 DIAGNOSIS — Z98.890 OTHER SPECIFIED POSTPROCEDURAL STATES: Chronic | ICD-10-CM

## 2024-09-16 DIAGNOSIS — N64.59 OTHER SIGNS AND SYMPTOMS IN BREAST: ICD-10-CM

## 2024-09-16 PROCEDURE — 76642 ULTRASOUND BREAST LIMITED: CPT | Mod: 26,RT

## 2024-09-16 PROCEDURE — 77061 BREAST TOMOSYNTHESIS UNI: CPT | Mod: 26,RT

## 2024-09-16 PROCEDURE — 76642 ULTRASOUND BREAST LIMITED: CPT | Mod: RT

## 2024-09-16 PROCEDURE — 77065 DX MAMMO INCL CAD UNI: CPT | Mod: RT

## 2024-09-16 PROCEDURE — G0279: CPT

## 2024-09-16 PROCEDURE — G0279: CPT | Mod: 26

## 2024-09-16 PROCEDURE — 77065 DX MAMMO INCL CAD UNI: CPT | Mod: 26,RT

## 2024-09-17 DIAGNOSIS — N63.10 UNSPECIFIED LUMP IN THE RIGHT BREAST, UNSPECIFIED QUADRANT: ICD-10-CM

## 2024-09-18 ENCOUNTER — APPOINTMENT (OUTPATIENT)
Age: 59
End: 2024-09-18

## 2024-09-19 ENCOUNTER — RESULT REVIEW (OUTPATIENT)
Age: 59
End: 2024-09-19

## 2024-09-19 ENCOUNTER — OUTPATIENT (OUTPATIENT)
Dept: OUTPATIENT SERVICES | Facility: HOSPITAL | Age: 59
LOS: 1 days | End: 2024-09-19
Payer: COMMERCIAL

## 2024-09-19 DIAGNOSIS — Z90.710 ACQUIRED ABSENCE OF BOTH CERVIX AND UTERUS: Chronic | ICD-10-CM

## 2024-09-19 DIAGNOSIS — Z98.890 OTHER SPECIFIED POSTPROCEDURAL STATES: Chronic | ICD-10-CM

## 2024-09-19 DIAGNOSIS — R10.2 PELVIC AND PERINEAL PAIN: ICD-10-CM

## 2024-09-19 DIAGNOSIS — Z00.8 ENCOUNTER FOR OTHER GENERAL EXAMINATION: ICD-10-CM

## 2024-09-19 DIAGNOSIS — Z90.49 ACQUIRED ABSENCE OF OTHER SPECIFIED PARTS OF DIGESTIVE TRACT: Chronic | ICD-10-CM

## 2024-09-19 PROCEDURE — 76856 US EXAM PELVIC COMPLETE: CPT | Mod: 26

## 2024-09-19 PROCEDURE — 76856 US EXAM PELVIC COMPLETE: CPT

## 2024-09-20 ENCOUNTER — APPOINTMENT (OUTPATIENT)
Age: 59
End: 2024-09-20

## 2024-09-20 ENCOUNTER — APPOINTMENT (OUTPATIENT)
Dept: CARDIOLOGY | Facility: CLINIC | Age: 59
End: 2024-09-20
Payer: COMMERCIAL

## 2024-09-20 VITALS
BODY MASS INDEX: 34.49 KG/M2 | OXYGEN SATURATION: 96 % | HEART RATE: 68 BPM | HEIGHT: 64 IN | DIASTOLIC BLOOD PRESSURE: 80 MMHG | SYSTOLIC BLOOD PRESSURE: 140 MMHG | WEIGHT: 202 LBS

## 2024-09-20 DIAGNOSIS — E78.5 HYPERLIPIDEMIA, UNSPECIFIED: ICD-10-CM

## 2024-09-20 DIAGNOSIS — I25.118 ATHEROSCLEROTIC HEART DISEASE OF NATIVE CORONARY ARTERY WITH OTHER FORMS OF ANGINA PECTORIS: ICD-10-CM

## 2024-09-20 DIAGNOSIS — I10 ESSENTIAL (PRIMARY) HYPERTENSION: ICD-10-CM

## 2024-09-20 DIAGNOSIS — R10.2 PELVIC AND PERINEAL PAIN: ICD-10-CM

## 2024-09-20 DIAGNOSIS — E11.9 TYPE 2 DIABETES MELLITUS W/OUT COMPLICATIONS: ICD-10-CM

## 2024-09-20 DIAGNOSIS — R06.02 SHORTNESS OF BREATH: ICD-10-CM

## 2024-09-20 PROCEDURE — 93000 ELECTROCARDIOGRAM COMPLETE: CPT

## 2024-09-20 PROCEDURE — G2211 COMPLEX E/M VISIT ADD ON: CPT | Mod: NC

## 2024-09-20 PROCEDURE — 99214 OFFICE O/P EST MOD 30 MIN: CPT | Mod: 25

## 2024-09-20 RX ORDER — ROSUVASTATIN CALCIUM 10 MG/1
10 TABLET, FILM COATED ORAL
Refills: 0 | Status: ACTIVE | COMMUNITY

## 2024-09-20 RX ORDER — METFORMIN HYDROCHLORIDE 500 MG/1
500 TABLET, COATED ORAL
Refills: 0 | Status: ACTIVE | COMMUNITY

## 2024-09-20 RX ORDER — INSULIN ASPART 100 [IU]/ML
INJECTION, SOLUTION INTRAVENOUS; SUBCUTANEOUS
Refills: 0 | Status: ACTIVE | COMMUNITY

## 2024-09-20 RX ORDER — NEBIVOLOL HYDROCHLORIDE 5 MG/1
5 TABLET ORAL DAILY
Refills: 0 | Status: ACTIVE | COMMUNITY

## 2024-09-20 RX ORDER — PRASUGREL 10 MG/1
10 TABLET, FILM COATED ORAL
Qty: 90 | Refills: 3 | Status: ACTIVE | COMMUNITY
Start: 2024-09-20 | End: 1900-01-01

## 2024-09-21 NOTE — CARDIOLOGY SUMMARY
[de-identified] : 09/20/2024: NSR, normal axis, normal intervals, (-) ST-T wave changes. =======================================================

## 2024-09-21 NOTE — HISTORY OF PRESENT ILLNESS
[FreeTextEntry1] : MAGGI BHAGAT is a 59-year-old female, with a PMHx significant for CAD s/p LCx and OM1 in 6/2024 (on Brilinta 90 mg BID), HTN, HLD, and DM, who presents today for consultation regarding need for further revascularization. Patient endorses SOB at rest. Indicates she walks daily and also has exertional SOB. On cardiac catheterization, she was noted to have complete occlusion of the D1.   Social History: (-) Smoking, (-) EtOH, (-) Illicit drug use. Allergies: Aspirin

## 2024-09-21 NOTE — CARDIOLOGY SUMMARY
[de-identified] : 09/20/2024: NSR, normal axis, normal intervals, (-) ST-T wave changes. =======================================================

## 2024-09-21 NOTE — DISCUSSION/SUMMARY
[EKG obtained to assist in diagnosis and management of assessed problem(s)] : EKG obtained to assist in diagnosis and management of assessed problem(s) [FreeTextEntry1] : EKG performed today was unremarkable.  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 Patient cannot tolerate Aspirin due to allergy. Discontinue Brilinta and start Prasugrel HCl 10 mg QD. Recommend an exercise stress echocardiogram to evaluate for residual ischemia and symptoms to assess if there is a need for further revascularization of the D1.   HTN: The impression is hypertension. Currently, the condition is controlled. There are no changes in medication management. Continue current medical therapy. Other planned treatments include an exercise regimen, weight loss, low sodium diet, and alcohol moderation.  HLD: The impression is hyperlipidemia. Currently, the condition is stable. There are no changes in medication management. Continue current medical therapy. Other planned treatment includes diet modification, exercise, and weight loss.  DM: The impression is diabetes mellitus. There are no changes in medication management. Patient advised to continue taking medications as prescribed, closely monitor blood sugar at home, follow a diabetic diet, exercise, lose weight, and follow up for continued monitoring. Discussed importance of diet and exercise to improve glycemic control.  Instructed to follow up after stress echo is complete.  Plan was discussed with the patient.

## 2024-09-23 ENCOUNTER — APPOINTMENT (OUTPATIENT)
Age: 59
End: 2024-09-23

## 2024-09-25 ENCOUNTER — APPOINTMENT (OUTPATIENT)
Age: 59
End: 2024-09-25

## 2024-10-01 ENCOUNTER — APPOINTMENT (OUTPATIENT)
Dept: CARDIOLOGY | Facility: CLINIC | Age: 59
End: 2024-10-01
Payer: COMMERCIAL

## 2024-10-01 DIAGNOSIS — E11.9 TYPE 2 DIABETES MELLITUS W/OUT COMPLICATIONS: ICD-10-CM

## 2024-10-01 DIAGNOSIS — I25.118 ATHEROSCLEROTIC HEART DISEASE OF NATIVE CORONARY ARTERY WITH OTHER FORMS OF ANGINA PECTORIS: ICD-10-CM

## 2024-10-01 DIAGNOSIS — I10 ESSENTIAL (PRIMARY) HYPERTENSION: ICD-10-CM

## 2024-10-01 DIAGNOSIS — R06.02 SHORTNESS OF BREATH: ICD-10-CM

## 2024-10-01 PROCEDURE — 93325 DOPPLER ECHO COLOR FLOW MAPG: CPT

## 2024-10-01 PROCEDURE — 99214 OFFICE O/P EST MOD 30 MIN: CPT

## 2024-10-01 PROCEDURE — 93320 DOPPLER ECHO COMPLETE: CPT

## 2024-10-01 PROCEDURE — 93351 STRESS TTE COMPLETE: CPT

## 2024-10-01 PROCEDURE — G2211 COMPLEX E/M VISIT ADD ON: CPT | Mod: NC

## 2024-10-02 NOTE — CARDIOLOGY SUMMARY
[de-identified] : 09/20/2024: NSR, normal axis, normal intervals, (-) ST-T wave changes. =======================================================

## 2024-10-02 NOTE — DISCUSSION/SUMMARY
[FreeTextEntry1] : Atherosclerosis of the Native Arteries with Known Angina; s/p PCI of LCx and OM1: Stress echocardiogram performed today revealed no evidence of exercise-induced ischemia at 85% MPHR. No indication for revascularization at the current time. Will switch from Brilinta to Prasugrel as patient has SOB.   HTN: The impression is hypertension. Currently, the condition is controlled. There are no changes in medication management. Continue current medical therapy. Other planned treatments include an exercise regimen, weight loss, low sodium diet, and alcohol moderation.  HLD: The impression is hyperlipidemia. Currently, the condition is stable. There are no changes in medication management. Continue current medical therapy. Other planned treatment includes diet modification, exercise, and weight loss.  DM: The impression is diabetes mellitus. There are no changes in medication management. Patient advised to continue taking medications as prescribed, closely monitor blood sugar at home, follow a diabetic diet, exercise, lose weight, and follow up for continued monitoring. Discussed importance of diet and exercise to improve glycemic control.  Plan was discussed with the patient.

## 2024-10-02 NOTE — HISTORY OF PRESENT ILLNESS
[FreeTextEntry1] : MAGGI BHAGAT is a 59-year-old female, with a PMHx significant for CAD s/p LCx and OM1 in 6/2024 (on Brilinta 90 mg BID), HTN, HLD, and DM, who presents today for a follow-up visit for a stress echo. Patient was initially seen on 9/20/2024, at which time she endorsed SOB at rest and with exertion. On cardiac catheterization, she was noted to have complete occlusion of the D1.   Social History: (-) Smoking, (-) EtOH, (-) Illicit drug use. Allergies: Aspirin

## 2024-10-02 NOTE — CARDIOLOGY SUMMARY
[de-identified] : 09/20/2024: NSR, normal axis, normal intervals, (-) ST-T wave changes. =======================================================

## 2024-10-08 ENCOUNTER — APPOINTMENT (OUTPATIENT)
Dept: OBGYN | Facility: CLINIC | Age: 59
End: 2024-10-08

## 2024-10-08 VITALS
SYSTOLIC BLOOD PRESSURE: 121 MMHG | TEMPERATURE: 98.6 F | HEART RATE: 65 BPM | WEIGHT: 208 LBS | DIASTOLIC BLOOD PRESSURE: 69 MMHG | HEIGHT: 64 IN | BODY MASS INDEX: 35.51 KG/M2

## 2024-10-08 DIAGNOSIS — N89.8 OTHER SPECIFIED NONINFLAMMATORY DISORDERS OF VAGINA: ICD-10-CM

## 2024-10-08 DIAGNOSIS — E66.9 OBESITY, UNSPECIFIED: ICD-10-CM

## 2024-10-08 LAB
BILIRUB UR QL STRIP: NORMAL
CLARITY UR: CLEAR
COLLECTION METHOD: NORMAL
GLUCOSE UR-MCNC: >=1000
HCG UR QL: 0.2 EU/DL
HGB UR QL STRIP.AUTO: NORMAL
KETONES UR-MCNC: NORMAL
LEUKOCYTE ESTERASE UR QL STRIP: NORMAL
NITRITE UR QL STRIP: NORMAL
PH UR STRIP: 5.5
PROT UR STRIP-MCNC: NORMAL
SP GR UR STRIP: 1.01

## 2024-10-08 PROCEDURE — 99214 OFFICE O/P EST MOD 30 MIN: CPT | Mod: 25

## 2024-10-08 PROCEDURE — 81003 URINALYSIS AUTO W/O SCOPE: CPT | Mod: QW

## 2024-10-08 RX ORDER — METRONIDAZOLE 7.5 MG/G
0.75 GEL VAGINAL
Qty: 1 | Refills: 0 | Status: ACTIVE | COMMUNITY
Start: 2024-10-08 | End: 1900-01-01

## 2024-10-17 ENCOUNTER — APPOINTMENT (OUTPATIENT)
Dept: CARDIOLOGY | Facility: CLINIC | Age: 59
End: 2024-10-17
Payer: COMMERCIAL

## 2024-10-17 VITALS
WEIGHT: 208 LBS | OXYGEN SATURATION: 98 % | DIASTOLIC BLOOD PRESSURE: 82 MMHG | HEART RATE: 83 BPM | HEIGHT: 64 IN | BODY MASS INDEX: 35.51 KG/M2 | RESPIRATION RATE: 15 BRPM | SYSTOLIC BLOOD PRESSURE: 142 MMHG

## 2024-10-17 DIAGNOSIS — I10 ESSENTIAL (PRIMARY) HYPERTENSION: ICD-10-CM

## 2024-10-17 DIAGNOSIS — E11.9 TYPE 2 DIABETES MELLITUS W/OUT COMPLICATIONS: ICD-10-CM

## 2024-10-17 DIAGNOSIS — E78.5 HYPERLIPIDEMIA, UNSPECIFIED: ICD-10-CM

## 2024-10-17 DIAGNOSIS — R07.9 CHEST PAIN, UNSPECIFIED: ICD-10-CM

## 2024-10-17 DIAGNOSIS — I25.10 ATHEROSCLEROTIC HEART DISEASE OF NATIVE CORONARY ARTERY W/OUT ANGINA PECTORIS: ICD-10-CM

## 2024-10-17 PROCEDURE — G2211 COMPLEX E/M VISIT ADD ON: CPT | Mod: NC

## 2024-10-17 PROCEDURE — 93000 ELECTROCARDIOGRAM COMPLETE: CPT

## 2024-10-17 PROCEDURE — 99214 OFFICE O/P EST MOD 30 MIN: CPT

## 2024-11-13 ENCOUNTER — APPOINTMENT (OUTPATIENT)
Dept: PULMONOLOGY | Facility: CLINIC | Age: 59
End: 2024-11-13
Payer: COMMERCIAL

## 2024-11-13 VITALS
HEIGHT: 64 IN | OXYGEN SATURATION: 98 % | DIASTOLIC BLOOD PRESSURE: 64 MMHG | SYSTOLIC BLOOD PRESSURE: 118 MMHG | BODY MASS INDEX: 35.51 KG/M2 | WEIGHT: 208 LBS | HEART RATE: 92 BPM

## 2024-11-13 DIAGNOSIS — R05.3 CHRONIC COUGH: ICD-10-CM

## 2024-11-13 DIAGNOSIS — R05.9 COUGH, UNSPECIFIED: ICD-10-CM

## 2024-11-13 PROCEDURE — 99213 OFFICE O/P EST LOW 20 MIN: CPT

## 2024-11-13 PROCEDURE — G2211 COMPLEX E/M VISIT ADD ON: CPT | Mod: NC

## 2024-11-30 PROBLEM — Z71.2 ENCOUNTER TO DISCUSS TEST RESULTS: Status: ACTIVE | Noted: 2020-03-04

## 2024-11-30 PROBLEM — N63.0 LUMP OF BREAST: Status: ACTIVE | Noted: 2024-09-11

## 2025-01-07 ENCOUNTER — APPOINTMENT (OUTPATIENT)
Dept: PULMONOLOGY | Facility: CLINIC | Age: 60
End: 2025-01-07
Payer: COMMERCIAL

## 2025-01-07 VITALS
OXYGEN SATURATION: 99 % | HEART RATE: 77 BPM | HEIGHT: 64 IN | SYSTOLIC BLOOD PRESSURE: 110 MMHG | BODY MASS INDEX: 35.17 KG/M2 | DIASTOLIC BLOOD PRESSURE: 80 MMHG | RESPIRATION RATE: 14 BRPM | WEIGHT: 206 LBS

## 2025-01-07 DIAGNOSIS — G47.33 OBSTRUCTIVE SLEEP APNEA (ADULT) (PEDIATRIC): ICD-10-CM

## 2025-01-07 DIAGNOSIS — R06.02 SHORTNESS OF BREATH: ICD-10-CM

## 2025-01-07 DIAGNOSIS — R05.3 CHRONIC COUGH: ICD-10-CM

## 2025-01-07 PROCEDURE — G2211 COMPLEX E/M VISIT ADD ON: CPT | Mod: NC

## 2025-01-07 PROCEDURE — 99213 OFFICE O/P EST LOW 20 MIN: CPT

## 2025-01-14 ENCOUNTER — APPOINTMENT (OUTPATIENT)
Dept: PULMONOLOGY | Facility: HOSPITAL | Age: 60
End: 2025-01-14

## 2025-02-13 ENCOUNTER — APPOINTMENT (OUTPATIENT)
Dept: ORTHOPEDIC SURGERY | Facility: CLINIC | Age: 60
End: 2025-02-13
Payer: COMMERCIAL

## 2025-02-13 DIAGNOSIS — M17.12 UNILATERAL PRIMARY OSTEOARTHRITIS, LEFT KNEE: ICD-10-CM

## 2025-02-13 PROCEDURE — 73562 X-RAY EXAM OF KNEE 3: CPT | Mod: LT

## 2025-02-13 PROCEDURE — 99213 OFFICE O/P EST LOW 20 MIN: CPT

## 2025-02-13 RX ORDER — TRAMADOL HYDROCHLORIDE 50 MG/1
50 TABLET, COATED ORAL
Qty: 14 | Refills: 0 | Status: ACTIVE | COMMUNITY
Start: 2025-02-13 | End: 1900-01-01

## 2025-02-26 ENCOUNTER — APPOINTMENT (OUTPATIENT)
Age: 60
End: 2025-02-26
Payer: COMMERCIAL

## 2025-02-26 DIAGNOSIS — M20.42 OTHER HAMMER TOE(S) (ACQUIRED), LEFT FOOT: ICD-10-CM

## 2025-02-26 DIAGNOSIS — M79.672 PAIN IN LEFT FOOT: ICD-10-CM

## 2025-02-26 DIAGNOSIS — M79.671 PAIN IN RIGHT FOOT: ICD-10-CM

## 2025-02-26 DIAGNOSIS — M20.41 OTHER HAMMER TOE(S) (ACQUIRED), RIGHT FOOT: ICD-10-CM

## 2025-02-26 DIAGNOSIS — E11.42 TYPE 2 DIABETES MELLITUS WITH DIABETIC POLYNEUROPATHY: ICD-10-CM

## 2025-02-26 DIAGNOSIS — L84 CORNS AND CALLOSITIES: ICD-10-CM

## 2025-02-26 DIAGNOSIS — B35.1 TINEA UNGUIUM: ICD-10-CM

## 2025-02-26 PROCEDURE — 11720 DEBRIDE NAIL 1-5: CPT | Mod: 59

## 2025-02-26 PROCEDURE — 11056 PARNG/CUTG B9 HYPRKR LES 2-4: CPT

## 2025-02-26 PROCEDURE — 99213 OFFICE O/P EST LOW 20 MIN: CPT | Mod: 25

## 2025-02-26 NOTE — H&P PST ADULT - DOCUMENT STATUS
Patient did not return phone call from yesterday 2/25, sent portal message to patient.   Authored by Resident/PA/NP

## 2025-02-27 ENCOUNTER — RESULT REVIEW (OUTPATIENT)
Age: 60
End: 2025-02-27

## 2025-02-27 ENCOUNTER — OUTPATIENT (OUTPATIENT)
Dept: OUTPATIENT SERVICES | Facility: HOSPITAL | Age: 60
LOS: 1 days | End: 2025-02-27
Payer: COMMERCIAL

## 2025-02-27 DIAGNOSIS — Z00.8 ENCOUNTER FOR OTHER GENERAL EXAMINATION: ICD-10-CM

## 2025-02-27 DIAGNOSIS — Z90.710 ACQUIRED ABSENCE OF BOTH CERVIX AND UTERUS: Chronic | ICD-10-CM

## 2025-02-27 DIAGNOSIS — Z98.890 OTHER SPECIFIED POSTPROCEDURAL STATES: Chronic | ICD-10-CM

## 2025-02-27 DIAGNOSIS — Z90.49 ACQUIRED ABSENCE OF OTHER SPECIFIED PARTS OF DIGESTIVE TRACT: Chronic | ICD-10-CM

## 2025-02-27 DIAGNOSIS — Z12.31 ENCOUNTER FOR SCREENING MAMMOGRAM FOR MALIGNANT NEOPLASM OF BREAST: ICD-10-CM

## 2025-02-27 PROCEDURE — 77067 SCR MAMMO BI INCL CAD: CPT | Mod: 26

## 2025-02-27 PROCEDURE — 77063 BREAST TOMOSYNTHESIS BI: CPT | Mod: 26

## 2025-02-27 PROCEDURE — 77067 SCR MAMMO BI INCL CAD: CPT

## 2025-02-27 PROCEDURE — 77063 BREAST TOMOSYNTHESIS BI: CPT

## 2025-02-28 DIAGNOSIS — Z12.31 ENCOUNTER FOR SCREENING MAMMOGRAM FOR MALIGNANT NEOPLASM OF BREAST: ICD-10-CM

## 2025-03-04 ENCOUNTER — APPOINTMENT (OUTPATIENT)
Dept: ORTHOPEDIC SURGERY | Facility: CLINIC | Age: 60
End: 2025-03-04

## 2025-03-18 ENCOUNTER — APPOINTMENT (OUTPATIENT)
Dept: PULMONOLOGY | Facility: HOSPITAL | Age: 60
End: 2025-03-18

## 2025-03-20 ENCOUNTER — OUTPATIENT (OUTPATIENT)
Dept: OUTPATIENT SERVICES | Facility: HOSPITAL | Age: 60
LOS: 1 days | Discharge: HOME | End: 2025-03-20
Payer: COMMERCIAL

## 2025-03-20 VITALS
OXYGEN SATURATION: 98 % | RESPIRATION RATE: 17 BRPM | DIASTOLIC BLOOD PRESSURE: 90 MMHG | SYSTOLIC BLOOD PRESSURE: 165 MMHG | HEIGHT: 64 IN | WEIGHT: 209 LBS | HEART RATE: 66 BPM | TEMPERATURE: 98 F

## 2025-03-20 VITALS
OXYGEN SATURATION: 98 % | HEART RATE: 65 BPM | WEIGHT: 209 LBS | DIASTOLIC BLOOD PRESSURE: 73 MMHG | RESPIRATION RATE: 17 BRPM | HEIGHT: 64 IN | TEMPERATURE: 98 F | SYSTOLIC BLOOD PRESSURE: 153 MMHG

## 2025-03-20 DIAGNOSIS — Z79.4 LONG TERM (CURRENT) USE OF INSULIN: ICD-10-CM

## 2025-03-20 DIAGNOSIS — H26.8 OTHER SPECIFIED CATARACT: ICD-10-CM

## 2025-03-20 DIAGNOSIS — H25.11 AGE-RELATED NUCLEAR CATARACT, RIGHT EYE: ICD-10-CM

## 2025-03-20 DIAGNOSIS — Z79.02 LONG TERM (CURRENT) USE OF ANTITHROMBOTICS/ANTIPLATELETS: ICD-10-CM

## 2025-03-20 DIAGNOSIS — E11.36 TYPE 2 DIABETES MELLITUS WITH DIABETIC CATARACT: ICD-10-CM

## 2025-03-20 DIAGNOSIS — Z98.890 OTHER SPECIFIED POSTPROCEDURAL STATES: Chronic | ICD-10-CM

## 2025-03-20 DIAGNOSIS — E66.9 OBESITY, UNSPECIFIED: ICD-10-CM

## 2025-03-20 DIAGNOSIS — I10 ESSENTIAL (PRIMARY) HYPERTENSION: ICD-10-CM

## 2025-03-20 DIAGNOSIS — Z91.041 RADIOGRAPHIC DYE ALLERGY STATUS: ICD-10-CM

## 2025-03-20 DIAGNOSIS — Z88.2 ALLERGY STATUS TO SULFONAMIDES: ICD-10-CM

## 2025-03-20 DIAGNOSIS — Z87.01 PERSONAL HISTORY OF PNEUMONIA (RECURRENT): ICD-10-CM

## 2025-03-20 DIAGNOSIS — Z90.710 ACQUIRED ABSENCE OF BOTH CERVIX AND UTERUS: Chronic | ICD-10-CM

## 2025-03-20 DIAGNOSIS — Z90.49 ACQUIRED ABSENCE OF OTHER SPECIFIED PARTS OF DIGESTIVE TRACT: Chronic | ICD-10-CM

## 2025-03-20 DIAGNOSIS — Z85.42 PERSONAL HISTORY OF MALIGNANT NEOPLASM OF OTHER PARTS OF UTERUS: ICD-10-CM

## 2025-03-20 DIAGNOSIS — Z79.84 LONG TERM (CURRENT) USE OF ORAL HYPOGLYCEMIC DRUGS: ICD-10-CM

## 2025-03-20 DIAGNOSIS — Z91.040 LATEX ALLERGY STATUS: ICD-10-CM

## 2025-03-20 PROCEDURE — V2632: CPT

## 2025-03-20 PROCEDURE — 82962 GLUCOSE BLOOD TEST: CPT

## 2025-03-20 RX ORDER — CLOPIDOGREL BISULFATE 75 MG/1
1 TABLET, FILM COATED ORAL
Refills: 0 | DISCHARGE

## 2025-03-20 RX ORDER — INSULIN DEGLUDEC 100 U/ML
0 INJECTION, SOLUTION SUBCUTANEOUS
Refills: 0 | DISCHARGE

## 2025-03-20 NOTE — ASU PATIENT PROFILE, ADULT - FALL HARM RISK - HARM RISK INTERVENTIONS

## 2025-03-20 NOTE — ASU PREOP CHECKLIST - SELECT TESTS ORDERED
I spoke to patient's wife Mrs. Barrios and aware that her husbands labs were normal except her Vitamin D was low. They already picked up his Vit D 74740 prescription at [facility]. fs = 150/POCT Blood Glucose

## 2025-03-21 ENCOUNTER — APPOINTMENT (OUTPATIENT)
Dept: OBGYN | Facility: CLINIC | Age: 60
End: 2025-03-21

## 2025-03-26 ENCOUNTER — APPOINTMENT (OUTPATIENT)
Age: 60
End: 2025-03-26
Payer: COMMERCIAL

## 2025-03-26 DIAGNOSIS — M20.41 OTHER HAMMER TOE(S) (ACQUIRED), RIGHT FOOT: ICD-10-CM

## 2025-03-26 DIAGNOSIS — E11.42 TYPE 2 DIABETES MELLITUS WITH DIABETIC POLYNEUROPATHY: ICD-10-CM

## 2025-03-26 DIAGNOSIS — M79.671 PAIN IN RIGHT FOOT: ICD-10-CM

## 2025-03-26 DIAGNOSIS — B35.1 TINEA UNGUIUM: ICD-10-CM

## 2025-03-26 DIAGNOSIS — M20.42 OTHER HAMMER TOE(S) (ACQUIRED), LEFT FOOT: ICD-10-CM

## 2025-03-26 DIAGNOSIS — M79.672 PAIN IN LEFT FOOT: ICD-10-CM

## 2025-03-26 DIAGNOSIS — L84 CORNS AND CALLOSITIES: ICD-10-CM

## 2025-03-26 PROCEDURE — 11056 PARNG/CUTG B9 HYPRKR LES 2-4: CPT

## 2025-03-26 PROCEDURE — 99213 OFFICE O/P EST LOW 20 MIN: CPT | Mod: 25

## 2025-03-26 PROCEDURE — 11720 DEBRIDE NAIL 1-5: CPT | Mod: 59

## 2025-04-03 ENCOUNTER — APPOINTMENT (OUTPATIENT)
Dept: PULMONOLOGY | Facility: HOSPITAL | Age: 60
End: 2025-04-03

## 2025-04-04 ENCOUNTER — APPOINTMENT (OUTPATIENT)
Dept: PULMONOLOGY | Facility: HOSPITAL | Age: 60
End: 2025-04-04
Payer: COMMERCIAL

## 2025-04-04 ENCOUNTER — OUTPATIENT (OUTPATIENT)
Dept: OUTPATIENT SERVICES | Facility: HOSPITAL | Age: 60
LOS: 1 days | End: 2025-04-04
Payer: COMMERCIAL

## 2025-04-04 DIAGNOSIS — R06.02 SHORTNESS OF BREATH: ICD-10-CM

## 2025-04-04 DIAGNOSIS — Z98.890 OTHER SPECIFIED POSTPROCEDURAL STATES: Chronic | ICD-10-CM

## 2025-04-04 DIAGNOSIS — Z90.49 ACQUIRED ABSENCE OF OTHER SPECIFIED PARTS OF DIGESTIVE TRACT: Chronic | ICD-10-CM

## 2025-04-04 DIAGNOSIS — Z90.710 ACQUIRED ABSENCE OF BOTH CERVIX AND UTERUS: Chronic | ICD-10-CM

## 2025-04-04 PROCEDURE — 94010 BREATHING CAPACITY TEST: CPT | Mod: 26

## 2025-04-04 PROCEDURE — 94726 PLETHYSMOGRAPHY LUNG VOLUMES: CPT

## 2025-04-04 PROCEDURE — 94727 GAS DIL/WSHOT DETER LNG VOL: CPT | Mod: 26

## 2025-04-04 PROCEDURE — 94010 BREATHING CAPACITY TEST: CPT

## 2025-04-04 PROCEDURE — 94729 DIFFUSING CAPACITY: CPT

## 2025-04-04 PROCEDURE — 94729 DIFFUSING CAPACITY: CPT | Mod: 26

## 2025-04-05 DIAGNOSIS — R06.02 SHORTNESS OF BREATH: ICD-10-CM

## 2025-04-16 ENCOUNTER — APPOINTMENT (OUTPATIENT)
Age: 60
End: 2025-04-16

## 2025-04-21 NOTE — DISCHARGE NOTE NURSING/CASE MANAGEMENT/SOCIAL WORK - NSTRANSFERBELONGINGSRESP_GEN_A_NUR
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 We discussed that patient should not do all the strengthening exercises at one time and not ever day. I am giving patient different options of different ways to work the muscles of his upper body. Patient verbalized understanding.     Plan:   Continue with skilled OT POC     Subjective   Patient agreeable to treatment session. No new issues to report.     Precautions:   STEADI: 2    Pain Assessment:  Pain Assessment  Pain Assessment: 0-10  0-10 (Numeric) Pain Score: 0 - No pain    Objective    Therapeutic Exercise: 40 mins  -Towel slides sitting at table 15x each muscle movement each UE: shoulder flexion, shoulder abduction  -Prone shoulder exercises on mat table 10x each: shoulder extension, scapular retraction  -In supine, facilitation of shoulder press with cane AAROM to lift arms slightly off of mat table to hold for 3 seconds, 5x.   -In sitting completed 1 set of 10 of the following BUE strengthening exercises using a yellow theraband: elbow flexion, elbow extension, rows    OP EDUCATION:   Continue with HEP  Given handouts for prone strengthening and yellow TB strengthening     Goals:  Active       OT Goals       HEP       Start:  25    Expected End:  25       Will  re-establish a home program to include UE stretching/ROM and strengthening, as well as educate patient on AE/DME and modifications to maintain function for ADL/IADL tasks.        
yes

## 2025-04-23 ENCOUNTER — APPOINTMENT (OUTPATIENT)
Dept: ORTHOPEDIC SURGERY | Facility: CLINIC | Age: 60
End: 2025-04-23

## 2025-04-29 ENCOUNTER — OUTPATIENT (OUTPATIENT)
Dept: OUTPATIENT SERVICES | Facility: HOSPITAL | Age: 60
LOS: 1 days | End: 2025-04-29
Payer: COMMERCIAL

## 2025-04-29 DIAGNOSIS — Z90.49 ACQUIRED ABSENCE OF OTHER SPECIFIED PARTS OF DIGESTIVE TRACT: Chronic | ICD-10-CM

## 2025-04-29 DIAGNOSIS — Z98.890 OTHER SPECIFIED POSTPROCEDURAL STATES: Chronic | ICD-10-CM

## 2025-04-29 DIAGNOSIS — R07.9 CHEST PAIN, UNSPECIFIED: ICD-10-CM

## 2025-04-29 DIAGNOSIS — Z00.8 ENCOUNTER FOR OTHER GENERAL EXAMINATION: ICD-10-CM

## 2025-04-29 DIAGNOSIS — Z90.710 ACQUIRED ABSENCE OF BOTH CERVIX AND UTERUS: Chronic | ICD-10-CM

## 2025-04-29 PROCEDURE — A9500: CPT

## 2025-04-29 PROCEDURE — 93018 CV STRESS TEST I&R ONLY: CPT

## 2025-04-29 PROCEDURE — 78452 HT MUSCLE IMAGE SPECT MULT: CPT | Mod: 26

## 2025-04-29 PROCEDURE — 78452 HT MUSCLE IMAGE SPECT MULT: CPT

## 2025-04-30 ENCOUNTER — APPOINTMENT (OUTPATIENT)
Dept: PULMONOLOGY | Facility: CLINIC | Age: 60
End: 2025-04-30
Payer: COMMERCIAL

## 2025-04-30 VITALS
DIASTOLIC BLOOD PRESSURE: 80 MMHG | BODY MASS INDEX: 36.88 KG/M2 | HEART RATE: 71 BPM | WEIGHT: 216 LBS | RESPIRATION RATE: 14 BRPM | OXYGEN SATURATION: 98 % | HEIGHT: 64 IN | SYSTOLIC BLOOD PRESSURE: 124 MMHG

## 2025-04-30 DIAGNOSIS — R06.02 SHORTNESS OF BREATH: ICD-10-CM

## 2025-04-30 DIAGNOSIS — G47.33 OBSTRUCTIVE SLEEP APNEA (ADULT) (PEDIATRIC): ICD-10-CM

## 2025-04-30 DIAGNOSIS — R07.9 CHEST PAIN, UNSPECIFIED: ICD-10-CM

## 2025-04-30 DIAGNOSIS — R05.3 CHRONIC COUGH: ICD-10-CM

## 2025-04-30 PROCEDURE — G2211 COMPLEX E/M VISIT ADD ON: CPT | Mod: NC

## 2025-04-30 PROCEDURE — 99213 OFFICE O/P EST LOW 20 MIN: CPT

## 2025-05-06 VITALS
SYSTOLIC BLOOD PRESSURE: 184 MMHG | DIASTOLIC BLOOD PRESSURE: 85 MMHG | RESPIRATION RATE: 19 BRPM | OXYGEN SATURATION: 98 % | HEART RATE: 94 BPM

## 2025-05-06 NOTE — H&P CARDIOLOGY - HISTORY OF PRESENT ILLNESS
Patient is a 59y Female PMH of HTN, DM, CAD h/o DARIEN x 2 6/10/24 to OM1/dLCx (residual disease to D1).   Pt reports     C 6/10/24    FINDINGS:   DOMINANCE: Left Dominant    LM: Mild disease  LAD: Mild disease diffusely  D1: 100% stenosis of proximal segment  CX: Proximal segment with mild disease, distal segment with 99% stenosis  OM1: 90% stenosis  LPL: Mild disease  RCA: Small, mild disease diffusely    LVEDP: 8  mmHg        POST-OP DIAGNOSIS:  Severe Disease of D1  Severe disease of distal LCX s/p Successful PCI with DARIEN  Severe disease of OM1 s/p successful PCI with DARIEN      Pre cath note:  indication:  [ ] STEMI                [ ] NSTEMI                 [ ] Acute coronary syndrome                   [ ]Unstable Angina   [ ] high risk  [ ] intermediate risk  [ ] low risk                   [ ] Stable Angina     non-invasive testing:                          Date:                     result: [ ] high risk  [ ] intermediate risk  [ ] low risk    Anti- Anginal medications:                    [ ] not used d/t                     [ ] used   ( ) BB     ( ) CCB      ( ) Nitrate   (  ) Ranexa          [ ] not used but strong indication not to use    Ejection Fraction                   [ ] <29            [ ] 30-39%   [ ] 40-49%     [ ]>50%    CHF          [ ] active (within last 14 days on meds   [ ] Chronic (on meds but no exacerbation)  NYHA Functional Class:  (  ) Class I (no limitations)  (  ) Class II (slight limitation)  (  ) Class III (marked limitation)  (  ) Class IV (symptoms at rest)    COPD                   [ ] mild (on chronic bronchodilators)  [ ] moderate (on chronic steroid therapy)      [ ] severe (indication for home O2 or PACO2 >50)    Other risk factors:                     [ ] Previous MI                     [ ] CVA/ stroke                    [ ] carotid stent/ CEA                    [ ] PVD/PAD- (arterial aneurysm, non-palpable pulses, tortuous vessel with inability to insert catheter, infra-renal dissection, renal or subclavian artery stenosis)                    [ ] previous CABG                    [ ] Renal Failure:  on HD  (  ) yes  (  ) no                    [ x] Diabetic  (  ) Type 1  (  x) Type 2                                         ( x ) Insulin dependent  (  ) non-insulin dependent                                         (x  ) Metformin  (  ) Januvia  (  ) Glimepiride  (  ) Glipizide  (  ) Glyburide  (  ) Actos                                         (  ) GLP-1 receptor agonists (Ozempic, Mounjaro, Wegovy, trulicity, Byetta, Victoza)                                         (  ) SGLT2 Inhibitors (Farxiga, Jardiance, Invokana)                                         (  ) Other                Bleeding Risk:     Pre-cath Hydration: (  )  cc IV bolus x 1 over 1 hr followed by:  (  ) NS @ 75cc/hr until procedure (up to 2 hrs) if EF> 50%                                                                                                                         (  ) NS @ 50cc/hr until procedure (up to 2 hrs) if EF< 50%                                      (  ) No precath hydration d/t    RIGHT RADIAL ARTERY EVALUATION:  LIBIA TEST: [] Negative          [] Positive    EF: 55-60%  Date: 10/2024    EKG:   Date: Patient is a 59y Female PMH of HTN, DM, CAD h/o DARIEN x 2 6/10/24 to OM1/dLCx (residual disease to D1).   Pt reports   Patient under went a NST 5/1/25 which showed medium-sized perfusion defect involving the mid to apical anterior, apical lateral and apical walls of the left ventricle. On the resting images, the perfusion defect is partially reversible. These findings are suggestive of infarct with pauline-infarct ischemia in the mid to apical anterior and apical lateral walls. She presents today for [facility] with possible intervention.     NST 5/1/25:   Impression:  1.  There is a medium-sized perfusion defect involving the mid to apical anterior, apical lateral and apical walls of the left ventricle. On the resting images, the perfusion defect is partially reversible. These findings are suggestive of infarct with pauline-infarct ischemia in the mid to apical anterior and apical lateral walls.  2.  LVEF is greater than 55%, with hypokinesis of the apical lateral and apical walls.      [facility] 6/10/24  FINDINGS:   DOMINANCE: Left Dominant    LM: Mild disease  LAD: Mild disease diffusely  D1: 100% stenosis of proximal segment  CX: Proximal segment with mild disease, distal segment with 99% stenosis  OM1: 90% stenosis  LPL: Mild disease  RCA: Small, mild disease diffusely    LVEDP: 8  mmHg        POST-OP DIAGNOSIS:  Severe Disease of D1  Severe disease of distal LCX s/p Successful PCI with DARIEN  Severe disease of OM1 s/p successful PCI with DARIEN      Pre cath note:  indication:  [ ] STEMI                [ ] NSTEMI                 [ ] Acute coronary syndrome                   [ ]Unstable Angina   [ ] high risk  [ ] intermediate risk  [ ] low risk                   [ ] Stable Angina     non-invasive testing:                          Date:                     result: [ ] high risk  [ ] intermediate risk  [ ] low risk    Anti- Anginal medications:                    [ ] not used d/t                     [ ] used   ( ) BB     ( ) CCB      ( ) Nitrate   (  ) Ranexa          [ ] not used but strong indication not to use    Ejection Fraction                   [ ] <29            [ ] 30-39%   [ ] 40-49%     [ ]>50%    CHF          [ ] active (within last 14 days on meds   [ ] Chronic (on meds but no exacerbation)  NYHA Functional Class:  (  ) Class I (no limitations)  (  ) Class II (slight limitation)  (  ) Class III (marked limitation)  (  ) Class IV (symptoms at rest)    COPD                   [ ] mild (on chronic bronchodilators)  [ ] moderate (on chronic steroid therapy)      [ ] severe (indication for home O2 or PACO2 >50)    Other risk factors:                     [ ] Previous MI                     [ ] CVA/ stroke                    [ ] carotid stent/ CEA                    [ ] PVD/PAD- (arterial aneurysm, non-palpable pulses, tortuous vessel with inability to insert catheter, infra-renal dissection, renal or subclavian artery stenosis)                    [ ] previous CABG                    [ ] Renal Failure:  on HD  (  ) yes  (  ) no                    [ x] Diabetic  (  ) Type 1  (  x) Type 2                                         ( x ) Insulin dependent  (  ) non-insulin dependent                                         (x  ) Metformin  (  ) Januvia  (  ) Glimepiride  (  ) Glipizide  (  ) Glyburide  (  ) Actos                                         (  ) GLP-1 receptor agonists (Ozempic, Mounjaro, Wegovy, trulicity, Byetta, Victoza)                                         (  ) SGLT2 Inhibitors (Farxiga, Jardiance, Invokana)                                         (  ) Other                Bleeding Risk:     Pre-cath Hydration: (  )  cc IV bolus x 1 over 1 hr followed by:  (  ) NS @ 75cc/hr until procedure (up to 2 hrs) if EF> 50%                                                                                                                         (  ) NS @ 50cc/hr until procedure (up to 2 hrs) if EF< 50%                                      (  ) No precath hydration d/t    RIGHT RADIAL ARTERY EVALUATION:  LIBIA TEST: [] Negative          [] Positive    EF: 55-60%  Date: 10/2024    EKG:   Date: Patient is a 59y Female PMH of HTN, DM, CAD h/o DARIEN x 2 6/10/24 to OM1/dLCx (residual disease to D1).   Pt reports SOB that has been consistenly getting worse since last year when she had her two stents.  She states that now she can barely walk a block before getting winded   Patient under went a NST 5/1/25 which showed medium-sized perfusion defect involving the mid to apical anterior, apical lateral and apical walls of the left ventricle. On the resting images, the perfusion defect is partially reversible. These findings are suggestive of infarct with pauline-infarct ischemia in the mid to apical anterior and apical lateral walls. She presents today for [place] with possible intervention.     NST 5/1/25:   Impression:  1.  There is a medium-sized perfusion defect involving the mid to apical anterior, apical lateral and apical walls of the left ventricle. On the resting images, the perfusion defect is partially reversible. These findings are suggestive of infarct with pauline-infarct ischemia in the mid to apical anterior and apical lateral walls.  2.  LVEF is greater than 55%, with hypokinesis of the apical lateral and apical walls.      [place] 6/10/24  Intervention: Successful PCI of OM1 and distal LCx  Implants: Elmer Cassia 2.5 x 15 to distal LCx, Elmer Frotnier 2.75 x 18 to OM1    FINDINGS:   DOMINANCE: Left Dominant    LM: Mild disease  LAD: Mild disease diffusely  D1: 100% stenosis of proximal segment  CX: Proximal segment with mild disease, distal segment with 99% stenosis  OM1: 90% stenosis  LPL: Mild disease  RCA: Small, mild disease diffusely    LVEDP: 8  mmHg        POST-OP DIAGNOSIS:  Severe Disease of D1  Severe disease of distal LCX s/p Successful PCI with DARIEN  Severe disease of OM1 s/p successful PCI with DARIEN      Pre cath note:  indication:  [ ] STEMI                [ ] NSTEMI                 [ ] Acute coronary syndrome                   [ ]Unstable Angina   [ ] high risk  [ ] intermediate risk  [ ] low risk                   [x ] Stable Angina     non-invasive testing: stress test                          Date:  5/2025                   result: [ ] high risk  [ x] intermediate risk  [ ] low risk    Anti- Anginal medications:                    [ ] not used d/t                     [x ] used   (x ) BB     ( ) CCB      ( ) Nitrate   (  ) Ranexa          [ ] not used but strong indication not to use    Ejection Fraction                   [ ] <29            [ ] 30-39%   [ ] 40-49%     [ ]>50%    CHF          [ ] active (within last 14 days on meds   [ ] Chronic (on meds but no exacerbation)  NYHA Functional Class:  (  ) Class I (no limitations)  (  ) Class II (slight limitation)  (  ) Class III (marked limitation)  (  ) Class IV (symptoms at rest)    COPD                   [ ] mild (on chronic bronchodilators)  [ ] moderate (on chronic steroid therapy)      [ ] severe (indication for home O2 or PACO2 >50)    Other risk factors:                     [ ] Previous MI                     [ ] CVA/ stroke                    [ ] carotid stent/ CEA                    [ ] PVD/PAD- (arterial aneurysm, non-palpable pulses, tortuous vessel with inability to insert catheter, infra-renal dissection, renal or subclavian artery stenosis)                    [ ] previous CABG                    [ ] Renal Failure:  on HD  (  ) yes  (  ) no                    [ x] Diabetic  (  ) Type 1  (  x) Type 2                                         ( x ) Insulin dependent  (  ) non-insulin dependent                                         (x  ) Metformin  (  ) Januvia  (  ) Glimepiride  (  ) Glipizide  (  ) Glyburide  (  ) Actos                                         (  ) GLP-1 receptor agonists (Ozempic, Mounjaro, Wegovy, trulicity, Byetta, Victoza)                                         (  ) SGLT2 Inhibitors (Farxiga, Jardiance, Invokana)                                         (  ) Other                Bleeding Risk: 1.4 %    Pre-cath Hydration: (x  )  cc IV bolus x 1 over 1 hr followed by:  ( x ) NS @ 75cc/hr until procedure (up to 2 hrs) if EF> 50%                                                                                                                         (  ) NS @ 50cc/hr until procedure (up to 2 hrs) if EF< 50%                                      (  ) No precath hydration d/t    RIGHT RADIAL ARTERY EVALUATION:  LIBIA TEST: [] Negative          [x] Positive    EF: 55-60%  Date: 10/2024    EKG:   Date: 5/1/25    Patient has allergy to ASA  has allergy to Benadryl and Iodine Contrast- on Prednisone taper for procedure x 4 doses (last dose taken this morning)  Give Solucortef 200mg IVP x 1 and Pepcid 20mg IV for pre cath

## 2025-05-09 ENCOUNTER — TRANSCRIPTION ENCOUNTER (OUTPATIENT)
Age: 60
End: 2025-05-09

## 2025-05-09 ENCOUNTER — OUTPATIENT (OUTPATIENT)
Dept: OUTPATIENT SERVICES | Facility: HOSPITAL | Age: 60
LOS: 1 days | Discharge: HOME | End: 2025-05-09
Payer: COMMERCIAL

## 2025-05-09 VITALS
DIASTOLIC BLOOD PRESSURE: 75 MMHG | SYSTOLIC BLOOD PRESSURE: 151 MMHG | RESPIRATION RATE: 16 BRPM | OXYGEN SATURATION: 97 % | HEART RATE: 80 BPM

## 2025-05-09 DIAGNOSIS — E78.5 HYPERLIPIDEMIA, UNSPECIFIED: ICD-10-CM

## 2025-05-09 DIAGNOSIS — Z98.890 OTHER SPECIFIED POSTPROCEDURAL STATES: Chronic | ICD-10-CM

## 2025-05-09 DIAGNOSIS — I25.10 ATHEROSCLEROTIC HEART DISEASE OF NATIVE CORONARY ARTERY WITHOUT ANGINA PECTORIS: ICD-10-CM

## 2025-05-09 DIAGNOSIS — I10 ESSENTIAL (PRIMARY) HYPERTENSION: ICD-10-CM

## 2025-05-09 DIAGNOSIS — Z90.710 ACQUIRED ABSENCE OF BOTH CERVIX AND UTERUS: Chronic | ICD-10-CM

## 2025-05-09 DIAGNOSIS — I25.118 ATHEROSCLEROTIC HEART DISEASE OF NATIVE CORONARY ARTERY WITH OTHER FORMS OF ANGINA PECTORIS: ICD-10-CM

## 2025-05-09 DIAGNOSIS — Z90.49 ACQUIRED ABSENCE OF OTHER SPECIFIED PARTS OF DIGESTIVE TRACT: Chronic | ICD-10-CM

## 2025-05-09 LAB
ANION GAP SERPL CALC-SCNC: 15 MMOL/L — HIGH (ref 7–14)
BUN SERPL-MCNC: 20 MG/DL — SIGNIFICANT CHANGE UP (ref 10–20)
CALCIUM SERPL-MCNC: 9.8 MG/DL — SIGNIFICANT CHANGE UP (ref 8.4–10.5)
CHLORIDE SERPL-SCNC: 101 MMOL/L — SIGNIFICANT CHANGE UP (ref 98–110)
CO2 SERPL-SCNC: 21 MMOL/L — SIGNIFICANT CHANGE UP (ref 17–32)
CREAT SERPL-MCNC: 0.8 MG/DL — SIGNIFICANT CHANGE UP (ref 0.7–1.5)
EGFR: 84 ML/MIN/1.73M2 — SIGNIFICANT CHANGE UP
EGFR: 84 ML/MIN/1.73M2 — SIGNIFICANT CHANGE UP
GLUCOSE BLDC GLUCOMTR-MCNC: 299 MG/DL — HIGH (ref 70–99)
GLUCOSE SERPL-MCNC: 316 MG/DL — HIGH (ref 70–99)
HCT VFR BLD CALC: 42.9 % — SIGNIFICANT CHANGE UP (ref 37–47)
HGB BLD-MCNC: 14.6 G/DL — SIGNIFICANT CHANGE UP (ref 12–16)
MCHC RBC-ENTMCNC: 27.9 PG — SIGNIFICANT CHANGE UP (ref 27–31)
MCHC RBC-ENTMCNC: 34 G/DL — SIGNIFICANT CHANGE UP (ref 32–37)
MCV RBC AUTO: 82 FL — SIGNIFICANT CHANGE UP (ref 81–99)
NRBC BLD AUTO-RTO: 0 /100 WBCS — SIGNIFICANT CHANGE UP (ref 0–0)
PLATELET # BLD AUTO: 222 K/UL — SIGNIFICANT CHANGE UP (ref 130–400)
PMV BLD: 9.7 FL — SIGNIFICANT CHANGE UP (ref 7.4–10.4)
POTASSIUM SERPL-MCNC: 4.3 MMOL/L — SIGNIFICANT CHANGE UP (ref 3.5–5)
POTASSIUM SERPL-SCNC: 4.3 MMOL/L — SIGNIFICANT CHANGE UP (ref 3.5–5)
RBC # BLD: 5.23 M/UL — SIGNIFICANT CHANGE UP (ref 4.2–5.4)
RBC # FLD: 13.6 % — SIGNIFICANT CHANGE UP (ref 11.5–14.5)
SODIUM SERPL-SCNC: 137 MMOL/L — SIGNIFICANT CHANGE UP (ref 135–146)
WBC # BLD: 18.44 K/UL — HIGH (ref 4.8–10.8)
WBC # FLD AUTO: 18.44 K/UL — HIGH (ref 4.8–10.8)

## 2025-05-09 PROCEDURE — 82962 GLUCOSE BLOOD TEST: CPT

## 2025-05-09 PROCEDURE — 80048 BASIC METABOLIC PNL TOTAL CA: CPT

## 2025-05-09 PROCEDURE — 36415 COLL VENOUS BLD VENIPUNCTURE: CPT

## 2025-05-09 PROCEDURE — C1887: CPT

## 2025-05-09 PROCEDURE — 93458 L HRT ARTERY/VENTRICLE ANGIO: CPT | Mod: 26

## 2025-05-09 PROCEDURE — C1769: CPT

## 2025-05-09 PROCEDURE — C1894: CPT

## 2025-05-09 PROCEDURE — 93458 L HRT ARTERY/VENTRICLE ANGIO: CPT

## 2025-05-09 PROCEDURE — 85027 COMPLETE CBC AUTOMATED: CPT

## 2025-05-09 RX ORDER — ALPRAZOLAM 0.5 MG
1 TABLET, EXTENDED RELEASE 24 HR ORAL
Refills: 0 | DISCHARGE

## 2025-05-09 RX ORDER — HYDROCORTISONE 20 MG
200 TABLET ORAL ONCE
Refills: 0 | Status: COMPLETED | OUTPATIENT
Start: 2025-05-09 | End: 2025-05-09

## 2025-05-09 RX ADMIN — Medication 75 MILLILITER(S): at 07:36

## 2025-05-09 RX ADMIN — Medication 20 MILLIGRAM(S): at 07:37

## 2025-05-09 RX ADMIN — Medication 100 MILLILITER(S): at 09:05

## 2025-05-09 RX ADMIN — Medication 500 MILLILITER(S): at 07:36

## 2025-05-09 RX ADMIN — Medication 200 MILLIGRAM(S): at 07:36

## 2025-05-09 NOTE — ASU DISCHARGE PLAN (ADULT/PEDIATRIC) - CARE PROVIDER_API CALL
Chico Casas  Interventional Cardiology  [address] [place] [id number]  Phone: [phone]  Fax: [phone]  Follow Up Time: 2 weeks

## 2025-05-09 NOTE — ASU DISCHARGE PLAN (ADULT/PEDIATRIC) - ASU DC SPECIAL INSTRUCTIONSFT
Activity:  - Do not drive or operate heavy machinery for 24 hours.  - Limit your physical or any strenuous activity for 2 weeks after angioplasty and 48 hours for angiogram. Support the groin site with your hand when you sneeze or cough. No heavy lifting ( objects more then 10 pounds).  - For wrist access, avoid using affected arm for 24 hours after removal of dressing and avoid heavy lifting for 7 days.  Hygiene:  - After 24 hours, you may shower and remove the dressing from the site. Do not tub bathe for one week. Do not rub or apply lotion, cream, powder to the affected site. Leave it open to air.   Diet:   - You may resume your diet. Low Sodium. Low Fat, Low Cholesterol.  If Diabetic - Carbohydrate Consistent Diet.      - Drink extra fluid unless otherwise advised.   Special Instructions:  - Bruising or black and blue at the puncture site is possible.  - If there is bleeding from the puncture site (groin or wrist) apply direct firm pressure on the site and call 911.  - Any sudden swelling, redness, fever, discharge or severe pain, call your physician or call the cath lab.   - If you notice any scab formation in the area avoid touching the site and allow it to heal.  - Numbness or "pins and needle" sensation in the affected arm, hand, leg or if the affected site become cool to touch or pale that persist for extended period of time call your physician immediately to be checked.  - If you developed chest pain, not relieved by your usual routine medication, fainting, lethargy, weakness, report to the nearest emergency room.   - Inform your Dentist or Surgeon if you are taking Aspirin or any antiplatelet medications. Report any bleeding in your urine or stool.   Medications:  - Soreness or tenderness at the site is possible it will diminish over time. You may take Tylenol every 4-6 hours as needed. Nothing stronger is needed.  - If you are diabetic and taking medication containing Metformin, do not take them for 48 hours after the procedure.     Any questions call Cardiac Cath Lab at [phone] or [phone], Monday - Friday from 7 - 9 pm.

## 2025-05-09 NOTE — ASU DISCHARGE PLAN (ADULT/PEDIATRIC) - FINANCIAL ASSISTANCE
[facility] provides services at a reduced cost to those who are determined to be eligible through [facility]’s financial assistance program. Information regarding [facility]’s financial assistance program can be found by going to [email or web address] or by calling [phone].

## 2025-05-09 NOTE — ASU DISCHARGE PLAN (ADULT/PEDIATRIC) - NS MD DC FALL RISK RISK
For information on Fall & Injury Prevention, visit: [email or web address] OR  [email or web address] OR  https://www.cdc.gov/steadi/patient.html

## 2025-05-09 NOTE — CHART NOTE - NSCHARTNOTEFT_GEN_A_CORE
PROCEDURE:   - Left heart cath     PHYSICIAN: Dr. Casas   FELLOW: Dr. Rico    Pre-procedure Diagnosis: Stable angina     Consent: Patient  Anesthesia: Sedation | Local     ACCESS & CLOSURE:  6 Fr R Radial Artery; D-stat     IV Contrast: 50 mL      Intervention:     Implants:     AUC:      FINDINGS:     Coronary Dominance: Left     LM: Mild disease    LAD: Mild disease   D1:  of proximal segment     CX: Mild disease in prox CX, patent stent in distal segment    OM1: patent stent     RCA: small mild disease   RPDA: small minor irregularities     LVEDP: 10 mmHg     AV gradient: No significant gradient     ESTIMATED BLOOD LOSS: < 10 mL      CONDITION: Good   SPECIMEN REMOVED: N/A     POST-OP DIAGNOSIS:    - Patent stents in LCX and OM1,  of D1     PLAN OF CARE:   [X] D/C Home Today   [X] Medications:   - Continue Clopidogrel 75 mg PO QD   - statin  [X] LVEDP guided IV Fluids: NS @ 100cc/h x 2 hours  [X] Remove D-stat. Hold manual pressure if signs of hematoma or bleeding over radial access site.

## 2025-05-12 ENCOUNTER — NON-APPOINTMENT (OUTPATIENT)
Age: 60
End: 2025-05-12

## 2025-05-13 ENCOUNTER — APPOINTMENT (OUTPATIENT)
Dept: OBGYN | Facility: CLINIC | Age: 60
End: 2025-05-13
Payer: COMMERCIAL

## 2025-05-13 ENCOUNTER — NON-APPOINTMENT (OUTPATIENT)
Age: 60
End: 2025-05-13

## 2025-05-13 VITALS
WEIGHT: 214 LBS | SYSTOLIC BLOOD PRESSURE: 130 MMHG | HEART RATE: 79 BPM | DIASTOLIC BLOOD PRESSURE: 78 MMHG | BODY MASS INDEX: 36.54 KG/M2 | HEIGHT: 64 IN

## 2025-05-13 DIAGNOSIS — Z01.419 ENCOUNTER FOR GYNECOLOGICAL EXAMINATION (GENERAL) (ROUTINE) W/OUT ABNORMAL FINDINGS: ICD-10-CM

## 2025-05-13 DIAGNOSIS — L29.2 PRURITUS VULVAE: ICD-10-CM

## 2025-05-13 DIAGNOSIS — N89.8 OTHER SPECIFIED NONINFLAMMATORY DISORDERS OF VAGINA: ICD-10-CM

## 2025-05-13 PROCEDURE — 99396 PREV VISIT EST AGE 40-64: CPT | Mod: 25

## 2025-05-13 PROCEDURE — 99213 OFFICE O/P EST LOW 20 MIN: CPT | Mod: 25

## 2025-05-13 RX ORDER — FLUCONAZOLE 150 MG/1
150 TABLET ORAL DAILY
Qty: 2 | Refills: 0 | Status: ACTIVE | COMMUNITY
Start: 2025-05-13 | End: 1900-01-01

## 2025-05-13 RX ORDER — METRONIDAZOLE 7.5 MG/G
0.75 GEL VAGINAL
Qty: 1 | Refills: 0 | Status: ACTIVE | COMMUNITY
Start: 2025-05-13 | End: 1900-01-01

## 2025-05-14 ENCOUNTER — APPOINTMENT (OUTPATIENT)
Age: 60
End: 2025-05-14
Payer: COMMERCIAL

## 2025-05-14 DIAGNOSIS — M20.42 OTHER HAMMER TOE(S) (ACQUIRED), LEFT FOOT: ICD-10-CM

## 2025-05-14 DIAGNOSIS — M20.41 OTHER HAMMER TOE(S) (ACQUIRED), RIGHT FOOT: ICD-10-CM

## 2025-05-14 DIAGNOSIS — L84 CORNS AND CALLOSITIES: ICD-10-CM

## 2025-05-14 DIAGNOSIS — E11.42 TYPE 2 DIABETES MELLITUS WITH DIABETIC POLYNEUROPATHY: ICD-10-CM

## 2025-05-14 DIAGNOSIS — B35.1 TINEA UNGUIUM: ICD-10-CM

## 2025-05-14 DIAGNOSIS — M79.671 PAIN IN RIGHT FOOT: ICD-10-CM

## 2025-05-14 DIAGNOSIS — M79.672 PAIN IN LEFT FOOT: ICD-10-CM

## 2025-05-14 PROCEDURE — 11056 PARNG/CUTG B9 HYPRKR LES 2-4: CPT

## 2025-05-14 PROCEDURE — 11720 DEBRIDE NAIL 1-5: CPT | Mod: 59

## 2025-05-14 PROCEDURE — 99213 OFFICE O/P EST LOW 20 MIN: CPT | Mod: 25

## 2025-05-18 LAB
A VAGINAE DNA VAG QL NAA+PROBE: NORMAL
BVAB2 DNA VAG QL NAA+PROBE: NORMAL
C KRUSEI DNA VAG QL NAA+PROBE: NEGATIVE
CANDIDA DNA VAG QL NAA+PROBE: NEGATIVE
MEGA1 DNA VAG QL NAA+PROBE: NORMAL
T VAGINALIS RRNA SPEC QL NAA+PROBE: NEGATIVE

## 2025-06-03 ENCOUNTER — APPOINTMENT (OUTPATIENT)
Dept: ORTHOPEDIC SURGERY | Facility: CLINIC | Age: 60
End: 2025-06-03

## 2025-06-10 ENCOUNTER — APPOINTMENT (OUTPATIENT)
Dept: ORTHOPEDIC SURGERY | Facility: CLINIC | Age: 60
End: 2025-06-10

## 2025-06-11 ENCOUNTER — APPOINTMENT (OUTPATIENT)
Age: 60
End: 2025-06-11

## 2025-06-18 ENCOUNTER — APPOINTMENT (OUTPATIENT)
Age: 60
End: 2025-06-18
Payer: COMMERCIAL

## 2025-06-18 PROCEDURE — 11056 PARNG/CUTG B9 HYPRKR LES 2-4: CPT | Mod: Q9

## 2025-06-18 PROCEDURE — 99213 OFFICE O/P EST LOW 20 MIN: CPT | Mod: 25

## 2025-06-18 PROCEDURE — 11720 DEBRIDE NAIL 1-5: CPT | Mod: 59,Q9

## 2025-06-18 RX ORDER — L-METHYLFOLATE-ALGAE-VIT B12-B6 CAP 3-90.314-2-35 MG 3-90.314-2-35 MG
3-90.314-2-35 CAP ORAL
Qty: 90 | Refills: 3 | Status: ACTIVE | COMMUNITY
Start: 2025-06-18 | End: 1900-01-01

## 2025-07-16 ENCOUNTER — APPOINTMENT (OUTPATIENT)
Age: 60
End: 2025-07-16
Payer: COMMERCIAL

## 2025-07-16 PROCEDURE — 11056 PARNG/CUTG B9 HYPRKR LES 2-4: CPT | Mod: Q9

## 2025-07-16 PROCEDURE — 99213 OFFICE O/P EST LOW 20 MIN: CPT | Mod: 25

## 2025-08-13 ENCOUNTER — APPOINTMENT (OUTPATIENT)
Age: 60
End: 2025-08-13
Payer: COMMERCIAL

## 2025-08-13 DIAGNOSIS — E11.42 TYPE 2 DIABETES MELLITUS WITH DIABETIC POLYNEUROPATHY: ICD-10-CM

## 2025-08-13 DIAGNOSIS — M20.41 OTHER HAMMER TOE(S) (ACQUIRED), RIGHT FOOT: ICD-10-CM

## 2025-08-13 DIAGNOSIS — M20.42 OTHER HAMMER TOE(S) (ACQUIRED), LEFT FOOT: ICD-10-CM

## 2025-08-13 DIAGNOSIS — E11.9 TYPE 2 DIABETES MELLITUS W/OUT COMPLICATIONS: ICD-10-CM

## 2025-08-13 PROCEDURE — 99213 OFFICE O/P EST LOW 20 MIN: CPT

## 2025-08-24 ENCOUNTER — EMERGENCY (EMERGENCY)
Facility: HOSPITAL | Age: 60
LOS: 0 days | Discharge: HOME | End: 2025-08-24
Attending: EMERGENCY MEDICINE
Payer: COMMERCIAL

## 2025-08-24 VITALS
DIASTOLIC BLOOD PRESSURE: 77 MMHG | OXYGEN SATURATION: 98 % | TEMPERATURE: 98 F | SYSTOLIC BLOOD PRESSURE: 143 MMHG | RESPIRATION RATE: 18 BRPM | WEIGHT: 216.05 LBS | HEART RATE: 84 BPM

## 2025-08-24 DIAGNOSIS — Z90.710 ACQUIRED ABSENCE OF BOTH CERVIX AND UTERUS: Chronic | ICD-10-CM

## 2025-08-24 DIAGNOSIS — Z91.041 RADIOGRAPHIC DYE ALLERGY STATUS: ICD-10-CM

## 2025-08-24 DIAGNOSIS — Z88.8 ALLERGY STATUS TO OTHER DRUGS, MEDICAMENTS AND BIOLOGICAL SUBSTANCES: ICD-10-CM

## 2025-08-24 DIAGNOSIS — Z98.890 OTHER SPECIFIED POSTPROCEDURAL STATES: Chronic | ICD-10-CM

## 2025-08-24 DIAGNOSIS — Z91.040 LATEX ALLERGY STATUS: ICD-10-CM

## 2025-08-24 DIAGNOSIS — M79.662 PAIN IN LEFT LOWER LEG: ICD-10-CM

## 2025-08-24 DIAGNOSIS — Z79.02 LONG TERM (CURRENT) USE OF ANTITHROMBOTICS/ANTIPLATELETS: ICD-10-CM

## 2025-08-24 DIAGNOSIS — Z88.2 ALLERGY STATUS TO SULFONAMIDES: ICD-10-CM

## 2025-08-24 DIAGNOSIS — Z95.5 PRESENCE OF CORONARY ANGIOPLASTY IMPLANT AND GRAFT: ICD-10-CM

## 2025-08-24 DIAGNOSIS — R22.42 LOCALIZED SWELLING, MASS AND LUMP, LEFT LOWER LIMB: ICD-10-CM

## 2025-08-24 DIAGNOSIS — I25.10 ATHEROSCLEROTIC HEART DISEASE OF NATIVE CORONARY ARTERY WITHOUT ANGINA PECTORIS: ICD-10-CM

## 2025-08-24 DIAGNOSIS — Z88.6 ALLERGY STATUS TO ANALGESIC AGENT: ICD-10-CM

## 2025-08-24 DIAGNOSIS — Z90.49 ACQUIRED ABSENCE OF OTHER SPECIFIED PARTS OF DIGESTIVE TRACT: Chronic | ICD-10-CM

## 2025-08-24 PROCEDURE — 99284 EMERGENCY DEPT VISIT MOD MDM: CPT

## 2025-08-24 PROCEDURE — 93970 EXTREMITY STUDY: CPT

## 2025-08-24 PROCEDURE — 99284 EMERGENCY DEPT VISIT MOD MDM: CPT | Mod: 25

## 2025-08-24 PROCEDURE — 93970 EXTREMITY STUDY: CPT | Mod: 26

## 2025-09-03 ENCOUNTER — APPOINTMENT (OUTPATIENT)
Age: 60
End: 2025-09-03
Payer: COMMERCIAL

## 2025-09-03 DIAGNOSIS — M79.671 PAIN IN RIGHT FOOT: ICD-10-CM

## 2025-09-03 DIAGNOSIS — B35.1 TINEA UNGUIUM: ICD-10-CM

## 2025-09-03 DIAGNOSIS — L30.9 DERMATITIS, UNSPECIFIED: ICD-10-CM

## 2025-09-03 DIAGNOSIS — M20.42 OTHER HAMMER TOE(S) (ACQUIRED), LEFT FOOT: ICD-10-CM

## 2025-09-03 DIAGNOSIS — E11.42 TYPE 2 DIABETES MELLITUS WITH DIABETIC POLYNEUROPATHY: ICD-10-CM

## 2025-09-03 DIAGNOSIS — M79.672 PAIN IN LEFT FOOT: ICD-10-CM

## 2025-09-03 DIAGNOSIS — L60.2 ONYCHOGRYPHOSIS: ICD-10-CM

## 2025-09-03 DIAGNOSIS — E11.9 TYPE 2 DIABETES MELLITUS W/OUT COMPLICATIONS: ICD-10-CM

## 2025-09-03 DIAGNOSIS — M20.41 OTHER HAMMER TOE(S) (ACQUIRED), RIGHT FOOT: ICD-10-CM

## 2025-09-03 DIAGNOSIS — M72.2 PLANTAR FASCIAL FIBROMATOSIS: ICD-10-CM

## 2025-09-03 PROCEDURE — 11721 DEBRIDE NAIL 6 OR MORE: CPT | Mod: Q9

## 2025-09-03 PROCEDURE — 99213 OFFICE O/P EST LOW 20 MIN: CPT | Mod: 25

## 2025-09-03 RX ORDER — CLOTRIMAZOLE AND BETAMETHASONE DIPROPIONATE 10; .5 MG/G; MG/G
1-0.05 CREAM TOPICAL TWICE DAILY
Qty: 1 | Refills: 3 | Status: ACTIVE | COMMUNITY
Start: 2025-09-03 | End: 1900-01-01

## 2025-09-07 PROBLEM — M72.2 PLANTAR FASCIITIS: Status: ACTIVE | Noted: 2025-09-07

## 2025-09-07 PROBLEM — L60.2 ONYCHAUXIS: Status: ACTIVE | Noted: 2025-09-07
